# Patient Record
Sex: FEMALE | Race: WHITE | NOT HISPANIC OR LATINO | Employment: OTHER | URBAN - METROPOLITAN AREA
[De-identification: names, ages, dates, MRNs, and addresses within clinical notes are randomized per-mention and may not be internally consistent; named-entity substitution may affect disease eponyms.]

---

## 2017-01-24 DIAGNOSIS — F41.9 ANXIETY: ICD-10-CM

## 2017-01-24 RX ORDER — CLONAZEPAM 0.5 MG/1
TABLET ORAL
Qty: 60 TABLET | Refills: 2 | OUTPATIENT
Start: 2017-01-24 | End: 2017-04-27 | Stop reason: SDUPTHER

## 2017-02-27 ENCOUNTER — OFFICE VISIT (OUTPATIENT)
Dept: INTERNAL MEDICINE | Facility: CLINIC | Age: 72
End: 2017-02-27

## 2017-02-27 ENCOUNTER — HOSPITAL ENCOUNTER (OUTPATIENT)
Dept: GENERAL RADIOLOGY | Facility: HOSPITAL | Age: 72
Discharge: HOME | End: 2017-02-27
Attending: FAMILY MEDICINE | Admitting: FAMILY MEDICINE

## 2017-02-27 VITALS
DIASTOLIC BLOOD PRESSURE: 70 MMHG | HEIGHT: 72 IN | WEIGHT: 183.2 LBS | TEMPERATURE: 98.3 F | BODY MASS INDEX: 24.81 KG/M2 | HEART RATE: 75 BPM | OXYGEN SATURATION: 98 % | SYSTOLIC BLOOD PRESSURE: 110 MMHG

## 2017-02-27 DIAGNOSIS — F41.8 DEPRESSION WITH ANXIETY: Primary | ICD-10-CM

## 2017-02-27 DIAGNOSIS — R05.9 COUGH: ICD-10-CM

## 2017-02-27 DIAGNOSIS — E78.5 HYPERLIPIDEMIA, UNSPECIFIED HYPERLIPIDEMIA TYPE: ICD-10-CM

## 2017-02-27 DIAGNOSIS — M54.5 CHRONIC MIDLINE LOW BACK PAIN, WITH SCIATICA PRESENCE UNSPECIFIED: ICD-10-CM

## 2017-02-27 DIAGNOSIS — R06.02 SHORTNESS OF BREATH: ICD-10-CM

## 2017-02-27 DIAGNOSIS — N18.30 CKD (CHRONIC KIDNEY DISEASE) STAGE 3, GFR 30-59 ML/MIN (HCC): ICD-10-CM

## 2017-02-27 DIAGNOSIS — G89.29 CHRONIC MIDLINE LOW BACK PAIN, WITH SCIATICA PRESENCE UNSPECIFIED: ICD-10-CM

## 2017-02-27 PROBLEM — K21.9 GERD (GASTROESOPHAGEAL REFLUX DISEASE): Status: ACTIVE | Noted: 2017-02-27

## 2017-02-27 PROCEDURE — 71020 HC CHEST PA AND LATERAL: CPT

## 2017-02-27 PROCEDURE — 99214 OFFICE O/P EST MOD 30 MIN: CPT | Performed by: FAMILY MEDICINE

## 2017-02-27 RX ORDER — OMEPRAZOLE 20 MG/1
20 CAPSULE, DELAYED RELEASE ORAL DAILY
COMMUNITY
End: 2017-05-22

## 2017-02-27 RX ORDER — MORPHINE SULFATE 60 MG/1
100 TABLET, FILM COATED, EXTENDED RELEASE ORAL 2 TIMES DAILY
COMMUNITY
Start: 2017-02-17 | End: 2020-03-03

## 2017-02-27 RX ORDER — INFLUENZA A VIRUS A/MICHIGAN/45/2015 X-275 (H1N1) ANTIGEN (FORMALDEHYDE INACTIVATED), INFLUENZA A VIRUS A/SINGAPORE/INFIMH-16-0019/2016 IVR-186 (H3N2) ANTIGEN (FORMALDEHYDE INACTIVATED), AND INFLUENZA B VIRUS B/MARYLAND/15/2016 BX-69A (A B/COLORADO/6/2017-LIKE VIRUS) ANTIGEN (FORMALDEHYDE INACTIVATED) 60; 60; 60 UG/.5ML; UG/.5ML; UG/.5ML
INJECTION, SUSPENSION INTRAMUSCULAR
COMMUNITY
Start: 2016-11-21 | End: 2017-05-22

## 2017-02-27 RX ORDER — MORPHINE SULFATE 15 MG/1
15 TABLET ORAL 2 TIMES DAILY
COMMUNITY
Start: 2017-01-19 | End: 2020-03-03

## 2017-02-27 RX ORDER — BUPROPION HYDROCHLORIDE 150 MG/1
150 TABLET ORAL DAILY
Qty: 30 TABLET | Refills: 5 | Status: SHIPPED | OUTPATIENT
Start: 2017-02-27 | End: 2017-08-15 | Stop reason: SDUPTHER

## 2017-02-27 NOTE — PROGRESS NOTES
Subjective     Sandra Mane is a 72 y.o. female, who presents with a chief complaint of   Chief Complaint   Patient presents with   • Follow-up     3 month.   • Anxiety     Crying a lot more.   • Shortness of Breath   • Back Pain       HPI     1. Depression with anxiety.  Pt reports her symptoms have been worsening. Feels sad and cries.  Denies SI.    2. Chronic back pain.  Pt reports symptoms are stable. Sees Dr. Rea.    3. Shortness of breath.  X 1 month, with exertion.  No smoking history but her parents were heavy smokers.  Denies wheezing but has been coughing for several weeks; it started with an upper respiratory infection.    The following portions of the patient's history were reviewed and updated as appropriate: allergies, current medications, past family history, past medical history, past social history, past surgical history and problem list.    Allergies: Review of patient's allergies indicates no known allergies.    Review of Systems   Constitutional: Negative.    HENT: Negative.    Eyes: Negative.    Respiratory: Negative.    Cardiovascular: Negative.    Gastrointestinal: Negative.    Endocrine: Negative.    Genitourinary: Negative.    Musculoskeletal: Positive for back pain.   Skin: Negative.    Allergic/Immunologic: Negative.    Hematological: Negative.    Psychiatric/Behavioral: Positive for dysphoric mood.       Objective     Wt Readings from Last 3 Encounters:   02/27/17 183 lb 3.2 oz (83.1 kg)   11/21/16 189 lb 9.6 oz (86 kg)   08/18/16 190 lb (86.2 kg)     Temp Readings from Last 3 Encounters:   02/27/17 98.3 °F (36.8 °C)   02/11/16 98.4 °F (36.9 °C)     BP Readings from Last 3 Encounters:   02/27/17 110/70   11/21/16 90/62   08/18/16 138/80     Pulse Readings from Last 3 Encounters:   02/27/17 75   11/21/16 84   08/18/16 88     Body mass index is 22.3 kg/(m^2).  SpO2 Readings from Last 3 Encounters:   02/27/17 98%   11/21/16 98%   08/18/16 98%       Physical Exam   Constitutional: She is  oriented to person, place, and time. She appears well-developed and well-nourished.   HENT:   Head: Normocephalic and atraumatic.   Right Ear: External ear normal.   Left Ear: External ear normal.   Nose: Nose normal.   Mouth/Throat: Oropharynx is clear and moist.   Eyes: Conjunctivae and EOM are normal. Pupils are equal, round, and reactive to light. No scleral icterus.   Neck: Normal range of motion. Neck supple. No thyromegaly present.   Cardiovascular: Normal rate, regular rhythm, normal heart sounds and intact distal pulses.  Exam reveals no gallop and no friction rub.    No murmur heard.  Pulmonary/Chest: Effort normal and breath sounds normal. No respiratory distress. She has no wheezes. She has no rales.   Abdominal: Soft. Bowel sounds are normal. There is no hepatosplenomegaly.   Musculoskeletal: She exhibits no edema.   Back not examined.   Lymphadenopathy:     She has no cervical adenopathy.   Neurological: She is alert and oriented to person, place, and time. She has normal reflexes. She displays normal reflexes. No cranial nerve deficit. She exhibits normal muscle tone. Coordination normal.   Skin: Skin is warm and dry. No rash noted.   Psychiatric: She has a normal mood and affect. Her behavior is normal. Judgment and thought content normal.         Assessment/Plan   Sandra was seen today for follow-up, anxiety, shortness of breath and back pain.    Diagnoses and all orders for this visit:    Depression with anxiety  -     CBC & Differential; Future  -     TSH; Future  -     buPROPion XL (WELLBUTRIN XL) 150 MG 24 hr tablet; Take 1 tablet by mouth Daily.    Hyperlipidemia, unspecified hyperlipidemia type  -     Comprehensive Metabolic Panel; Future  -     Lipid Panel With / Chol / HDL Ratio; Future    Chronic midline low back pain, with sciatica presence unspecified    CKD (chronic kidney disease) stage 3, GFR 30-59 ml/min    Cough  -     XR Chest PA & Lateral; Future    Shortness of breath  -     XR  Chest PA & Lateral; Future  -     Pulmonary Function Test; Future      1. Depression with anxiety.  Not controlled. Add bupropion  mg daily.    2. Hyperlipidemia.  Lifestyle measures.  Labs next time.    3. Chronic back pain.  Per Dr. Rea.    4. CKDIII.  Avoid NSAIDs.  Monitor.    5. Cough and shortness of breath.  Check CXR and PFTs.      Outpatient Medications Prior to Visit   Medication Sig Dispense Refill   • baclofen (LIORESAL) 10 MG tablet Take 10 mg by mouth 2 (two) times a day. TAKE 1/2 - 1 PO BID     • citalopram (CeleXA) 40 MG tablet Take 1 tablet by mouth Daily. 30 tablet 6   • clonazePAM (KlonoPIN) 0.5 MG tablet TAKE TWO TABLETS BY MOUTH EVERY NIGHT AT BEDTIME AS NEEDED 60 tablet 2   • gabapentin (NEURONTIN) 400 MG capsule Take 400 mg by mouth every 4 (four) hours.     • MORPHINE SULFATE ER PO Take 60 mg by mouth 2 (two) times a day.       No facility-administered medications prior to visit.      New Medications Ordered This Visit   Medications   • buPROPion XL (WELLBUTRIN XL) 150 MG 24 hr tablet     Sig: Take 1 tablet by mouth Daily.     Dispense:  30 tablet     Refill:  5     [unfilled]  Medications Discontinued During This Encounter   Medication Reason   • MORPHINE SULFATE ER PO Duplicate order         Return in about 3 months (around 5/27/2017).

## 2017-02-28 ENCOUNTER — TELEPHONE (OUTPATIENT)
Dept: INTERNAL MEDICINE | Facility: CLINIC | Age: 72
End: 2017-02-28

## 2017-02-28 NOTE — TELEPHONE ENCOUNTER
Patient has been advised and voiced understanding.    ----- Message from Sriram Blanton MD sent at 2/27/2017  5:12 PM EST -----  Please call the patient regarding her result.  CXR is normal. Thanks.

## 2017-03-03 ENCOUNTER — HOSPITAL ENCOUNTER (OUTPATIENT)
Dept: PULMONOLOGY | Facility: HOSPITAL | Age: 72
Discharge: HOME | End: 2017-03-03
Attending: FAMILY MEDICINE | Admitting: FAMILY MEDICINE

## 2017-03-03 DIAGNOSIS — R06.02 SHORTNESS OF BREATH: ICD-10-CM

## 2017-03-03 PROCEDURE — 94010 BREATHING CAPACITY TEST: CPT

## 2017-03-04 ENCOUNTER — RESULTS ENCOUNTER (OUTPATIENT)
Dept: INTERNAL MEDICINE | Facility: CLINIC | Age: 72
End: 2017-03-04

## 2017-03-04 DIAGNOSIS — E78.5 HYPERLIPIDEMIA, UNSPECIFIED HYPERLIPIDEMIA TYPE: ICD-10-CM

## 2017-03-04 DIAGNOSIS — F41.8 DEPRESSION WITH ANXIETY: ICD-10-CM

## 2017-03-23 ENCOUNTER — OFFICE VISIT (OUTPATIENT)
Dept: INTERNAL MEDICINE | Facility: CLINIC | Age: 72
End: 2017-03-23

## 2017-03-23 ENCOUNTER — TRANSCRIBE ORDERS (OUTPATIENT)
Dept: ADMINISTRATIVE | Facility: HOSPITAL | Age: 72
End: 2017-03-23

## 2017-03-23 VITALS
BODY MASS INDEX: 23.69 KG/M2 | SYSTOLIC BLOOD PRESSURE: 144 MMHG | OXYGEN SATURATION: 97 % | WEIGHT: 194.6 LBS | DIASTOLIC BLOOD PRESSURE: 82 MMHG | TEMPERATURE: 98.7 F | HEART RATE: 78 BPM

## 2017-03-23 DIAGNOSIS — R41.3 MEMORY LOSS: ICD-10-CM

## 2017-03-23 DIAGNOSIS — H53.2 DIPLOPIA: Primary | ICD-10-CM

## 2017-03-23 DIAGNOSIS — R41.3 MEMORY CHANGES: ICD-10-CM

## 2017-03-23 DIAGNOSIS — M48.00 SPINAL STENOSIS, UNSPECIFIED SPINAL REGION: Primary | ICD-10-CM

## 2017-03-23 DIAGNOSIS — R06.83 SNORING: ICD-10-CM

## 2017-03-23 PROCEDURE — 99214 OFFICE O/P EST MOD 30 MIN: CPT | Performed by: FAMILY MEDICINE

## 2017-03-23 NOTE — PROGRESS NOTES
Subjective     Sandra Mane is a 72 y.o. female, who presents with a chief complaint of   Chief Complaint   Patient presents with   • Diplopia   • Memory Loss       HPI     1. Double vision.  Pt c/o one month of double vision; it is constant and fixed.      2. Memory problems.  She also reports that her memory is getting worse; her  says she puts things in the wrong drawers; repeats things; difficulty concentrating and finishing tasks; can't remember phone numbers.  Denies new numbness, weakness in the extremities.  She says that Dr. Anaya doesn't think any of this is due to her pain medication.  She has been crying a lot.  Denies SI.    3. Snoring. Her  says she snores loudly.  Has some sleepiness in the afternoons.    The following portions of the patient's history were reviewed and updated as appropriate: allergies, current medications, past family history, past medical history, past social history, past surgical history and problem list.    Allergies: Review of patient's allergies indicates no known allergies.    Review of Systems   Constitutional: Negative.    HENT: Negative.    Eyes: Positive for visual disturbance.   Respiratory: Negative.    Cardiovascular: Negative.        Objective     Wt Readings from Last 3 Encounters:   03/23/17 194 lb 9.6 oz (88.3 kg)   02/27/17 183 lb 3.2 oz (83.1 kg)   11/21/16 189 lb 9.6 oz (86 kg)     Temp Readings from Last 3 Encounters:   03/23/17 98.7 °F (37.1 °C)   02/27/17 98.3 °F (36.8 °C)   02/11/16 98.4 °F (36.9 °C)     BP Readings from Last 3 Encounters:   03/23/17 144/82   02/27/17 110/70   11/21/16 90/62     Pulse Readings from Last 3 Encounters:   03/23/17 78   02/27/17 75   11/21/16 84     Body mass index is 23.69 kg/(m^2).  SpO2 Readings from Last 3 Encounters:   03/23/17 97%   02/27/17 98%   11/21/16 98%       Physical Exam   Constitutional: She is oriented to person, place, and time. She appears well-developed and well-nourished.   HENT:   Head:  Normocephalic.   Mouth/Throat: Oropharynx is clear and moist.   Eyes: Conjunctivae and EOM are normal. Pupils are equal, round, and reactive to light.   Neck: Normal range of motion. Neck supple. No thyromegaly present.   Cardiovascular: Normal rate, regular rhythm and normal heart sounds.    Pulmonary/Chest: Effort normal and breath sounds normal.   Abdominal: Soft. Bowel sounds are normal. There is no hepatosplenomegaly.   Musculoskeletal: Normal range of motion. She exhibits no edema.   Lymphadenopathy:     She has no cervical adenopathy.   Neurological: She is alert and oriented to person, place, and time.   Skin: Skin is warm and dry. No rash noted.   Psychiatric: She has a normal mood and affect. Her behavior is normal.   Vitals reviewed.      Results for orders placed or performed in visit on 05/21/16   Comprehensive metabolic panel   Result Value Ref Range    Glucose 94 65 - 99 mg/dL    BUN 15 8 - 23 mg/dL    Creatinine 1.21 (H) 0.57 - 1.00 mg/dL    eGFR Non African Am 44 (L) >60 mL/min/1.73    eGFR African Am 53 (L) >60 mL/min/1.73    BUN/Creatinine Ratio 12.4 7.0 - 25.0    Sodium 139 136 - 145 mmol/L    Potassium 4.9 3.5 - 5.2 mmol/L    Chloride 100 98 - 107 mmol/L    Total CO2 26.3 22.0 - 29.0 mmol/L    Calcium 9.2 8.8 - 10.5 mg/dL    Total Protein 6.9 6.0 - 8.5 g/dL    Albumin 4.00 3.50 - 5.20 g/dL    Globulin 2.9 gm/dL    A/G Ratio 1.4 g/dL    Total Bilirubin 0.3 0.2 - 1.2 mg/dL    Alkaline Phosphatase 83 40 - 129 U/L    AST (SGOT) 19 5 - 32 U/L    ALT (SGPT) 12 5 - 33 U/L   Lipid Panel w/ Chol/HDL Ratio   Result Value Ref Range    Total Cholesterol 188 0 - 200 mg/dL    Triglycerides 184 (H) 0 - 150 mg/dL    HDL Cholesterol 36 (L) 40 - 60 mg/dL    VLDL Cholesterol 36.8 (H) 7 - 27 mg/dL    LDL Cholesterol  115 (H) 0 - 100 mg/dL    Chol/HDL Ratio 5.22        Assessment/Plan   Sandra was seen today for diplopia and memory loss.    Diagnoses and all orders for this visit:    Diplopia  -     CT Head Without  Contrast; Future    Memory loss  -     CT Head Without Contrast; Future    Memory changes  -     NeuroPsych Testing; Future    Snoring  -     Home Sleep Study; Future    1. Diplopia.  Check CT scan (cannot get MRI).  She will get an eye exam as well.  Move her labs up sooner.    2. Memory difficulty.  Neuropsych testing.  There may be a large depression component as well.      3. Snoring disorder.  Untreated sleep apnea may be contributing to her memory changes. Check sleep study.      Outpatient Medications Prior to Visit   Medication Sig Dispense Refill   • buPROPion XL (WELLBUTRIN XL) 150 MG 24 hr tablet Take 1 tablet by mouth Daily. 30 tablet 5   • citalopram (CeleXA) 40 MG tablet Take 1 tablet by mouth Daily. 30 tablet 6   • clonazePAM (KlonoPIN) 0.5 MG tablet TAKE TWO TABLETS BY MOUTH EVERY NIGHT AT BEDTIME AS NEEDED 60 tablet 2   • FLUZONE HIGH-DOSE 0.5 ML suspension prefilled syringe injection      • gabapentin (NEURONTIN) 400 MG capsule Take 400 mg by mouth every 4 (four) hours.     • Morphine (MS CONTIN) 60 MG 12 hr tablet Take 60 mg by mouth 2 (Two) Times a Day.     • Morphine (MSIR) 15 MG tablet Take 15 mg by mouth 2 (Two) Times a Day.     • omeprazole (priLOSEC) 20 MG capsule Take 20 mg by mouth Daily.     • baclofen (LIORESAL) 10 MG tablet Take 10 mg by mouth 2 (two) times a day. TAKE 1/2 - 1 PO BID       No facility-administered medications prior to visit.      No orders of the defined types were placed in this encounter.    [unfilled]  Medications Discontinued During This Encounter   Medication Reason   • baclofen (LIORESAL) 10 MG tablet Therapy completed         Return if symptoms worsen or fail to improve, for Next scheduled follow up.

## 2017-03-28 ENCOUNTER — HOSPITAL ENCOUNTER (OUTPATIENT)
Dept: CT IMAGING | Facility: HOSPITAL | Age: 72
Discharge: HOME | End: 2017-03-28
Admitting: PAIN MEDICINE

## 2017-03-28 DIAGNOSIS — H53.2 DIPLOPIA: ICD-10-CM

## 2017-03-28 DIAGNOSIS — R41.3 MEMORY LOSS: ICD-10-CM

## 2017-03-28 DIAGNOSIS — M48.00 SPINAL STENOSIS, UNSPECIFIED SPINAL REGION: ICD-10-CM

## 2017-03-28 PROCEDURE — 72131 CT LUMBAR SPINE W/O DYE: CPT

## 2017-03-28 PROCEDURE — 70450 CT HEAD/BRAIN W/O DYE: CPT

## 2017-03-30 ENCOUNTER — TELEPHONE (OUTPATIENT)
Dept: INTERNAL MEDICINE | Facility: CLINIC | Age: 72
End: 2017-03-30

## 2017-03-30 NOTE — TELEPHONE ENCOUNTER
----- Message from Sriram Blanton MD sent at 3/29/2017  1:44 PM EDT -----  Please call the patient regarding her result.  The CT scan of her head is negative.  Thanks.    Results given. dg

## 2017-04-05 ENCOUNTER — APPOINTMENT (OUTPATIENT)
Dept: CT IMAGING | Facility: HOSPITAL | Age: 72
End: 2017-04-05
Attending: FAMILY MEDICINE

## 2017-04-27 DIAGNOSIS — F41.9 ANXIETY: ICD-10-CM

## 2017-04-27 RX ORDER — CLONAZEPAM 0.5 MG/1
TABLET ORAL
Qty: 60 TABLET | Refills: 2 | OUTPATIENT
Start: 2017-04-27 | End: 2017-07-26 | Stop reason: SDUPTHER

## 2017-05-16 LAB
ALBUMIN SERPL-MCNC: 3.9 G/DL (ref 3.5–5.2)
ALBUMIN/GLOB SERPL: 1.4 G/DL
ALP SERPL-CCNC: 77 U/L (ref 40–129)
ALT SERPL-CCNC: 12 U/L (ref 5–33)
AST SERPL-CCNC: 19 U/L (ref 5–32)
BASOPHILS # BLD AUTO: 0.03 10*3/MM3 (ref 0–0.2)
BASOPHILS NFR BLD AUTO: 0.7 % (ref 0–2)
BILIRUB SERPL-MCNC: 0.3 MG/DL (ref 0.2–1.2)
BUN SERPL-MCNC: 20 MG/DL (ref 8–23)
BUN/CREAT SERPL: 15.9 (ref 7–25)
CALCIUM SERPL-MCNC: 9 MG/DL (ref 8.8–10.5)
CHLORIDE SERPL-SCNC: 100 MMOL/L (ref 98–107)
CHOLEST SERPL-MCNC: 196 MG/DL (ref 0–200)
CHOLEST/HDLC SERPL: 4.36 {RATIO}
CO2 SERPL-SCNC: 27.8 MMOL/L (ref 22–29)
CREAT SERPL-MCNC: 1.26 MG/DL (ref 0.57–1)
EOSINOPHIL # BLD AUTO: 0.16 10*3/MM3 (ref 0.1–0.3)
EOSINOPHIL NFR BLD AUTO: 3.5 % (ref 0–4)
ERYTHROCYTE [DISTWIDTH] IN BLOOD BY AUTOMATED COUNT: 13.3 % (ref 11.5–14.5)
GLOBULIN SER CALC-MCNC: 2.7 GM/DL
GLUCOSE SERPL-MCNC: 87 MG/DL (ref 65–99)
HCT VFR BLD AUTO: 34.2 % (ref 37–47)
HDLC SERPL-MCNC: 45 MG/DL (ref 40–60)
HGB BLD-MCNC: 11 G/DL (ref 12–16)
IMM GRANULOCYTES # BLD: 0.01 10*3/MM3 (ref 0–0.03)
IMM GRANULOCYTES NFR BLD: 0.2 % (ref 0–0.5)
LDLC SERPL CALC-MCNC: 119 MG/DL (ref 0–100)
LYMPHOCYTES # BLD AUTO: 1.43 10*3/MM3 (ref 0.6–4.8)
LYMPHOCYTES NFR BLD AUTO: 31.5 % (ref 20–45)
MCH RBC QN AUTO: 30.2 PG (ref 27–31)
MCHC RBC AUTO-ENTMCNC: 32.2 G/DL (ref 31–37)
MCV RBC AUTO: 94 FL (ref 81–99)
MONOCYTES # BLD AUTO: 0.45 10*3/MM3 (ref 0–1)
MONOCYTES NFR BLD AUTO: 9.9 % (ref 3–8)
NEUTROPHILS # BLD AUTO: 2.46 10*3/MM3 (ref 1.5–8.3)
NEUTROPHILS NFR BLD AUTO: 54.2 % (ref 45–70)
NRBC BLD AUTO-RTO: 0 /100 WBC (ref 0–0)
PLATELET # BLD AUTO: 236 10*3/MM3 (ref 140–500)
POTASSIUM SERPL-SCNC: 5 MMOL/L (ref 3.5–5.2)
PROT SERPL-MCNC: 6.6 G/DL (ref 6–8.5)
RBC # BLD AUTO: 3.64 10*6/MM3 (ref 4.2–5.4)
SODIUM SERPL-SCNC: 137 MMOL/L (ref 136–145)
TRIGL SERPL-MCNC: 159 MG/DL (ref 0–150)
TSH SERPL DL<=0.005 MIU/L-ACNC: 3.51 MIU/ML (ref 0.27–4.2)
VLDLC SERPL CALC-MCNC: 31.8 MG/DL (ref 7–27)
WBC # BLD AUTO: 4.54 10*3/MM3 (ref 4.8–10.8)

## 2017-05-22 ENCOUNTER — OFFICE VISIT (OUTPATIENT)
Dept: INTERNAL MEDICINE | Facility: CLINIC | Age: 72
End: 2017-05-22

## 2017-05-22 VITALS
HEART RATE: 68 BPM | BODY MASS INDEX: 23.61 KG/M2 | SYSTOLIC BLOOD PRESSURE: 120 MMHG | WEIGHT: 194 LBS | TEMPERATURE: 97.9 F | OXYGEN SATURATION: 97 % | DIASTOLIC BLOOD PRESSURE: 70 MMHG

## 2017-05-22 DIAGNOSIS — G89.29 CHRONIC MIDLINE LOW BACK PAIN, WITH SCIATICA PRESENCE UNSPECIFIED: ICD-10-CM

## 2017-05-22 DIAGNOSIS — E78.5 HYPERLIPIDEMIA, UNSPECIFIED HYPERLIPIDEMIA TYPE: Primary | ICD-10-CM

## 2017-05-22 DIAGNOSIS — D64.9 ANEMIA, UNSPECIFIED TYPE: ICD-10-CM

## 2017-05-22 DIAGNOSIS — Z12.31 ENCOUNTER FOR SCREENING MAMMOGRAM FOR MALIGNANT NEOPLASM OF BREAST: ICD-10-CM

## 2017-05-22 DIAGNOSIS — R41.3 MEMORY CHANGES: ICD-10-CM

## 2017-05-22 DIAGNOSIS — R06.83 SNORING: ICD-10-CM

## 2017-05-22 DIAGNOSIS — F41.8 DEPRESSION WITH ANXIETY: ICD-10-CM

## 2017-05-22 DIAGNOSIS — M54.5 CHRONIC MIDLINE LOW BACK PAIN, WITH SCIATICA PRESENCE UNSPECIFIED: ICD-10-CM

## 2017-05-22 DIAGNOSIS — N18.30 CKD (CHRONIC KIDNEY DISEASE) STAGE 3, GFR 30-59 ML/MIN (HCC): ICD-10-CM

## 2017-05-22 PROCEDURE — 99214 OFFICE O/P EST MOD 30 MIN: CPT | Performed by: FAMILY MEDICINE

## 2017-05-23 DIAGNOSIS — F41.9 ANXIETY: ICD-10-CM

## 2017-05-23 RX ORDER — CITALOPRAM 40 MG/1
TABLET ORAL
Qty: 30 TABLET | Refills: 5 | Status: SHIPPED | OUTPATIENT
Start: 2017-05-23 | End: 2017-08-24

## 2017-05-27 ENCOUNTER — RESULTS ENCOUNTER (OUTPATIENT)
Dept: INTERNAL MEDICINE | Facility: CLINIC | Age: 72
End: 2017-05-27

## 2017-05-27 DIAGNOSIS — D64.9 ANEMIA, UNSPECIFIED TYPE: ICD-10-CM

## 2017-05-27 DIAGNOSIS — N18.30 CKD (CHRONIC KIDNEY DISEASE) STAGE 3, GFR 30-59 ML/MIN (HCC): ICD-10-CM

## 2017-05-31 ENCOUNTER — HOSPITAL ENCOUNTER (OUTPATIENT)
Dept: MAMMOGRAPHY | Facility: HOSPITAL | Age: 72
Discharge: HOME | End: 2017-05-31
Attending: FAMILY MEDICINE | Admitting: FAMILY MEDICINE

## 2017-05-31 DIAGNOSIS — Z12.31 ENCOUNTER FOR SCREENING MAMMOGRAM FOR MALIGNANT NEOPLASM OF BREAST: ICD-10-CM

## 2017-05-31 PROCEDURE — 77063 BREAST TOMOSYNTHESIS BI: CPT

## 2017-05-31 PROCEDURE — G0202 SCR MAMMO BI INCL CAD: HCPCS

## 2017-06-10 DIAGNOSIS — R41.3 MEMORY LOSS: Primary | ICD-10-CM

## 2017-07-26 DIAGNOSIS — F41.9 ANXIETY: ICD-10-CM

## 2017-07-26 RX ORDER — CLONAZEPAM 0.5 MG/1
1 TABLET ORAL NIGHTLY PRN
Qty: 60 TABLET | Refills: 2 | OUTPATIENT
Start: 2017-07-26 | End: 2018-02-24 | Stop reason: SDUPTHER

## 2017-08-15 DIAGNOSIS — F41.8 DEPRESSION WITH ANXIETY: ICD-10-CM

## 2017-08-16 RX ORDER — BUPROPION HYDROCHLORIDE 150 MG/1
TABLET ORAL
Qty: 30 TABLET | Refills: 4 | Status: SHIPPED | OUTPATIENT
Start: 2017-08-16 | End: 2017-12-08 | Stop reason: SDUPTHER

## 2017-08-18 LAB
ALBUMIN SERPL-MCNC: 4.2 G/DL (ref 3.5–5.2)
ALBUMIN/GLOB SERPL: 1.4 G/DL
ALP SERPL-CCNC: 84 U/L (ref 40–129)
ALT SERPL-CCNC: 12 U/L (ref 5–33)
AST SERPL-CCNC: 22 U/L (ref 5–32)
BASOPHILS # BLD AUTO: 0.03 10*3/MM3 (ref 0–0.2)
BASOPHILS NFR BLD AUTO: 0.6 % (ref 0–2)
BILIRUB SERPL-MCNC: 0.3 MG/DL (ref 0.2–1.2)
BUN SERPL-MCNC: 20 MG/DL (ref 8–23)
BUN/CREAT SERPL: 14.6 (ref 7–25)
CALCIUM SERPL-MCNC: 9.2 MG/DL (ref 8.8–10.5)
CHLORIDE SERPL-SCNC: 101 MMOL/L (ref 98–107)
CO2 SERPL-SCNC: 24 MMOL/L (ref 22–29)
CREAT SERPL-MCNC: 1.37 MG/DL (ref 0.57–1)
EOSINOPHIL # BLD AUTO: 0.16 10*3/MM3 (ref 0.1–0.3)
EOSINOPHIL NFR BLD AUTO: 3.2 % (ref 0–4)
ERYTHROCYTE [DISTWIDTH] IN BLOOD BY AUTOMATED COUNT: 13.1 % (ref 11.5–14.5)
FERRITIN SERPL-MCNC: 68.08 NG/ML (ref 13–150)
FOLATE BLD-MCNC: 441.7 NG/ML
FOLATE RBC-MCNC: 1258 NG/ML
GLOBULIN SER CALC-MCNC: 3.1 GM/DL
GLUCOSE SERPL-MCNC: 96 MG/DL (ref 65–99)
HCT VFR BLD AUTO: 35.1 % (ref 37–47)
HGB BLD-MCNC: 11.1 G/DL (ref 12–16)
IMM GRANULOCYTES # BLD: 0.01 10*3/MM3 (ref 0–0.03)
IMM GRANULOCYTES NFR BLD: 0.2 % (ref 0–0.5)
LYMPHOCYTES # BLD AUTO: 1.45 10*3/MM3 (ref 0.6–4.8)
LYMPHOCYTES NFR BLD AUTO: 28.7 % (ref 20–45)
MCH RBC QN AUTO: 29.9 PG (ref 27–31)
MCHC RBC AUTO-ENTMCNC: 31.6 G/DL (ref 31–37)
MCV RBC AUTO: 94.6 FL (ref 81–99)
MONOCYTES # BLD AUTO: 0.5 10*3/MM3 (ref 0–1)
MONOCYTES NFR BLD AUTO: 9.9 % (ref 3–8)
NEUTROPHILS # BLD AUTO: 2.9 10*3/MM3 (ref 1.5–8.3)
NEUTROPHILS NFR BLD AUTO: 57.4 % (ref 45–70)
NRBC BLD AUTO-RTO: 0 /100 WBC (ref 0–0)
PLATELET # BLD AUTO: 229 10*3/MM3 (ref 140–500)
POTASSIUM SERPL-SCNC: 5.1 MMOL/L (ref 3.5–5.2)
PROT SERPL-MCNC: 7.3 G/DL (ref 6–8.5)
RBC # BLD AUTO: 3.71 10*6/MM3 (ref 4.2–5.4)
SODIUM SERPL-SCNC: 140 MMOL/L (ref 136–145)
VIT B12 SERPL-MCNC: 474 PG/ML (ref 211–946)
WBC # BLD AUTO: 5.05 10*3/MM3 (ref 4.8–10.8)

## 2017-08-24 ENCOUNTER — OFFICE VISIT (OUTPATIENT)
Dept: INTERNAL MEDICINE | Facility: CLINIC | Age: 72
End: 2017-08-24

## 2017-08-24 VITALS
TEMPERATURE: 98.5 F | DIASTOLIC BLOOD PRESSURE: 70 MMHG | OXYGEN SATURATION: 96 % | BODY MASS INDEX: 22.79 KG/M2 | SYSTOLIC BLOOD PRESSURE: 130 MMHG | HEART RATE: 73 BPM | WEIGHT: 187.2 LBS

## 2017-08-24 DIAGNOSIS — N18.30 CKD (CHRONIC KIDNEY DISEASE) STAGE 3, GFR 30-59 ML/MIN (HCC): ICD-10-CM

## 2017-08-24 DIAGNOSIS — R41.3 MEMORY CHANGES: ICD-10-CM

## 2017-08-24 DIAGNOSIS — G89.29 CHRONIC MIDLINE LOW BACK PAIN, WITH SCIATICA PRESENCE UNSPECIFIED: ICD-10-CM

## 2017-08-24 DIAGNOSIS — E78.5 HYPERLIPIDEMIA, UNSPECIFIED HYPERLIPIDEMIA TYPE: Primary | ICD-10-CM

## 2017-08-24 DIAGNOSIS — R06.83 SNORING: ICD-10-CM

## 2017-08-24 DIAGNOSIS — M54.5 CHRONIC MIDLINE LOW BACK PAIN, WITH SCIATICA PRESENCE UNSPECIFIED: ICD-10-CM

## 2017-08-24 DIAGNOSIS — D64.9 ANEMIA, UNSPECIFIED TYPE: ICD-10-CM

## 2017-08-24 DIAGNOSIS — F41.8 DEPRESSION WITH ANXIETY: ICD-10-CM

## 2017-08-24 PROCEDURE — 99214 OFFICE O/P EST MOD 30 MIN: CPT | Performed by: FAMILY MEDICINE

## 2017-08-24 NOTE — PROGRESS NOTES
Subjective     Sandra Mane is a 72 y.o. female, who presents with a chief complaint of   Chief Complaint   Patient presents with   • Hyperlipidemia       Hyperlipidemia        1. Snoring.  Pt canceled the sleep study and doesn't want to have this done.    2. Depression with anxiety.  Pt reports she is controlled.  Denies SI.    3. Chronic back pain.  Pt reports symptoms are stable.  She sees Dr. Rea.  She is planning to have a series of epidurals.    The following portions of the patient's history were reviewed and updated as appropriate: allergies, current medications, past family history, past medical history, past social history, past surgical history and problem list.    Allergies: Review of patient's allergies indicates no known allergies.    Review of Systems   Constitutional: Negative.    HENT: Negative.    Eyes: Negative.    Respiratory: Negative.    Cardiovascular: Negative.    Gastrointestinal: Negative.    Endocrine: Negative.    Genitourinary: Negative.    Musculoskeletal: Positive for back pain.   Skin: Negative.    Allergic/Immunologic: Negative.    Neurological: Negative.    Hematological: Negative.    Psychiatric/Behavioral: The patient is nervous/anxious.        Objective     Wt Readings from Last 3 Encounters:   08/24/17 187 lb 3.2 oz (84.9 kg)   05/22/17 194 lb (88 kg)   03/23/17 194 lb 9.6 oz (88.3 kg)     Temp Readings from Last 3 Encounters:   08/24/17 98.5 °F (36.9 °C)   05/22/17 97.9 °F (36.6 °C)   03/23/17 98.7 °F (37.1 °C)     BP Readings from Last 3 Encounters:   08/24/17 130/70   05/22/17 120/70   03/23/17 144/82     Pulse Readings from Last 3 Encounters:   08/24/17 73   05/22/17 68   03/23/17 78     Body mass index is 22.79 kg/(m^2).  SpO2 Readings from Last 3 Encounters:   08/24/17 96%   05/22/17 97%   03/23/17 97%       Physical Exam   Constitutional: She is oriented to person, place, and time. She appears well-developed and well-nourished.   HENT:   Head: Normocephalic and  atraumatic.   Right Ear: External ear normal.   Left Ear: External ear normal.   Nose: Nose normal.   Mouth/Throat: Oropharynx is clear and moist.   Eyes: Conjunctivae and EOM are normal. Pupils are equal, round, and reactive to light. No scleral icterus.   Neck: Normal range of motion. Neck supple. No thyromegaly present.   Cardiovascular: Normal rate, regular rhythm, normal heart sounds and intact distal pulses.  Exam reveals no gallop and no friction rub.    No murmur heard.  Pulmonary/Chest: Effort normal and breath sounds normal. No respiratory distress. She has no wheezes. She has no rales.   Abdominal: Soft. Bowel sounds are normal. There is no hepatosplenomegaly.   Musculoskeletal: She exhibits no edema.   Back not examined.   Lymphadenopathy:     She has no cervical adenopathy.   Neurological: She is alert and oriented to person, place, and time. She has normal reflexes. She displays normal reflexes. No cranial nerve deficit. She exhibits normal muscle tone. Coordination normal.   Skin: Skin is warm and dry. No rash noted.   Psychiatric: She has a normal mood and affect. Her behavior is normal. Judgment and thought content normal.       Results for orders placed or performed in visit on 05/27/17   Ferritin   Result Value Ref Range    Ferritin 68.08 13.00 - 150.00 ng/mL   Vitamin B12   Result Value Ref Range    Vitamin B-12 474 211 - 946 pg/mL   Folate RBC   Result Value Ref Range    Folate, Hemolysate 441.7 Not Estab. ng/mL    RBC Folate 1258 >498 ng/mL   Comprehensive Metabolic Panel   Result Value Ref Range    Glucose 96 65 - 99 mg/dL    BUN 20 8 - 23 mg/dL    Creatinine 1.37 (H) 0.57 - 1.00 mg/dL    eGFR Non African Am 38 (L) >60 mL/min/1.73    eGFR  Am 46 (L) >60 mL/min/1.73    BUN/Creatinine Ratio 14.6 7.0 - 25.0    Sodium 140 136 - 145 mmol/L    Potassium 5.1 3.5 - 5.2 mmol/L    Chloride 101 98 - 107 mmol/L    Total CO2 24.0 22.0 - 29.0 mmol/L    Calcium 9.2 8.8 - 10.5 mg/dL    Total Protein  7.3 6.0 - 8.5 g/dL    Albumin 4.20 3.50 - 5.20 g/dL    Globulin 3.1 gm/dL    A/G Ratio 1.4 g/dL    Total Bilirubin 0.3 0.2 - 1.2 mg/dL    Alkaline Phosphatase 84 40 - 129 U/L    AST (SGOT) 22 5 - 32 U/L    ALT (SGPT) 12 5 - 33 U/L   CBC & Differential   Result Value Ref Range    WBC 5.05 4.80 - 10.80 10*3/mm3    RBC 3.71 (L) 4.20 - 5.40 10*6/mm3    Hemoglobin 11.1 (L) 12.0 - 16.0 g/dL    Hematocrit 35.1 (L) 37.0 - 47.0 %    MCV 94.6 81.0 - 99.0 fL    MCH 29.9 27.0 - 31.0 pg    MCHC 31.6 31.0 - 37.0 g/dL    RDW 13.1 11.5 - 14.5 %    Platelets 229 140 - 500 10*3/mm3    Neutrophil Rel % 57.4 45.0 - 70.0 %    Lymphocyte Rel % 28.7 20.0 - 45.0 %    Monocyte Rel % 9.9 (H) 3.0 - 8.0 %    Eosinophil Rel % 3.2 0.0 - 4.0 %    Basophil Rel % 0.6 0.0 - 2.0 %    Neutrophils Absolute 2.90 1.50 - 8.30 10*3/mm3    Lymphocytes Absolute 1.45 0.60 - 4.80 10*3/mm3    Monocytes Absolute 0.50 0.00 - 1.00 10*3/mm3    Eosinophils Absolute 0.16 0.10 - 0.30 10*3/mm3    Basophils Absolute 0.03 0.00 - 0.20 10*3/mm3    Immature Granulocyte Rel % 0.2 0.0 - 0.5 %    Immature Grans Absolute 0.01 0.00 - 0.03 10*3/mm3    nRBC 0.0 0.0 - 0.0 /100 WBC       Assessment/Plan   Sandra was seen today for hyperlipidemia.    Diagnoses and all orders for this visit:    Hyperlipidemia, unspecified hyperlipidemia type    CKD (chronic kidney disease) stage 3, GFR 30-59 ml/min    Depression with anxiety    Chronic midline low back pain, with sciatica presence unspecified    Snoring    Anemia, unspecified type    Memory changes    1. Hyperlipidemia.  Mild. Lifestyle measures.    2. CKDIII.  Stable.  Avoid NSAIDs.    3. Depression with anxiety. Controlled with bupropion.     4. Chronic back pain.  Stable. Per pain management.    5. Snoring. Declines sleep study.    6. Anemia.  Mild, normocytic.  Check studies negative.  Monitor.    7. Memory change.  Has appointment with neuro pending.  Will obtain neuropsych testing.      Outpatient Medications Prior to Visit    Medication Sig Dispense Refill   • buPROPion XL (WELLBUTRIN XL) 150 MG 24 hr tablet TAKE ONE TABLET BY MOUTH DAILY 30 tablet 4   • clonazePAM (KlonoPIN) 0.5 MG tablet Take 2 tablets by mouth At Night As Needed for Seizures. 60 tablet 2   • gabapentin (NEURONTIN) 400 MG capsule Take 400 mg by mouth every 4 (four) hours.     • Morphine (MS CONTIN) 60 MG 12 hr tablet Take 60 mg by mouth 2 (Two) Times a Day.     • Morphine (MSIR) 15 MG tablet Take 15 mg by mouth 2 (Two) Times a Day.     • citalopram (CeleXA) 40 MG tablet TAKE ONE TABLET BY MOUTH DAILY 30 tablet 5     No facility-administered medications prior to visit.      No orders of the defined types were placed in this encounter.    [unfilled]  Medications Discontinued During This Encounter   Medication Reason   • citalopram (CeleXA) 40 MG tablet Therapy completed         Return in about 6 months (around 2/24/2018).

## 2017-10-10 ENCOUNTER — OFFICE VISIT (OUTPATIENT)
Dept: NEUROLOGY | Facility: CLINIC | Age: 72
End: 2017-10-10

## 2017-10-10 ENCOUNTER — TELEPHONE (OUTPATIENT)
Dept: CT IMAGING | Facility: HOSPITAL | Age: 72
End: 2017-10-10

## 2017-10-10 ENCOUNTER — HOSPITAL ENCOUNTER (OUTPATIENT)
Dept: CT IMAGING | Facility: HOSPITAL | Age: 72
Discharge: HOME | End: 2017-10-10
Attending: PSYCHIATRY & NEUROLOGY | Admitting: PSYCHIATRY & NEUROLOGY

## 2017-10-10 VITALS
WEIGHT: 186 LBS | DIASTOLIC BLOOD PRESSURE: 90 MMHG | BODY MASS INDEX: 25.19 KG/M2 | HEART RATE: 74 BPM | HEIGHT: 72 IN | SYSTOLIC BLOOD PRESSURE: 140 MMHG | OXYGEN SATURATION: 95 %

## 2017-10-10 DIAGNOSIS — M54.5 CHRONIC MIDLINE LOW BACK PAIN, WITH SCIATICA PRESENCE UNSPECIFIED: ICD-10-CM

## 2017-10-10 DIAGNOSIS — F02.80 LATE ONSET ALZHEIMER'S DISEASE WITHOUT BEHAVIORAL DISTURBANCE (HCC): Primary | ICD-10-CM

## 2017-10-10 DIAGNOSIS — G30.1 LATE ONSET ALZHEIMER'S DISEASE WITHOUT BEHAVIORAL DISTURBANCE (HCC): Primary | ICD-10-CM

## 2017-10-10 DIAGNOSIS — S09.90XA INJURY OF HEAD, INITIAL ENCOUNTER: ICD-10-CM

## 2017-10-10 DIAGNOSIS — G89.29 CHRONIC MIDLINE LOW BACK PAIN, WITH SCIATICA PRESENCE UNSPECIFIED: ICD-10-CM

## 2017-10-10 DIAGNOSIS — S05.11XA ECCHYMOSIS OF RIGHT EYE: ICD-10-CM

## 2017-10-10 PROCEDURE — 70450 CT HEAD/BRAIN W/O DYE: CPT

## 2017-10-10 PROCEDURE — 99205 OFFICE O/P NEW HI 60 MIN: CPT | Performed by: PSYCHIATRY & NEUROLOGY

## 2017-10-10 RX ORDER — DULOXETIN HYDROCHLORIDE 20 MG/1
60 CAPSULE, DELAYED RELEASE ORAL
COMMUNITY
End: 2018-07-02

## 2017-10-10 RX ORDER — ONDANSETRON 4 MG/1
4 TABLET, FILM COATED ORAL
COMMUNITY
Start: 2015-09-04 | End: 2018-02-26

## 2017-10-10 RX ORDER — OMEPRAZOLE 20 MG/1
20 CAPSULE, DELAYED RELEASE ORAL
COMMUNITY
End: 2021-01-01 | Stop reason: HOSPADM

## 2017-10-10 RX ORDER — OXYCODONE HYDROCHLORIDE AND ACETAMINOPHEN 5; 325 MG/1; MG/1
1 TABLET ORAL
COMMUNITY
Start: 2015-12-29 | End: 2018-02-26

## 2017-10-10 RX ORDER — DONEPEZIL HYDROCHLORIDE 5 MG/1
5 TABLET, FILM COATED ORAL NIGHTLY
Qty: 30 TABLET | Refills: 2 | Status: SHIPPED | OUTPATIENT
Start: 2017-10-10 | End: 2017-12-04 | Stop reason: SDUPTHER

## 2017-10-10 RX ORDER — ASCORBIC ACID 500 MG
500 TABLET ORAL
COMMUNITY
Start: 2015-09-04 | End: 2018-02-26

## 2017-10-10 RX ORDER — CITALOPRAM 40 MG/1
40 TABLET ORAL DAILY
COMMUNITY
End: 2017-10-23 | Stop reason: SDUPTHER

## 2017-10-10 NOTE — PROGRESS NOTES
Subjective:     Patient ID: Sandra Mane is a 72 y.o. female.    History of Present Illness  The following portions of the patient's history were reviewed and updated as appropriate: allergies, current medications, past family history, past medical history, past social history, past surgical history and problem list.  Dementia: onset of STm loss x 12 months, and had testing on 9/8/17 by [facility]= FSIQ-4th %  Processing speed <1st %  WM-6th %   PIQ-8%  VIQ-23 rd%  Misplaces things, doesn't drive, may put silvar ware in wrong  Areas, recall of vacations movies, etc. Speech has been OK. No headaches, dizziness, (+hx of migraines in past) .   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      Couple yrs ago fainted in shower, was pale, no injury, no testing.    Otherwise  No TIA etc.     LBP-chronic, causes depression x 10 yrs, w scoliosis. Reviewed MRI. Sees Dr. Anaya for morphine pills at 75 bid.  No new meds . GBP - 400 qid.  Not much walking, < 1 block.    Fell today, with black eye OD, no LOC.   Review of Systems   Constitutional: Positive for fatigue. Negative for activity change and appetite change.   HENT: Positive for facial swelling (Due to fall last night) and trouble swallowing. Negative for ear pain.    Eyes: Positive for pain (Due to fall last evening). Negative for photophobia and visual disturbance.   Respiratory: Positive for cough. Negative for choking, chest tightness and shortness of breath.    Cardiovascular: Negative for chest pain, palpitations and leg swelling.   Gastrointestinal: Positive for constipation. Negative for abdominal pain and nausea.   Endocrine: Negative for polydipsia, polyphagia and polyuria.   Genitourinary: Negative for difficulty urinating, frequency and urgency.   Musculoskeletal: Positive for back pain, gait problem and neck pain.   Skin: Negative for color change and rash.   Allergic/Immunologic: Negative for environmental allergies, food allergies and immunocompromised state.    Neurological: Positive for headaches. Negative for dizziness, tremors, seizures, syncope, facial asymmetry, speech difficulty, weakness, light-headedness and numbness.   Hematological: Negative for adenopathy. Does not bruise/bleed easily.   Psychiatric/Behavioral: Positive for confusion and decreased concentration. Negative for agitation, behavioral problems, dysphoric mood, hallucinations, self-injury, sleep disturbance and suicidal ideas. The patient is not nervous/anxious and is not hyperactive.         Objective:    Neurologic Exam     Mental Status   Disoriented to person.   Disoriented to place.   Disoriented to time. Oriented to year.   Attention: normal. Concentration: decreased.   Speech: speech is normal   Level of consciousness: alert  Knowledge: inconsistent with education.   Able to write. Abnormal comprehension.        Review of memory test (see HPI)   Today- abnormal clock draw , with all the numbers put on the left side, and inability to place hands.  Full MOCA- missed all points for STM, and total score is     14/30- moderate AD     Cranial Nerves     CN II   Visual fields full to confrontation.     CN III, IV, VI   Pupils are equal, round, and reactive to light.  Extraocular motions are normal.     CN V   Facial sensation intact.     CN VII   Facial expression full, symmetric.     CN IX, X   CN IX normal.     CN XI   CN XI normal.     CN XII   CN XII normal.        Fell today w ecchymosis on right face. Very alert     Motor Exam   Muscle bulk: normal  Overall muscle tone: normal  Right arm pronator drift: absent  Left arm pronator drift: absent       No wkness x 4     Sensory Exam   Light touch normal.   Vibration normal.   Proprioception normal.     Gait, Coordination, and Reflexes     Coordination   Heel to shin coordination: normal    Tremor   Resting tremor: absent  Intention tremor: absent  Action tremor: absent    Reflexes   Right brachioradialis: 2+  Left brachioradialis: 2+  Right  biceps: 2+  Left biceps: 2+  Right triceps: 2+  Left triceps: 2+  Right patellar: 2+  Left patellar: 2+  Right achilles: 2+  Left achilles: 2+  Right : 2+  Left : 2+  Right plantar: normal  Left plantar: normal  Right ankle clonus: absent  Left ankle clonus: absent       gaIT VERY PAINFU;L NARROW BASE. RISES SLOWSLY FROM CHAIR       Physical Exam   Constitutional: She appears well-developed and well-nourished.   Eyes: EOM are normal. Pupils are equal, round, and reactive to light.   Neck: Normal range of motion. Neck supple.   Cardiovascular: Normal rate.    Pulmonary/Chest: Effort normal.   Abdominal: Bowel sounds are normal.   Neurological: She has a normal Heel to Farnsworth Test.   Reflex Scores:       Tricep reflexes are 2+ on the right side and 2+ on the left side.       Bicep reflexes are 2+ on the right side and 2+ on the left side.       Brachioradialis reflexes are 2+ on the right side and 2+ on the left side.       Patellar reflexes are 2+ on the right side and 2+ on the left side.       Achilles reflexes are 2+ on the right side and 2+ on the left side.  Psychiatric: Her speech is normal.   Abnormal MOCA   Nursing note and vitals reviewed.      Assessment/Plan:       Problems Addressed this Visit        Unprioritized    Back pain    Head injury    Relevant Orders    CT Head Without Contrast    Late onset Alzheimer's disease without behavioral disturbance - Primary    Relevant Medications    DULoxetine (CYMBALTA) 20 MG capsule    citalopram (CeleXA) 40 MG tablet    donepezil (ARICEPT) 5 MG tablet    Other Relevant Orders    Methylmalonic Acid, Serum    TSH    Ecchymosis of right eye         Back pain is chronic and a care of pain management.  There are no motor or sensory deficits today on exam of the lower extremities    Head injury today-she will need a brain CT.  I reviewed the last scan from March.  She cannot have MRI because of a pain stimulator.  She cannot have contrast because of the elevated  creatinine.  We will look for any type of intracranial injury    Alzheimer's disease-fairly advanced with a Mont[place]al score only 14 out of 30.  I began Aricept 5 mg daily.  He can aggravate gastric conditions.  They can also cause insomnia and other side effects.  These were reviewed.  There is no history of cardiac arrhythmia.  In 1 month they should call back the PCP to have the dose increased to 10 mg.  Toward the end of this year I would advocate adding Namenda.  Then next would be to combine Namenda and Aricept into the single pill  namzaric     I would recommend repeat of the neuropsychology testing in about one year which can also be done through her PCP office.    I have not scheduled follow-up at this time.  Family will likely need help in the near future as it was just her and her  at home although kids live nearby.

## 2017-10-10 NOTE — PATIENT INSTRUCTIONS
Donepezil tablets  What is this medicine?  DONEPEZIL (mcmahan NEP e zil) is used to treat mild to moderate dementia caused by Alzheimer's disease.  This medicine may be used for other purposes; ask your health care provider or pharmacist if you have questions.  COMMON BRAND NAME(S): Aricept  What should I tell my health care provider before I take this medicine?  They need to know if you have any of these conditions:  -asthma or other lung disease  -difficulty passing urine  -head injury  -heart disease  -history of irregular heartbeat  -liver disease  -seizures (convulsions)  -stomach or intestinal disease, ulcers or stomach bleeding  -an unusual or allergic reaction to donepezil, other medicines, foods, dyes, or preservatives  -pregnant or trying to get pregnant  -breast-feeding  How should I use this medicine?  Take this medicine by mouth with a glass of water. Follow the directions on the prescription label. You may take this medicine with or without food. Take this medicine at regular intervals. This medicine is usually taken before bedtime. Do not take it more often than directed. Continue to take your medicine even if you feel better. Do not stop taking except on your doctor's advice.  If you are taking the 23 mg donepezil tablet, swallow it whole; do not cut, crush, or chew it.  Talk to your pediatrician regarding the use of this medicine in children. Special care may be needed.  Overdosage: If you think you have taken too much of this medicine contact a poison control center or emergency room at once.  NOTE: This medicine is only for you. Do not share this medicine with others.  What if I miss a dose?  If you miss a dose, take it as soon as you can. If it is almost time for your next dose, take only that dose, do not take double or extra doses.  What may interact with this medicine?  Do not take this medicine with any of the following medications:  -certain medicines for fungal infections like itraconazole,  fluconazole, posaconazole, and voriconazole  -cisapride  -dextromethorphan; quinidine  -dofetilide  -dronedarone  -pimozide  -quinidine  -thioridazine  -ziprasidone  This medicine may also interact with the following medications:  -antihistamines for allergy, cough and cold  -atropine  -bethanechol  -carbamazepine  -certain medicines for bladder problems like oxybutynin, tolterodine  -certain medicines for Parkinson's disease like benztropine, trihexyphenidyl  -certain medicines for stomach problems like dicyclomine, hyoscyamine  -certain medicines for travel sickness like scopolamine  -dexamethasone  -ipratropium  -NSAIDs, medicines for pain and inflammation, like ibuprofen or naproxen  -other medicines for Alzheimer's disease  -other medicines that prolong the QT interval (cause an abnormal heart rhythm)  -phenobarbital  -phenytoin  -rifampin, rifabutin or rifapentine  This list may not describe all possible interactions. Give your health care provider a list of all the medicines, herbs, non-prescription drugs, or dietary supplements you use. Also tell them if you smoke, drink alcohol, or use illegal drugs. Some items may interact with your medicine.  What should I watch for while using this medicine?  Visit your doctor or health care professional for regular checks on your progress. Check with your doctor or health care professional if your symptoms do not get better or if they get worse.  You may get drowsy or dizzy. Do not drive, use machinery, or do anything that needs mental alertness until you know how this drug affects you.  What side effects may I notice from receiving this medicine?  Side effects that you should report to your doctor or health care professional as soon as possible:  -allergic reactions like skin rash, itching or hives, swelling of the face, lips, or tongue  -changes in vision  -feeling faint or lightheaded, falls  -problems with balance  -redness, blistering, peeling or loosening of the  skin, including inside the mouth  -slow heartbeat, or palpitations  -stomach pain  -unusual bleeding or bruising, red or purple spots on the skin  -vomiting  -weight loss  Side effects that usually do not require medical attention (report to your doctor or health care professional if they continue or are bothersome):  -diarrhea, especially when starting treatment  -headache  -indigestion or heartburn  -loss of appetite  -muscle cramps  -nausea  This list may not describe all possible side effects. Call your doctor for medical advice about side effects. You may report side effects to FDA at 2-835-FDA-9053.  Where should I keep my medicine?  Keep out of reach of children.  Store at room temperature between 15 and 30 degrees C (59 and 86 degrees F). Throw away any unused medicine after the expiration date.  NOTE: This sheet is a summary. It may not cover all possible information. If you have questions about this medicine, talk to your doctor, pharmacist, or health care provider.     © 2017, [employer]/Gold Standard. (2015-07-30 07:51:52)Donepezil; Memantine extended-release capsule  What is this medicine?  DONEPEZIL; MEMANTINE (mcmahan NEP e zil; MEM an teen) is used to treat dementia caused by Alzheimer's disease.  This medicine may be used for other purposes; ask your health care provider or pharmacist if you have questions.  COMMON BRAND NAME(S): Namzaric  What should I tell my health care provider before I take this medicine?  They need to know if you have any of these conditions:  -difficulty passing urine  -head injury  -heart disease  -irregular heartbeat  -kidney disease  -liver disease  -lung or breathing disease, like asthma  -seizures  -stomach or intestinal disease, ulcers or stomach bleeding  -an unusual or allergic reaction to donepezil, memantine, other medicines, foods, dyes, or preservatives  -pregnant or trying to get pregnant  -breast-feeding  How should I use this medicine?  Take this medicine by mouth  with a glass of water. Follow the directions on the prescription label. You may take this medicine with or without food. Swallow the capsules whole. Do not chew or crush. If swallowing is difficult, you may open the capsules and sprinkle the entire contents on cool applesauce before swallowing. Take your doses at regular intervals. Do not take your medicine more often than directed. Continue to take your medicine even if you feel better. Do not stop taking except on the advice of your doctor or health care professional.  Talk to your pediatrician regarding the use of this medicine in children. Special care may be needed.  Overdosage: If you think you have taken too much of this medicine contact a poison control center or emergency room at once.  NOTE: This medicine is only for you. Do not share this medicine with others.  What if I miss a dose?  If you miss a dose, take it as soon as you can. If it is almost time for your next dose, take only that dose. Do not take double or extra doses. If you do not take your medicine for several days, contact your health care provider. Your dose may need to be changed.  What may interact with this medicine?  Do not take this medicine with any of the following medications:  -certain medicines for fungal infections like itraconazole, fluconazole, posaconazole, and voriconazole  -cisapride  -dextromethorphan; quinidine  -dofetilide  -dronedarone  -pimozide  -quinidine  -thioridazine  -ziprasidone  This medicine may also interact with the following medications:  -acetazolamide  -antihistamines for allergy, cough and cold  -atropine  -bethanechol  -carbamazepine  -certain medicines for bladder problems like oxybutynin, tolterodine  -certain medicines for Parkinson's disease like benztropine, trihexyphenidyl  -certain medicines for stomach problems like dicyclomine, hyoscyamine  -certain medicines for travel sickness like scopolamine  -cimetidine  -dexamethasone  -hydrochlorothiazide  (HCTZ)  -ketamine  -ipratropium  -metformin  -methazolamide  -nicotine  -NSAIDs, medicines for pain and inflammation, like ibuprofen or naproxen  -other medicines for Alzheimer's disease  -other medicines that prolong the QT interval (cause an abnormal heart rhythm)  -phenobarbital  -phenytoin  -ranitidine  -rifampin, rifabutin or rifapentine  -sodium bicarbonate  -succinylcholine  -triamterene  This list may not describe all possible interactions. Give your health care provider a list of all the medicines, herbs, non-prescription drugs, or dietary supplements you use. Also tell them if you smoke, drink alcohol, or use illegal drugs. Some items may interact with your medicine.  What should I watch for while using this medicine?  Visit your doctor or health care professional for regular checks on your progress. Check with your doctor or health care professional if there is no improvement in your symptoms or if they get worse.  You may get drowsy or dizzy. Do not drive, use machinery, or do anything that needs mental alertness until you know how this drug affects you. Do not stand or sit up quickly, especially if you are an older patient. This reduces the risk of dizzy or fainting spells. Alcohol can make you more drowsy and dizzy. Avoid alcoholic drinks.  If you are going to need surgery or other procedure, tell your doctor that you are using this medicine.  What side effects may I notice from receiving this medicine?  Side effects that you should report to your doctor or health care professional as soon as possible:  -allergic reactions like skin rash, itching or hives, swelling of the face, lips, or tongue  -changes in vision  -depressed mood  -feeling faint or lightheaded, falls  -hallucinations  -problems with balance  -redness, blistering, peeling or loosening of the skin, including inside the mouth  -seizures  -slow heartbeat, or palpitations  -stomach pain  -unusual bleeding or bruising, red or purple spots on  the skin  -vomiting  -weight loss  Side effects that usually do not require medical attention (report to your doctor or health care professional if they continue or are bothersome):  -diarrhea  -dizziness  -headache  -indigestion or heartburn  -loss of appetite  -nausea  This list may not describe all possible side effects. Call your doctor for medical advice about side effects. You may report side effects to FDA at 2-206-WGC-3106.  Where should I keep my medicine?  Keep out of the reach of children.  Store at room temperature between 15 and 30 degrees C (59 and 86 degrees F). Throw away any unused medicine after the expiration date.  NOTE: This sheet is a summary. It may not cover all possible information. If you have questions about this medicine, talk to your doctor, pharmacist, or health care provider.     © 2017, [employer]/Gold Standard. (2017-01-19 11:45:49)  Memantine Tablets  What is this medicine?  MEMANTINE (MEM an teen) is used to treat dementia caused by Alzheimer's disease.  This medicine may be used for other purposes; ask your health care provider or pharmacist if you have questions.  COMMON BRAND NAME(S): Namenda  What should I tell my health care provider before I take this medicine?  They need to know if you have any of these conditions:  -difficulty passing urine  -kidney disease  -liver disease  -seizures  -an unusual or allergic reaction to memantine, other medicines, foods, dyes, or preservatives  -pregnant or trying to get pregnant  -breast-feeding  How should I use this medicine?  Take this medicine by mouth with a glass of water. Follow the directions on the prescription label. You may take this medicine with or without food. Take your doses at regular intervals. Do not take your medicine more often than directed. Continue to take your medicine even if you feel better. Do not stop taking except on the advice of your doctor or health care professional.  Talk to your pediatrician regarding the  use of this medicine in children. Special care may be needed.  Overdosage: If you think you have taken too much of this medicine contact a poison control center or emergency room at once.  NOTE: This medicine is only for you. Do not share this medicine with others.  What if I miss a dose?  If you miss a dose, take it as soon as you can. If it is almost time for your next dose, take only that dose. Do not take double or extra doses. If you do not take your medicine for several days, contact your health care provider. Your dose may need to be changed.  What may interact with this medicine?  -acetazolamide  -amantadine  -cimetidine  -dextromethorphan  -dofetilide  -hydrochlorothiazide  -ketamine  -metformin  -methazolamide  -quinidine  -ranitidine  -sodium bicarbonate  -triamterene  This list may not describe all possible interactions. Give your health care provider a list of all the medicines, herbs, non-prescription drugs, or dietary supplements you use. Also tell them if you smoke, drink alcohol, or use illegal drugs. Some items may interact with your medicine.  What should I watch for while using this medicine?  Visit your doctor or health care professional for regular checks on your progress. Check with your doctor or health care professional if there is no improvement in your symptoms or if they get worse.  You may get drowsy or dizzy. Do not drive, use machinery, or do anything that needs mental alertness until you know how this drug affects you. Do not stand or sit up quickly, especially if you are an older patient. This reduces the risk of dizzy or fainting spells. Alcohol can make you more drowsy and dizzy. Avoid alcoholic drinks.  What side effects may I notice from receiving this medicine?  Side effects that you should report to your doctor or health care professional as soon as possible:  -allergic reactions like skin rash, itching or hives, swelling of the face, lips, or tongue  -agitation or a feeling of  restlessness  -depressed mood  -dizziness  -hallucinations  -redness, blistering, peeling or loosening of the skin, including inside the mouth  -seizures  -vomiting  Side effects that usually do not require medical attention (report to your doctor or health care professional if they continue or are bothersome):  -constipation  -diarrhea  -headache  -nausea  -trouble sleeping  This list may not describe all possible side effects. Call your doctor for medical advice about side effects. You may report side effects to FDA at 8-078-WYF-9616.  Where should I keep my medicine?  Keep out of the reach of children.  Store at room temperature between 15 degrees and 30 degrees C (59 degrees and 86 degrees F). Throw away any unused medicine after the expiration date.  NOTE: This sheet is a summary. It may not cover all possible information. If you have questions about this medicine, talk to your doctor, pharmacist, or health care provider.     © 2017, [employer]. (2014-10-06 14:10:42)

## 2017-10-10 NOTE — TELEPHONE ENCOUNTER
Spoke with Lorena at Dr. Garvin office to give results of CT Head exam at 11;15 am.  Ct Head exam was negative for bleed and fracture per Dr. Quezada.

## 2017-10-12 ENCOUNTER — TELEPHONE (OUTPATIENT)
Dept: NEUROLOGY | Facility: CLINIC | Age: 72
End: 2017-10-12

## 2017-10-12 NOTE — TELEPHONE ENCOUNTER
----- Message from Nata Medina sent at 10/12/2017 11:39 AM EDT -----  Contact: [phone]  Patient she started taking the Aricept that  prescribed to her. Every since she started the medication, she has been confused and she also states that the medication put her right to sleep. She is sort of concerned about this medication.

## 2017-10-13 NOTE — TELEPHONE ENCOUNTER
I understand.  Lets stop it for 4days, then try again at one pill every other day.  If the side effects continue then stop it entirely and we can try namenda.

## 2017-10-23 RX ORDER — CITALOPRAM 40 MG/1
TABLET ORAL
Qty: 30 TABLET | Refills: 4 | Status: SHIPPED | OUTPATIENT
Start: 2017-10-23 | End: 2018-02-22 | Stop reason: SDUPTHER

## 2017-12-04 ENCOUNTER — OFFICE VISIT (OUTPATIENT)
Dept: INTERNAL MEDICINE | Facility: CLINIC | Age: 72
End: 2017-12-04

## 2017-12-04 VITALS
BODY MASS INDEX: 23.86 KG/M2 | OXYGEN SATURATION: 97 % | HEIGHT: 72 IN | WEIGHT: 176.2 LBS | DIASTOLIC BLOOD PRESSURE: 64 MMHG | HEART RATE: 61 BPM | TEMPERATURE: 98.1 F | SYSTOLIC BLOOD PRESSURE: 120 MMHG

## 2017-12-04 DIAGNOSIS — G30.1 LATE ONSET ALZHEIMER'S DISEASE WITHOUT BEHAVIORAL DISTURBANCE (HCC): Primary | ICD-10-CM

## 2017-12-04 DIAGNOSIS — F02.80 LATE ONSET ALZHEIMER'S DISEASE WITHOUT BEHAVIORAL DISTURBANCE (HCC): Primary | ICD-10-CM

## 2017-12-04 PROCEDURE — 99213 OFFICE O/P EST LOW 20 MIN: CPT | Performed by: FAMILY MEDICINE

## 2017-12-04 RX ORDER — DONEPEZIL HYDROCHLORIDE 5 MG/1
5 TABLET, FILM COATED ORAL NIGHTLY
COMMUNITY
End: 2017-12-04

## 2017-12-04 RX ORDER — DONEPEZIL HYDROCHLORIDE 10 MG/1
10 TABLET, FILM COATED ORAL NIGHTLY
Qty: 30 TABLET | Refills: 5 | Status: SHIPPED | OUTPATIENT
Start: 2017-12-04 | End: 2018-01-04 | Stop reason: SDUPTHER

## 2017-12-04 NOTE — PROGRESS NOTES
Subjective     Sandra Mane is a 72 y.o. female, who presents with a chief complaint of   Chief Complaint   Patient presents with   • Alzheimer's Disease       HPI     Pt here to f/u on Alzheimer's dementia.  She has had neuropsych testing and has seen Dr. Cole.  She is taking donepezil and is tolerating it.  She reports that she gets confused at times but doesn't have any other complaints today about her memory.    The following portions of the patient's history were reviewed and updated as appropriate: allergies, current medications, past family history, past medical history, past social history, past surgical history and problem list.    Allergies: Review of patient's allergies indicates no known allergies.    Review of Systems   Constitutional: Negative.    HENT: Negative.    Eyes: Negative.    Respiratory: Negative.    Cardiovascular: Negative.    Gastrointestinal: Negative.    Endocrine: Negative.    Genitourinary: Negative.    Musculoskeletal: Positive for back pain.   Skin: Negative.    Allergic/Immunologic: Negative.    Neurological: Negative.    Hematological: Negative.    Psychiatric/Behavioral: Positive for confusion. The patient is nervous/anxious.        Objective     Wt Readings from Last 3 Encounters:   12/04/17 176 lb 3.2 oz (79.9 kg)   10/10/17 186 lb (84.4 kg)   08/24/17 187 lb 3.2 oz (84.9 kg)     Temp Readings from Last 3 Encounters:   12/04/17 98.1 °F (36.7 °C)   08/24/17 98.5 °F (36.9 °C)   05/22/17 97.9 °F (36.6 °C)     BP Readings from Last 3 Encounters:   12/04/17 120/64   10/10/17 140/90   08/24/17 130/70     Pulse Readings from Last 3 Encounters:   12/04/17 61   10/10/17 74   08/24/17 73     Body mass index is 21.45 kg/(m^2).  SpO2 Readings from Last 3 Encounters:   12/04/17 97%   10/10/17 95%   08/24/17 96%       Physical Exam   Constitutional: She is oriented to person, place, and time. She appears well-developed and well-nourished.   HENT:   Head: Normocephalic and atraumatic.    Nose: Nose normal.   Eyes: Conjunctivae and EOM are normal. No scleral icterus.   Neck: Normal range of motion. Neck supple.   Pulmonary/Chest: Effort normal. No respiratory distress.   Abdominal: There is no hepatosplenomegaly.   Musculoskeletal: She exhibits no edema or deformity.   Back not examined.   Neurological: She is alert and oriented to person, place, and time.   Skin: Skin is warm and dry. No rash noted.   Psychiatric: She has a normal mood and affect. Her behavior is normal.       Assessment/Plan   Sandra was seen today for alzheimer's disease.    Diagnoses and all orders for this visit:    Late onset Alzheimer's disease without behavioral disturbance  -     donepezil (ARICEPT) 10 MG tablet; Take 1 tablet by mouth Every Night.    Dr. Cole's note and recommendations were reviewed.  Increase donepezil to 10 mg daily.  At next visit will initiate Namenda and then eventually change to Namzaric.  Repeat neuropsych testing in about one year.      Outpatient Medications Prior to Visit   Medication Sig Dispense Refill   • ascorbic acid (VITAMIN C) 500 MG tablet Take 500 mg by mouth.     • buPROPion XL (WELLBUTRIN XL) 150 MG 24 hr tablet TAKE ONE TABLET BY MOUTH DAILY 30 tablet 4   • citalopram (CeleXA) 40 MG tablet TAKE ONE TABLET BY MOUTH DAILY 30 tablet 4   • clonazePAM (KlonoPIN) 0.5 MG tablet Take 2 tablets by mouth At Night As Needed for Seizures. 60 tablet 2   • DULoxetine (CYMBALTA) 20 MG capsule Take 60 mg by mouth.     • gabapentin (NEURONTIN) 400 MG capsule Take 400 mg by mouth every 4 (four) hours.     • Morphine (MS CONTIN) 60 MG 12 hr tablet Take 60 mg by mouth 2 (Two) Times a Day.     • Morphine (MSIR) 15 MG tablet Take 15 mg by mouth 2 (Two) Times a Day.     • omeprazole (priLOSEC) 20 MG capsule Take 20 mg by mouth.     • ondansetron (ZOFRAN) 4 MG tablet Take 4 mg by mouth.     • oxyCODONE-acetaminophen (PERCOCET) 5-325 MG per tablet Take 1 tablet by mouth.     • donepezil (ARICEPT) 5 MG tablet  Take 1 tablet by mouth Every Night. 30 tablet 2     No facility-administered medications prior to visit.      New Medications Ordered This Visit   Medications   • donepezil (ARICEPT) 10 MG tablet     Sig: Take 1 tablet by mouth Every Night.     Dispense:  30 tablet     Refill:  5     [unfilled]  Medications Discontinued During This Encounter   Medication Reason   • donepezil (ARICEPT) 5 MG tablet    • donepezil (ARICEPT) 5 MG tablet Reorder         Return for Next scheduled follow up.

## 2017-12-08 DIAGNOSIS — F41.8 DEPRESSION WITH ANXIETY: ICD-10-CM

## 2017-12-08 RX ORDER — BUPROPION HYDROCHLORIDE 150 MG/1
TABLET ORAL
Qty: 30 TABLET | Refills: 3 | Status: SHIPPED | OUTPATIENT
Start: 2017-12-08 | End: 2018-02-26 | Stop reason: SDUPTHER

## 2018-01-04 DIAGNOSIS — G30.1 LATE ONSET ALZHEIMER'S DISEASE WITHOUT BEHAVIORAL DISTURBANCE (HCC): ICD-10-CM

## 2018-01-04 DIAGNOSIS — F02.80 LATE ONSET ALZHEIMER'S DISEASE WITHOUT BEHAVIORAL DISTURBANCE (HCC): ICD-10-CM

## 2018-01-04 RX ORDER — DONEPEZIL HYDROCHLORIDE 10 MG/1
10 TABLET, FILM COATED ORAL NIGHTLY
Qty: 30 TABLET | Refills: 5 | Status: SHIPPED | OUTPATIENT
Start: 2018-01-04 | End: 2018-07-02

## 2018-02-19 DIAGNOSIS — E78.5 HYPERLIPIDEMIA, UNSPECIFIED HYPERLIPIDEMIA TYPE: ICD-10-CM

## 2018-02-19 DIAGNOSIS — F41.8 DEPRESSION WITH ANXIETY: ICD-10-CM

## 2018-02-19 DIAGNOSIS — N18.30 CKD (CHRONIC KIDNEY DISEASE) STAGE 3, GFR 30-59 ML/MIN (HCC): Primary | ICD-10-CM

## 2018-02-19 DIAGNOSIS — R41.3 MEMORY CHANGES: ICD-10-CM

## 2018-02-19 DIAGNOSIS — M19.90 ARTHRITIS: ICD-10-CM

## 2018-02-21 LAB
METHYLMALONATE SERPL-SCNC: 285 NMOL/L (ref 0–378)
TSH SERPL DL<=0.005 MIU/L-ACNC: 4.05 MIU/ML (ref 0.27–4.2)

## 2018-02-22 RX ORDER — CITALOPRAM 40 MG/1
TABLET ORAL
Qty: 30 TABLET | Refills: 3 | Status: SHIPPED | OUTPATIENT
Start: 2018-02-22 | End: 2018-06-25 | Stop reason: SDUPTHER

## 2018-02-24 ENCOUNTER — RESULTS ENCOUNTER (OUTPATIENT)
Dept: INTERNAL MEDICINE | Facility: CLINIC | Age: 73
End: 2018-02-24

## 2018-02-24 DIAGNOSIS — E78.5 HYPERLIPIDEMIA, UNSPECIFIED HYPERLIPIDEMIA TYPE: ICD-10-CM

## 2018-02-24 DIAGNOSIS — M19.90 ARTHRITIS: ICD-10-CM

## 2018-02-24 DIAGNOSIS — F41.8 DEPRESSION WITH ANXIETY: ICD-10-CM

## 2018-02-24 DIAGNOSIS — N18.30 CKD (CHRONIC KIDNEY DISEASE) STAGE 3, GFR 30-59 ML/MIN (HCC): ICD-10-CM

## 2018-02-24 DIAGNOSIS — F41.9 ANXIETY: ICD-10-CM

## 2018-02-24 DIAGNOSIS — R41.3 MEMORY CHANGES: ICD-10-CM

## 2018-02-26 ENCOUNTER — OFFICE VISIT (OUTPATIENT)
Dept: INTERNAL MEDICINE | Facility: CLINIC | Age: 73
End: 2018-02-26

## 2018-02-26 VITALS
BODY MASS INDEX: 26.14 KG/M2 | WEIGHT: 193 LBS | OXYGEN SATURATION: 96 % | DIASTOLIC BLOOD PRESSURE: 72 MMHG | TEMPERATURE: 99.6 F | HEIGHT: 72 IN | SYSTOLIC BLOOD PRESSURE: 140 MMHG | HEART RATE: 72 BPM | RESPIRATION RATE: 24 BRPM

## 2018-02-26 DIAGNOSIS — F41.8 DEPRESSION WITH ANXIETY: ICD-10-CM

## 2018-02-26 DIAGNOSIS — G30.1 LATE ONSET ALZHEIMER'S DISEASE WITHOUT BEHAVIORAL DISTURBANCE (HCC): ICD-10-CM

## 2018-02-26 DIAGNOSIS — M54.5 CHRONIC MIDLINE LOW BACK PAIN, WITH SCIATICA PRESENCE UNSPECIFIED: ICD-10-CM

## 2018-02-26 DIAGNOSIS — D64.9 ANEMIA, UNSPECIFIED TYPE: ICD-10-CM

## 2018-02-26 DIAGNOSIS — F02.80 LATE ONSET ALZHEIMER'S DISEASE WITHOUT BEHAVIORAL DISTURBANCE (HCC): ICD-10-CM

## 2018-02-26 DIAGNOSIS — N18.30 CKD (CHRONIC KIDNEY DISEASE) STAGE 3, GFR 30-59 ML/MIN (HCC): ICD-10-CM

## 2018-02-26 DIAGNOSIS — J06.9 ACUTE URI: ICD-10-CM

## 2018-02-26 DIAGNOSIS — G89.29 CHRONIC MIDLINE LOW BACK PAIN, WITH SCIATICA PRESENCE UNSPECIFIED: ICD-10-CM

## 2018-02-26 DIAGNOSIS — R06.83 SNORING: ICD-10-CM

## 2018-02-26 DIAGNOSIS — E78.5 HYPERLIPIDEMIA, UNSPECIFIED HYPERLIPIDEMIA TYPE: Primary | ICD-10-CM

## 2018-02-26 PROCEDURE — 99214 OFFICE O/P EST MOD 30 MIN: CPT | Performed by: FAMILY MEDICINE

## 2018-02-26 RX ORDER — BUPROPION HYDROCHLORIDE 300 MG/1
300 TABLET ORAL EVERY MORNING
Qty: 30 TABLET | Refills: 6 | Status: SHIPPED | OUTPATIENT
Start: 2018-02-26 | End: 2018-09-27 | Stop reason: SDUPTHER

## 2018-02-26 RX ORDER — MEMANTINE HYDROCHLORIDE 5 MG/1
10 TABLET ORAL DAILY
Qty: 60 TABLET | Refills: 5 | Status: SHIPPED | OUTPATIENT
Start: 2018-02-26 | End: 2018-06-04

## 2018-02-26 RX ORDER — CLONAZEPAM 0.5 MG/1
TABLET ORAL
Qty: 60 TABLET | Refills: 2 | OUTPATIENT
Start: 2018-02-26 | End: 2018-05-30 | Stop reason: SDUPTHER

## 2018-02-26 RX ORDER — BUPROPION HYDROCHLORIDE 300 MG/1
300 TABLET ORAL EVERY MORNING
Qty: 1 TABLET | Refills: 6 | Status: SHIPPED | OUTPATIENT
Start: 2018-02-26 | End: 2018-02-26 | Stop reason: SDUPTHER

## 2018-02-26 NOTE — PROGRESS NOTES
Subjective     Sandra Mane is a 73 y.o. female, who presents with a chief complaint of   Chief Complaint   Patient presents with   • Hyperlipidemia   • Cough     fever today       Hyperlipidemia     Cough   Associated symptoms include a fever.      1. Dementia.  Pt tolerating the donepezil.  Dr. Cole recommended we start memantine at this visit.    2. Depression with anxiety.  Pt reports she has bad, sad days about 20% of the time.  Denies SI.    3. Chronic back pain.  Pt reports symptoms are stable.  She sees Dr. Rea.      4. Cough.  Low grade fever, congestion.  Occasional sinus pain.  Denies shortness of breath.    The following portions of the patient's history were reviewed and updated as appropriate: allergies, current medications, past family history, past medical history, past social history, past surgical history and problem list.    Allergies: Review of patient's allergies indicates no known allergies.    Review of Systems   Constitutional: Positive for fever.   HENT: Negative.    Eyes: Negative.    Respiratory: Positive for cough.    Cardiovascular: Negative.    Gastrointestinal: Negative.    Endocrine: Negative.    Genitourinary: Negative.    Musculoskeletal: Positive for back pain.   Skin: Negative.    Allergic/Immunologic: Negative.    Neurological: Negative.    Hematological: Negative.    Psychiatric/Behavioral: The patient is nervous/anxious.        Objective     Wt Readings from Last 3 Encounters:   02/26/18 87.5 kg (193 lb)   12/04/17 79.9 kg (176 lb 3.2 oz)   10/10/17 84.4 kg (186 lb)     Temp Readings from Last 3 Encounters:   02/26/18 99.6 °F (37.6 °C)   12/04/17 98.1 °F (36.7 °C)   08/24/17 98.5 °F (36.9 °C)     BP Readings from Last 3 Encounters:   02/26/18 140/72   12/04/17 120/64   10/10/17 140/90     Pulse Readings from Last 3 Encounters:   02/26/18 72   12/04/17 61   10/10/17 74     Body mass index is 23.5 kg/(m^2).  SpO2 Readings from Last 3 Encounters:   02/26/18 96%   12/04/17 97%    10/10/17 95%       Physical Exam   Constitutional: She is oriented to person, place, and time. She appears well-developed and well-nourished.   HENT:   Head: Normocephalic and atraumatic.   Right Ear: External ear normal.   Left Ear: External ear normal.   Nose: Nose normal.   Mouth/Throat: Oropharynx is clear and moist.   Eyes: Conjunctivae and EOM are normal. No scleral icterus.   Neck: Neck supple.   Cardiovascular: Normal rate, regular rhythm and normal heart sounds.  Exam reveals no gallop and no friction rub.    No murmur heard.  Pulmonary/Chest: Effort normal and breath sounds normal. No respiratory distress. She has no wheezes. She has no rales.   Abdominal: Soft. Bowel sounds are normal. There is no hepatosplenomegaly.   Musculoskeletal: She exhibits no edema.   Back not examined.   Lymphadenopathy:     She has no cervical adenopathy.   Neurological: She is alert and oriented to person, place, and time.   Skin: Skin is warm and dry.   Psychiatric: She has a normal mood and affect. Her behavior is normal.   Nursing note and vitals reviewed.      Results for orders placed or performed in visit on 02/19/18   TSH   Result Value Ref Range    TSH 4.050 0.270 - 4.200 mIU/mL   Methylmalonic Acid, Serum   Result Value Ref Range    Methylmalonic Acid 285 0 - 378 nmol/L       Assessment/Plan   Sandra was seen today for hyperlipidemia and cough.    Diagnoses and all orders for this visit:    Hyperlipidemia, unspecified hyperlipidemia type    CKD (chronic kidney disease) stage 3, GFR 30-59 ml/min  -     Comprehensive Metabolic Panel    Depression with anxiety  -     TSH  -     buPROPion XL (WELLBUTRIN XL) 300 MG 24 hr tablet; Take 1 tablet by mouth Every Morning.    Chronic midline low back pain, with sciatica presence unspecified    Snoring    Anemia, unspecified type  -     CBC & Differential  -     Ferritin  -     Vitamin B12  -     Folate RBC    Late onset Alzheimer's disease without behavioral disturbance  -      memantine (NAMENDA) 5 MG tablet; Take 2 tablets by mouth Daily. Start with one tablet daily X 1 week, then increase to two tablets daily.    Acute URI    1. Hyperlipidemia.  Mild. Lifestyle measures. Recheck labs.    2. CKDIII.  Has been stable.  Recheck today.  Avoid NSAIDs.    3. Depression with anxiety. Continue citalopram.  Increase bupropion XL to 300 mg daily.    4. Chronic back pain.  Stable. Per pain management.    5. Snoring.  Declines sleep study.    6. Anemia.  Mild, normocytic.  Studies negative.  Recheck CBC.    7. Alzheimer's.  She has had neuropsych testing and neurology consult.  Dr. Cole's note and recommendations were reviewed.  Increase donepezil to 10 mg daily.  At this visit will initiate Namenda and then eventually change to Namzaric.  Repeat neuropsych testing next time.    8. URI.  Anticipatory guidance.      Outpatient Medications Prior to Visit   Medication Sig Dispense Refill   • citalopram (CeleXA) 40 MG tablet TAKE ONE TABLET BY MOUTH DAILY 30 tablet 3   • clonazePAM (KlonoPIN) 0.5 MG tablet TAKE TWO TABLETS BY MOUTH EVERY NIGHT AT BEDTIME AS NEEDED 60 tablet 2   • donepezil (ARICEPT) 10 MG tablet Take 1 tablet by mouth Every Night. 30 tablet 5   • DULoxetine (CYMBALTA) 20 MG capsule Take 60 mg by mouth.     • gabapentin (NEURONTIN) 400 MG capsule Take 400 mg by mouth every 4 (four) hours.     • Morphine (MS CONTIN) 60 MG 12 hr tablet Take 100 mg by mouth 2 (Two) Times a Day.     • Morphine (MSIR) 15 MG tablet Take 15 mg by mouth 2 (Two) Times a Day.     • omeprazole (priLOSEC) 20 MG capsule Take 20 mg by mouth.     • ascorbic acid (VITAMIN C) 500 MG tablet Take 500 mg by mouth.     • buPROPion XL (WELLBUTRIN XL) 150 MG 24 hr tablet TAKE ONE TABLET BY MOUTH DAILY 30 tablet 3   • ondansetron (ZOFRAN) 4 MG tablet Take 4 mg by mouth.     • oxyCODONE-acetaminophen (PERCOCET) 5-325 MG per tablet Take 1 tablet by mouth.       No facility-administered medications prior to visit.      New  Medications Ordered This Visit   Medications   • memantine (NAMENDA) 5 MG tablet     Sig: Take 2 tablets by mouth Daily. Start with one tablet daily X 1 week, then increase to two tablets daily.     Dispense:  60 tablet     Refill:  5   • buPROPion XL (WELLBUTRIN XL) 300 MG 24 hr tablet     Sig: Take 1 tablet by mouth Every Morning.     Dispense:  1 tablet     Refill:  6     [unfilled]  Medications Discontinued During This Encounter   Medication Reason   • ascorbic acid (VITAMIN C) 500 MG tablet *Therapy completed   • ondansetron (ZOFRAN) 4 MG tablet *Therapy completed   • oxyCODONE-acetaminophen (PERCOCET) 5-325 MG per tablet *Therapy completed   • buPROPion XL (WELLBUTRIN XL) 150 MG 24 hr tablet Reorder         Return in about 6 months (around 8/26/2018).

## 2018-02-27 LAB
ALBUMIN SERPL-MCNC: 4.1 G/DL (ref 3.5–5.2)
ALBUMIN/GLOB SERPL: 1.4 G/DL
ALP SERPL-CCNC: 80 U/L (ref 40–129)
ALT SERPL-CCNC: 12 U/L (ref 5–33)
AST SERPL-CCNC: 19 U/L (ref 5–32)
BASOPHILS # BLD AUTO: 0.03 10*3/MM3 (ref 0–0.2)
BASOPHILS NFR BLD AUTO: 0.5 % (ref 0–2)
BILIRUB SERPL-MCNC: 0.2 MG/DL (ref 0.2–1.2)
BUN SERPL-MCNC: 24 MG/DL (ref 8–23)
BUN/CREAT SERPL: 17.8 (ref 7–25)
CALCIUM SERPL-MCNC: 9 MG/DL (ref 8.8–10.5)
CHLORIDE SERPL-SCNC: 104 MMOL/L (ref 98–107)
CO2 SERPL-SCNC: 27.1 MMOL/L (ref 22–29)
CREAT SERPL-MCNC: 1.35 MG/DL (ref 0.57–1)
EOSINOPHIL # BLD AUTO: 0.28 10*3/MM3 (ref 0.1–0.3)
EOSINOPHIL NFR BLD AUTO: 4.8 % (ref 0–4)
ERYTHROCYTE [DISTWIDTH] IN BLOOD BY AUTOMATED COUNT: 14 % (ref 11.5–14.5)
FERRITIN SERPL-MCNC: 47 NG/ML (ref 13–150)
FOLATE BLD-MCNC: 416.6 NG/ML
FOLATE RBC-MCNC: 1282 NG/ML
GFR SERPLBLD CREATININE-BSD FMLA CKD-EPI: 39 ML/MIN/1.73
GFR SERPLBLD CREATININE-BSD FMLA CKD-EPI: 47 ML/MIN/1.73
GLOBULIN SER CALC-MCNC: 3 GM/DL
GLUCOSE SERPL-MCNC: 107 MG/DL (ref 65–99)
HCT VFR BLD AUTO: 32.5 % (ref 37–47)
HGB BLD-MCNC: 10.1 G/DL (ref 12–16)
IMM GRANULOCYTES # BLD: 0.03 10*3/MM3 (ref 0–0.03)
IMM GRANULOCYTES NFR BLD: 0.5 % (ref 0–0.5)
LYMPHOCYTES # BLD AUTO: 0.99 10*3/MM3 (ref 0.6–4.8)
LYMPHOCYTES NFR BLD AUTO: 17 % (ref 20–45)
MCH RBC QN AUTO: 30.6 PG (ref 27–31)
MCHC RBC AUTO-ENTMCNC: 31.1 G/DL (ref 31–37)
MCV RBC AUTO: 98.5 FL (ref 81–99)
MONOCYTES # BLD AUTO: 0.53 10*3/MM3 (ref 0–1)
MONOCYTES NFR BLD AUTO: 9.1 % (ref 3–8)
NEUTROPHILS # BLD AUTO: 3.96 10*3/MM3 (ref 1.5–8.3)
NEUTROPHILS NFR BLD AUTO: 68.1 % (ref 45–70)
NRBC BLD AUTO-RTO: 0 /100 WBC (ref 0–0)
PLATELET # BLD AUTO: 216 10*3/MM3 (ref 140–500)
POTASSIUM SERPL-SCNC: 5.4 MMOL/L (ref 3.5–5.2)
PROT SERPL-MCNC: 7.1 G/DL (ref 6–8.5)
RBC # BLD AUTO: 3.3 10*6/MM3 (ref 4.2–5.4)
SODIUM SERPL-SCNC: 142 MMOL/L (ref 136–145)
TSH SERPL DL<=0.005 MIU/L-ACNC: 1.57 MIU/ML (ref 0.27–4.2)
VIT B12 SERPL-MCNC: 487 PG/ML (ref 232–1245)
WBC # BLD AUTO: 5.82 10*3/MM3 (ref 4.8–10.8)

## 2018-02-28 ENCOUNTER — TELEPHONE (OUTPATIENT)
Dept: INTERNAL MEDICINE | Facility: CLINIC | Age: 73
End: 2018-02-28

## 2018-02-28 NOTE — TELEPHONE ENCOUNTER
----- Message from Sriram Blanton MD sent at 2/28/2018 10:21 AM EST -----  Her anemia is a little worse.  I would like her to move her labs and visit up to 3 months instead of 6 months.  Thanks.    Pt was given info about labs and she will move apt. Up dg

## 2018-03-09 ENCOUNTER — TELEPHONE (OUTPATIENT)
Dept: INTERNAL MEDICINE | Facility: CLINIC | Age: 73
End: 2018-03-09

## 2018-03-09 NOTE — TELEPHONE ENCOUNTER
Pt's  called and said she has not been able to take the namenda. The meds make her feel real loopy.Her  said he put her back on her previous meds until he hears from you.dg

## 2018-03-22 RX ORDER — BUPROPION HYDROCHLORIDE 150 MG/1
TABLET ORAL
Qty: 30 TABLET | Refills: 5 | Status: SHIPPED | OUTPATIENT
Start: 2018-03-22 | End: 2018-06-04

## 2018-05-30 DIAGNOSIS — F41.9 ANXIETY: ICD-10-CM

## 2018-05-31 RX ORDER — CLONAZEPAM 0.5 MG/1
TABLET ORAL
Qty: 60 TABLET | Refills: 2 | Status: SHIPPED | OUTPATIENT
Start: 2018-05-31 | End: 2018-08-30 | Stop reason: SDUPTHER

## 2018-06-01 LAB
METHYLMALONATE SERPL-SCNC: 331 NMOL/L (ref 0–378)
TSH SERPL DL<=0.005 MIU/L-ACNC: 3.05 MIU/ML (ref 0.27–4.2)

## 2018-06-04 ENCOUNTER — OFFICE VISIT (OUTPATIENT)
Dept: INTERNAL MEDICINE | Facility: CLINIC | Age: 73
End: 2018-06-04

## 2018-06-04 ENCOUNTER — RESULTS ENCOUNTER (OUTPATIENT)
Dept: INTERNAL MEDICINE | Facility: CLINIC | Age: 73
End: 2018-06-04

## 2018-06-04 VITALS
SYSTOLIC BLOOD PRESSURE: 130 MMHG | WEIGHT: 189 LBS | TEMPERATURE: 98.9 F | BODY MASS INDEX: 31.49 KG/M2 | RESPIRATION RATE: 20 BRPM | HEIGHT: 65 IN | OXYGEN SATURATION: 95 % | DIASTOLIC BLOOD PRESSURE: 68 MMHG | HEART RATE: 68 BPM

## 2018-06-04 DIAGNOSIS — F41.8 DEPRESSION WITH ANXIETY: ICD-10-CM

## 2018-06-04 DIAGNOSIS — Z12.11 ENCOUNTER FOR SCREENING FOR MALIGNANT NEOPLASM OF COLON: ICD-10-CM

## 2018-06-04 DIAGNOSIS — F02.80 LATE ONSET ALZHEIMER'S DISEASE WITHOUT BEHAVIORAL DISTURBANCE (HCC): ICD-10-CM

## 2018-06-04 DIAGNOSIS — G89.29 CHRONIC MIDLINE LOW BACK PAIN, WITH SCIATICA PRESENCE UNSPECIFIED: ICD-10-CM

## 2018-06-04 DIAGNOSIS — N18.30 CKD (CHRONIC KIDNEY DISEASE) STAGE 3, GFR 30-59 ML/MIN (HCC): ICD-10-CM

## 2018-06-04 DIAGNOSIS — G30.1 LATE ONSET ALZHEIMER'S DISEASE WITHOUT BEHAVIORAL DISTURBANCE (HCC): ICD-10-CM

## 2018-06-04 DIAGNOSIS — E78.5 HYPERLIPIDEMIA, UNSPECIFIED HYPERLIPIDEMIA TYPE: Primary | ICD-10-CM

## 2018-06-04 DIAGNOSIS — M54.5 CHRONIC MIDLINE LOW BACK PAIN, WITH SCIATICA PRESENCE UNSPECIFIED: ICD-10-CM

## 2018-06-04 DIAGNOSIS — Z12.11 ENCOUNTER FOR SCREENING FOR MALIGNANT NEOPLASM OF COLON: Primary | ICD-10-CM

## 2018-06-04 DIAGNOSIS — K21.9 GASTROESOPHAGEAL REFLUX DISEASE WITHOUT ESOPHAGITIS: ICD-10-CM

## 2018-06-04 DIAGNOSIS — D64.9 ANEMIA, UNSPECIFIED TYPE: ICD-10-CM

## 2018-06-04 PROCEDURE — 99214 OFFICE O/P EST MOD 30 MIN: CPT | Performed by: FAMILY MEDICINE

## 2018-06-04 NOTE — PROGRESS NOTES
Subjective     Sandra Mane is a 73 y.o. female, who presents with a chief complaint of   Chief Complaint   Patient presents with   • Hyperlipidemia     Hyperlipidemia     Cough     1. Dementia.  Pt tolerating the donepezil.  Started memantine last time per Dr. Dr. Cole's recommendations and she discontinued it due to side effects.    2. Depression with anxiety.  Increased bupropion dose last visit and pt reports she is better controlled.  Denies SI.    3. Chronic back pain.  Pt feels that her legs are a little weaker.  She sees Dr. Rea.      The following portions of the patient's history were reviewed and updated as appropriate: allergies, current medications, past family history, past medical history, past social history, past surgical history and problem list.    Allergies: Patient has no known allergies.    Review of Systems   Constitutional: Negative.    HENT: Negative.    Eyes: Negative.    Respiratory: Negative.    Cardiovascular: Negative.    Gastrointestinal: Negative.    Endocrine: Negative.    Genitourinary: Negative.    Musculoskeletal: Positive for back pain.   Skin: Negative.    Allergic/Immunologic: Negative.    Neurological: Negative.    Hematological: Negative.    Psychiatric/Behavioral: The patient is nervous/anxious.      Objective     Wt Readings from Last 3 Encounters:   06/04/18 85.7 kg (189 lb)   02/26/18 87.5 kg (193 lb)   12/04/17 79.9 kg (176 lb 3.2 oz)     Temp Readings from Last 3 Encounters:   06/04/18 98.9 °F (37.2 °C)   02/26/18 99.6 °F (37.6 °C)   12/04/17 98.1 °F (36.7 °C)     BP Readings from Last 3 Encounters:   06/04/18 130/68   02/26/18 140/72   12/04/17 120/64     Pulse Readings from Last 3 Encounters:   06/04/18 68   02/26/18 72   12/04/17 61     Body mass index is 31.45 kg/m².  SpO2 Readings from Last 3 Encounters:   02/26/18 96%   12/04/17 97%   10/10/17 95%       Physical Exam   Constitutional: She is oriented to person, place, and time. She appears well-developed and  well-nourished.   Sitting in wheelchair.   HENT:   Head: Normocephalic and atraumatic.   Right Ear: External ear normal.   Left Ear: External ear normal.   Nose: Nose normal.   Mouth/Throat: Oropharynx is clear and moist.   Eyes: Conjunctivae and EOM are normal.   Neck: Neck supple.   Cardiovascular: Normal rate, regular rhythm and normal heart sounds.  Exam reveals no gallop and no friction rub.    No murmur heard.  Pulmonary/Chest: Effort normal and breath sounds normal. No respiratory distress. She has no wheezes. She has no rales.   Abdominal: Soft. Bowel sounds are normal. There is no hepatosplenomegaly.   Musculoskeletal: She exhibits no edema.   Back not examined.   Lymphadenopathy:     She has no cervical adenopathy.   Neurological: She is alert and oriented to person, place, and time.   Skin: Skin is warm and dry.   Psychiatric: She has a normal mood and affect. Her behavior is normal.   Nursing note and vitals reviewed.    Results for orders placed or performed in visit on 02/26/18   Comprehensive Metabolic Panel   Result Value Ref Range    Glucose 107 (H) 65 - 99 mg/dL    BUN 24 (H) 8 - 23 mg/dL    Creatinine 1.35 (H) 0.57 - 1.00 mg/dL    eGFR Non African Am 39 (L) >60 mL/min/1.73    eGFR  Am 47 (L) >60 mL/min/1.73    BUN/Creatinine Ratio 17.8 7.0 - 25.0    Sodium 142 136 - 145 mmol/L    Potassium 5.4 (H) 3.5 - 5.2 mmol/L    Chloride 104 98 - 107 mmol/L    Total CO2 27.1 22.0 - 29.0 mmol/L    Calcium 9.0 8.8 - 10.5 mg/dL    Total Protein 7.1 6.0 - 8.5 g/dL    Albumin 4.10 3.50 - 5.20 g/dL    Globulin 3.0 gm/dL    A/G Ratio 1.4 g/dL    Total Bilirubin 0.2 0.2 - 1.2 mg/dL    Alkaline Phosphatase 80 40 - 129 U/L    AST (SGOT) 19 5 - 32 U/L    ALT (SGPT) 12 5 - 33 U/L   TSH   Result Value Ref Range    TSH 1.570 0.270 - 4.200 mIU/mL   Ferritin   Result Value Ref Range    Ferritin 47.00 13.00 - 150.00 ng/mL   Vitamin B12   Result Value Ref Range    Vitamin B-12 487 232 - 1,245 pg/mL   Folate RBC   Result  Value Ref Range    Folate, Hemolysate 416.6 Not Estab. ng/mL    RBC Folate 1,282 >498 ng/mL   CBC & Differential   Result Value Ref Range    WBC 5.82 4.80 - 10.80 10*3/mm3    RBC 3.30 (L) 4.20 - 5.40 10*6/mm3    Hemoglobin 10.1 (L) 12.0 - 16.0 g/dL    Hematocrit 32.5 (L) 37.0 - 47.0 %    MCV 98.5 81.0 - 99.0 fL    MCH 30.6 27.0 - 31.0 pg    MCHC 31.1 31.0 - 37.0 g/dL    RDW 14.0 11.5 - 14.5 %    Platelets 216 140 - 500 10*3/mm3    Neutrophil Rel % 68.1 45.0 - 70.0 %    Lymphocyte Rel % 17.0 (L) 20.0 - 45.0 %    Monocyte Rel % 9.1 (H) 3.0 - 8.0 %    Eosinophil Rel % 4.8 (H) 0.0 - 4.0 %    Basophil Rel % 0.5 0.0 - 2.0 %    Neutrophils Absolute 3.96 1.50 - 8.30 10*3/mm3    Lymphocytes Absolute 0.99 0.60 - 4.80 10*3/mm3    Monocytes Absolute 0.53 0.00 - 1.00 10*3/mm3    Eosinophils Absolute 0.28 0.10 - 0.30 10*3/mm3    Basophils Absolute 0.03 0.00 - 0.20 10*3/mm3    Immature Granulocyte Rel % 0.5 0.0 - 0.5 %    Immature Grans Absolute 0.03 0.00 - 0.03 10*3/mm3    nRBC 0.0 0.0 - 0.0 /100 WBC       Assessment/Plan   Sandra was seen today for hyperlipidemia.    Diagnoses and all orders for this visit:    Hyperlipidemia, unspecified hyperlipidemia type    CKD (chronic kidney disease) stage 3, GFR 30-59 ml/min  -     Comprehensive Metabolic Panel    Depression with anxiety  -     TSH    Chronic midline low back pain, with sciatica presence unspecified    Anemia, unspecified type  -     CBC & Differential  -     Ferritin  -     Vitamin B12  -     Folate RBC    Late onset Alzheimer's disease without behavioral disturbance    Gastroesophageal reflux disease without esophagitis    1. Hyperlipidemia.  Mild. Lifestyle measures. Recheck labs today.    2. CKDIII.  Has been stable.  Recheck today.  Avoid NSAIDs.    3. Depression with anxiety. Controlled with citalopram and bupropion XL.    4. Chronic back pain.  Stable. Per pain management.    5. Anemia.  Hemoglobin lower last time.  Recheck CBC and studies.  Cologuard ordered.    6.  Alzheimer's.  She has had neuropsych testing and neurology consult.  Dr. Cole's note and recommendations were reviewed.  Continue donepezil and restart memantine and then eventually change to Namzaric.  Consider repeat neuropsych testing next time.    8. GERD. Controlled with omeprazole.      Outpatient Medications Prior to Visit   Medication Sig Dispense Refill   • buPROPion XL (WELLBUTRIN XL) 300 MG 24 hr tablet Take 1 tablet by mouth Every Morning. 30 tablet 6   • citalopram (CeleXA) 40 MG tablet TAKE ONE TABLET BY MOUTH DAILY 30 tablet 3   • clonazePAM (KlonoPIN) 0.5 MG tablet TAKE TWO TABLETS BY MOUTH EVERY NIGHT AT BEDTIME AS NEEDED 60 tablet 2   • donepezil (ARICEPT) 10 MG tablet Take 1 tablet by mouth Every Night. 30 tablet 5   • DULoxetine (CYMBALTA) 20 MG capsule Take 60 mg by mouth.     • gabapentin (NEURONTIN) 400 MG capsule Take 400 mg by mouth every 4 (four) hours.     • Morphine (MS CONTIN) 60 MG 12 hr tablet Take 100 mg by mouth 2 (Two) Times a Day.     • Morphine (MSIR) 15 MG tablet Take 15 mg by mouth 2 (Two) Times a Day.     • omeprazole (priLOSEC) 20 MG capsule Take 20 mg by mouth.     • buPROPion XL (WELLBUTRIN XL) 150 MG 24 hr tablet TAKE ONE TABLET BY MOUTH DAILY 30 tablet 5   • memantine (NAMENDA) 5 MG tablet Take 2 tablets by mouth Daily. Start with one tablet daily X 1 week, then increase to two tablets daily. 60 tablet 5     No facility-administered medications prior to visit.      No orders of the defined types were placed in this encounter.    [unfilled]  Medications Discontinued During This Encounter   Medication Reason   • memantine (NAMENDA) 5 MG tablet *Therapy completed   • buPROPion XL (WELLBUTRIN XL) 150 MG 24 hr tablet        Return in about 3 months (around 9/4/2018).

## 2018-06-05 LAB
ALBUMIN SERPL-MCNC: 4.2 G/DL (ref 3.5–5.2)
ALBUMIN/GLOB SERPL: 1.6 G/DL
ALP SERPL-CCNC: 76 U/L (ref 40–129)
ALT SERPL-CCNC: 14 U/L (ref 5–33)
AST SERPL-CCNC: 25 U/L (ref 5–32)
BASOPHILS # BLD AUTO: 0.04 10*3/MM3 (ref 0–0.2)
BASOPHILS NFR BLD AUTO: 0.7 % (ref 0–2)
BILIRUB SERPL-MCNC: 0.2 MG/DL (ref 0.2–1.2)
BUN SERPL-MCNC: 27 MG/DL (ref 8–23)
BUN/CREAT SERPL: 16.4 (ref 7–25)
CALCIUM SERPL-MCNC: 9 MG/DL (ref 8.8–10.5)
CHLORIDE SERPL-SCNC: 105 MMOL/L (ref 98–107)
CO2 SERPL-SCNC: 25.8 MMOL/L (ref 22–29)
CREAT SERPL-MCNC: 1.65 MG/DL (ref 0.57–1)
EOSINOPHIL # BLD AUTO: 0.37 10*3/MM3 (ref 0.1–0.3)
EOSINOPHIL NFR BLD AUTO: 6.3 % (ref 0–4)
ERYTHROCYTE [DISTWIDTH] IN BLOOD BY AUTOMATED COUNT: 14 % (ref 11.5–14.5)
FERRITIN SERPL-MCNC: 38 NG/ML (ref 13–150)
FOLATE BLD-MCNC: 451.3 NG/ML
FOLATE RBC-MCNC: 1359 NG/ML
GFR SERPLBLD CREATININE-BSD FMLA CKD-EPI: 31 ML/MIN/1.73
GFR SERPLBLD CREATININE-BSD FMLA CKD-EPI: 37 ML/MIN/1.73
GLOBULIN SER CALC-MCNC: 2.6 GM/DL
GLUCOSE SERPL-MCNC: 94 MG/DL (ref 65–99)
HCT VFR BLD AUTO: 33.2 % (ref 37–47)
HGB BLD-MCNC: 10.4 G/DL (ref 12–16)
IMM GRANULOCYTES # BLD: 0.03 10*3/MM3 (ref 0–0.03)
IMM GRANULOCYTES NFR BLD: 0.5 % (ref 0–0.5)
LYMPHOCYTES # BLD AUTO: 1.15 10*3/MM3 (ref 0.6–4.8)
LYMPHOCYTES NFR BLD AUTO: 19.4 % (ref 20–45)
MCH RBC QN AUTO: 30.1 PG (ref 27–31)
MCHC RBC AUTO-ENTMCNC: 31.3 G/DL (ref 31–37)
MCV RBC AUTO: 96 FL (ref 81–99)
MONOCYTES # BLD AUTO: 0.41 10*3/MM3 (ref 0–1)
MONOCYTES NFR BLD AUTO: 6.9 % (ref 3–8)
NEUTROPHILS # BLD AUTO: 3.92 10*3/MM3 (ref 1.5–8.3)
NEUTROPHILS NFR BLD AUTO: 66.2 % (ref 45–70)
NRBC BLD AUTO-RTO: 0 /100 WBC (ref 0–0)
PLATELET # BLD AUTO: 226 10*3/MM3 (ref 140–500)
POTASSIUM SERPL-SCNC: 4.7 MMOL/L (ref 3.5–5.2)
PROT SERPL-MCNC: 6.8 G/DL (ref 6–8.5)
RBC # BLD AUTO: 3.46 10*6/MM3 (ref 4.2–5.4)
SODIUM SERPL-SCNC: 141 MMOL/L (ref 136–145)
TSH SERPL DL<=0.005 MIU/L-ACNC: 1.42 MIU/ML (ref 0.27–4.2)
VIT B12 SERPL-MCNC: 456 PG/ML
WBC # BLD AUTO: 5.92 10*3/MM3 (ref 4.8–10.8)

## 2018-06-06 ENCOUNTER — TELEPHONE (OUTPATIENT)
Dept: INTERNAL MEDICINE | Facility: CLINIC | Age: 73
End: 2018-06-06

## 2018-06-06 NOTE — TELEPHONE ENCOUNTER
Patient advised as per below and voiced understanding.    ----- Message from Niki Sebastian MA sent at 6/6/2018  8:59 AM EDT -----  Regarding: FW: QUESTION  Contact: [phone]      ----- Message -----  From: Sriram Blanton MD  Sent: 6/6/2018   8:50 AM  [facility]  Subject: RE: QUESTION                                     I e-prescribed Myrbetriq for her to try.  I think this would have fewest side effects, especially with her dementia, but we can change if too expensive.    ----- Message -----  From: Amanda Garcia MA  Sent: 6/5/2018  12:17 PM  To: Sriram Blanton MD  Subject: FW: QUESTION                                         ----- Message -----  From: Rhonda Hairston  Sent: 6/5/2018  11:57 AM  To: Johnathan Mittal [facility]  Subject: QUESTION                                         [facility]    Patient called to say that she remembered the question that she wanted to ask dr blanton yesterday when she was here. She said that she has been having bladder problems, she has to get up to urinate every 2 hours every night to urinate.  Can we send something in for her?    brenda travis    Thanks!  rhonda

## 2018-06-25 RX ORDER — CITALOPRAM 40 MG/1
TABLET ORAL
Qty: 30 TABLET | Refills: 5 | Status: SHIPPED | OUTPATIENT
Start: 2018-06-25 | End: 2019-01-17

## 2018-07-02 ENCOUNTER — OFFICE VISIT (OUTPATIENT)
Dept: INTERNAL MEDICINE | Facility: CLINIC | Age: 73
End: 2018-07-02

## 2018-07-02 VITALS
RESPIRATION RATE: 20 BRPM | SYSTOLIC BLOOD PRESSURE: 122 MMHG | TEMPERATURE: 98.9 F | BODY MASS INDEX: 31.75 KG/M2 | HEART RATE: 66 BPM | OXYGEN SATURATION: 97 % | WEIGHT: 190.8 LBS | DIASTOLIC BLOOD PRESSURE: 68 MMHG

## 2018-07-02 DIAGNOSIS — N32.81 OAB (OVERACTIVE BLADDER): Primary | ICD-10-CM

## 2018-07-02 DIAGNOSIS — R60.9 DEPENDENT EDEMA: ICD-10-CM

## 2018-07-02 PROCEDURE — 99213 OFFICE O/P EST LOW 20 MIN: CPT | Performed by: FAMILY MEDICINE

## 2018-07-02 NOTE — PROGRESS NOTES
Sandra Mane is a 73 y.o. female, who presents with a chief complaint of   Chief Complaint   Patient presents with   • Foot Swelling     Both, several months       HPI     1. Swelling of legs.  X several months.  Worse after sitting all day.  Better in the mornings or after elevating them.  Doesn't correspond to any new medications.    2. OAB.  Started Myrbetriq.  Tolerating it well.  Somewhat effective.      The following portions of the patient's history were reviewed and updated as appropriate: allergies, current medications, past family history, past medical history, past social history, past surgical history and problem list.    Allergies: Patient has no known allergies.    Review of Systems   Constitutional: Positive for fatigue.   Respiratory: Negative for cough and shortness of breath.    Cardiovascular: Positive for leg swelling. Negative for chest pain and palpitations.   Endocrine: Negative.    Genitourinary: Positive for frequency.   Musculoskeletal: Positive for back pain.   Hematological: Negative.    Psychiatric/Behavioral: Negative for sleep disturbance. The patient is nervous/anxious.              Wt Readings from Last 3 Encounters:   07/02/18 86.5 kg (190 lb 12.8 oz)   06/04/18 85.7 kg (189 lb)   02/26/18 87.5 kg (193 lb)     Temp Readings from Last 3 Encounters:   07/02/18 98.9 °F (37.2 °C)   06/04/18 98.9 °F (37.2 °C)   02/26/18 99.6 °F (37.6 °C)     BP Readings from Last 3 Encounters:   07/02/18 122/68   06/04/18 130/68   02/26/18 140/72     Pulse Readings from Last 3 Encounters:   07/02/18 66   06/04/18 68   02/26/18 72     Body mass index is 31.75 kg/m².  @LASTSAO2(3)@    Physical Exam   Constitutional: She is oriented to person, place, and time. She appears well-developed and well-nourished.   Sitting in wheelchair.   HENT:   Head: Normocephalic and atraumatic.   Right Ear: Tympanic membrane normal.   Left Ear: Tympanic membrane normal.   Eyes: Conjunctivae and EOM are normal.   Neck:  Neck supple.   Cardiovascular: Normal rate, regular rhythm and normal heart sounds.  Exam reveals no gallop and no friction rub.    No murmur heard.  Pulmonary/Chest: Effort normal and breath sounds normal. No respiratory distress. She has no wheezes. She has no rales.   Abdominal: Soft. Bowel sounds are normal. There is no hepatosplenomegaly.   Musculoskeletal: She exhibits edema (2+ to shin; pits somewhat).   Back not examined.   Lymphadenopathy:     She has no cervical adenopathy.   Neurological: She is alert and oriented to person, place, and time.   Skin: Skin is warm and dry.   Psychiatric: She has a normal mood and affect. Her behavior is normal.   Nursing note and vitals reviewed.      Results for orders placed or performed in visit on 06/04/18   Comprehensive Metabolic Panel   Result Value Ref Range    Glucose 94 65 - 99 mg/dL    BUN 27 (H) 8 - 23 mg/dL    Creatinine 1.65 (H) 0.57 - 1.00 mg/dL    eGFR Non African Am 31 (L) >60 mL/min/1.73    eGFR African Am 37 (L) >60 mL/min/1.73    BUN/Creatinine Ratio 16.4 7.0 - 25.0    Sodium 141 136 - 145 mmol/L    Potassium 4.7 3.5 - 5.2 mmol/L    Chloride 105 98 - 107 mmol/L    Total CO2 25.8 22.0 - 29.0 mmol/L    Calcium 9.0 8.8 - 10.5 mg/dL    Total Protein 6.8 6.0 - 8.5 g/dL    Albumin 4.20 3.50 - 5.20 g/dL    Globulin 2.6 gm/dL    A/G Ratio 1.6 g/dL    Total Bilirubin 0.2 0.2 - 1.2 mg/dL    Alkaline Phosphatase 76 40 - 129 U/L    AST (SGOT) 25 5 - 32 U/L    ALT (SGPT) 14 5 - 33 U/L   Ferritin   Result Value Ref Range    Ferritin 38.00 13.00 - 150.00 ng/mL   Vitamin B12   Result Value Ref Range    Vitamin B-12 456 232-1,245 pg/mL   TSH   Result Value Ref Range    TSH 1.420 0.270 - 4.200 mIU/mL   Folate RBC   Result Value Ref Range    Folate, Hemolysate 451.3 Not Estab. ng/mL    RBC Folate 1,359 >498 ng/mL   CBC & Differential   Result Value Ref Range    WBC 5.92 4.80 - 10.80 10*3/mm3    RBC 3.46 (L) 4.20 - 5.40 10*6/mm3    Hemoglobin 10.4 (L) 12.0 - 16.0 g/dL     Hematocrit 33.2 (L) 37.0 - 47.0 %    MCV 96.0 81.0 - 99.0 fL    MCH 30.1 27.0 - 31.0 pg    MCHC 31.3 31.0 - 37.0 g/dL    RDW 14.0 11.5 - 14.5 %    Platelets 226 140 - 500 10*3/mm3    Neutrophil Rel % 66.2 45.0 - 70.0 %    Lymphocyte Rel % 19.4 (L) 20.0 - 45.0 %    Monocyte Rel % 6.9 3.0 - 8.0 %    Eosinophil Rel % 6.3 (H) 0.0 - 4.0 %    Basophil Rel % 0.7 0.0 - 2.0 %    Neutrophils Absolute 3.92 1.50 - 8.30 10*3/mm3    Lymphocytes Absolute 1.15 0.60 - 4.80 10*3/mm3    Monocytes Absolute 0.41 0.00 - 1.00 10*3/mm3    Eosinophils Absolute 0.37 (H) 0.10 - 0.30 10*3/mm3    Basophils Absolute 0.04 0.00 - 0.20 10*3/mm3    Immature Granulocyte Rel % 0.5 0.0 - 0.5 %    Immature Grans Absolute 0.03 0.00 - 0.03 10*3/mm3    nRBC 0.0 0.0 - 0.0 /100 WBC           Sandra was seen today for foot swelling.    Diagnoses and all orders for this visit:    OAB (overactive bladder)  -     Mirabegron ER (MYRBETRIQ) 50 MG tablet sustained-release 24 hour 24 hr tablet; Take 50 mg by mouth Daily.    Dependent edema    1. OAB.  Increase Myrbetriq to 50 mg daily.    2. Dependent edema.  Options discussed.  Pt will try compression stockings, elevation.  F/U next month as scheduled.      Outpatient Medications Prior to Visit   Medication Sig Dispense Refill   • buPROPion XL (WELLBUTRIN XL) 300 MG 24 hr tablet Take 1 tablet by mouth Every Morning. 30 tablet 6   • citalopram (CeleXA) 40 MG tablet TAKE ONE TABLET BY MOUTH DAILY 30 tablet 5   • clonazePAM (KlonoPIN) 0.5 MG tablet TAKE TWO TABLETS BY MOUTH EVERY NIGHT AT BEDTIME AS NEEDED 60 tablet 2   • gabapentin (NEURONTIN) 400 MG capsule Take 400 mg by mouth every 4 (four) hours.     • Morphine (MS CONTIN) 60 MG 12 hr tablet Take 100 mg by mouth 2 (Two) Times a Day.     • Morphine (MSIR) 15 MG tablet Take 15 mg by mouth 2 (Two) Times a Day.     • omeprazole (priLOSEC) 20 MG capsule Take 20 mg by mouth.     • donepezil (ARICEPT) 10 MG tablet Take 1 tablet by mouth Every Night. 30 tablet 5   •  Mirabegron ER (MYRBETRIQ) 25 MG tablet sustained-release 24 hour 24 hr tablet Take 1 tablet by mouth Daily. 30 tablet 5   • DULoxetine (CYMBALTA) 20 MG capsule Take 60 mg by mouth.       No facility-administered medications prior to visit.      New Medications Ordered This Visit   Medications   • Mirabegron ER (MYRBETRIQ) 50 MG tablet sustained-release 24 hour 24 hr tablet     Sig: Take 50 mg by mouth Daily.     Dispense:  30 tablet     Refill:  5     [unfilled]  Medications Discontinued During This Encounter   Medication Reason   • donepezil (ARICEPT) 10 MG tablet *Therapy completed   • DULoxetine (CYMBALTA) 20 MG capsule *Therapy completed   • Mirabegron ER (MYRBETRIQ) 25 MG tablet sustained-release 24 hour 24 hr tablet Reorder         Return for Next scheduled follow up.

## 2018-08-20 ENCOUNTER — LAB (OUTPATIENT)
Dept: INTERNAL MEDICINE | Facility: CLINIC | Age: 73
End: 2018-08-20

## 2018-08-20 DIAGNOSIS — F02.80 LATE ONSET ALZHEIMER'S DISEASE WITHOUT BEHAVIORAL DISTURBANCE (HCC): ICD-10-CM

## 2018-08-20 DIAGNOSIS — N18.30 CKD (CHRONIC KIDNEY DISEASE) STAGE 3, GFR 30-59 ML/MIN (HCC): ICD-10-CM

## 2018-08-20 DIAGNOSIS — G30.1 LATE ONSET ALZHEIMER'S DISEASE WITHOUT BEHAVIORAL DISTURBANCE (HCC): ICD-10-CM

## 2018-08-20 DIAGNOSIS — M19.90 ARTHRITIS: ICD-10-CM

## 2018-08-20 DIAGNOSIS — E78.5 HYPERLIPIDEMIA, UNSPECIFIED HYPERLIPIDEMIA TYPE: ICD-10-CM

## 2018-08-20 DIAGNOSIS — E78.5 HYPERLIPIDEMIA, UNSPECIFIED HYPERLIPIDEMIA TYPE: Primary | ICD-10-CM

## 2018-08-20 LAB
ALBUMIN SERPL-MCNC: 4.2 G/DL (ref 3.5–5.2)
ALBUMIN/GLOB SERPL: 1.5 G/DL
ALP SERPL-CCNC: 75 U/L (ref 40–129)
ALT SERPL-CCNC: 15 U/L (ref 5–33)
AST SERPL-CCNC: 25 U/L (ref 5–32)
BASOPHILS # BLD AUTO: 0.03 10*3/MM3 (ref 0–0.2)
BASOPHILS NFR BLD AUTO: 0.4 % (ref 0–2)
BILIRUB SERPL-MCNC: 0.3 MG/DL (ref 0.2–1.2)
BUN SERPL-MCNC: 15 MG/DL (ref 8–23)
BUN/CREAT SERPL: 10 (ref 7–25)
CALCIUM SERPL-MCNC: 8.9 MG/DL (ref 8.8–10.5)
CHLORIDE SERPL-SCNC: 101 MMOL/L (ref 98–107)
CO2 SERPL-SCNC: 26.8 MMOL/L (ref 22–29)
CREAT SERPL-MCNC: 1.5 MG/DL (ref 0.57–1)
EOSINOPHIL # BLD AUTO: 0.46 10*3/MM3 (ref 0.1–0.3)
EOSINOPHIL NFR BLD AUTO: 6.8 % (ref 0–4)
ERYTHROCYTE [DISTWIDTH] IN BLOOD BY AUTOMATED COUNT: 13.8 % (ref 11.5–14.5)
GLOBULIN SER CALC-MCNC: 2.8 GM/DL
GLUCOSE SERPL-MCNC: 94 MG/DL (ref 65–99)
HCT VFR BLD AUTO: 33 % (ref 37–47)
HGB BLD-MCNC: 10.1 G/DL (ref 12–16)
IMM GRANULOCYTES # BLD: 0.02 10*3/MM3 (ref 0–0.03)
IMM GRANULOCYTES NFR BLD: 0.3 % (ref 0–0.5)
LYMPHOCYTES # BLD AUTO: 0.89 10*3/MM3 (ref 0.6–4.8)
LYMPHOCYTES NFR BLD AUTO: 13.2 % (ref 20–45)
MCH RBC QN AUTO: 29.5 PG (ref 27–31)
MCHC RBC AUTO-ENTMCNC: 30.6 G/DL (ref 31–37)
MCV RBC AUTO: 96.5 FL (ref 81–99)
MONOCYTES # BLD AUTO: 0.55 10*3/MM3 (ref 0–1)
MONOCYTES NFR BLD AUTO: 8.2 % (ref 3–8)
NEUTROPHILS # BLD AUTO: 4.77 10*3/MM3 (ref 1.5–8.3)
NEUTROPHILS NFR BLD AUTO: 71.1 % (ref 45–70)
NRBC BLD AUTO-RTO: 0 /100 WBC (ref 0–0)
PLATELET # BLD AUTO: 229 10*3/MM3 (ref 140–500)
POTASSIUM SERPL-SCNC: 4.7 MMOL/L (ref 3.5–5.2)
PROT SERPL-MCNC: 7 G/DL (ref 6–8.5)
RBC # BLD AUTO: 3.42 10*6/MM3 (ref 4.2–5.4)
SODIUM SERPL-SCNC: 140 MMOL/L (ref 136–145)
WBC # BLD AUTO: 6.72 10*3/MM3 (ref 4.8–10.8)

## 2018-08-27 ENCOUNTER — OFFICE VISIT (OUTPATIENT)
Dept: INTERNAL MEDICINE | Facility: CLINIC | Age: 73
End: 2018-08-27

## 2018-08-27 VITALS
HEART RATE: 67 BPM | SYSTOLIC BLOOD PRESSURE: 104 MMHG | WEIGHT: 182 LBS | RESPIRATION RATE: 16 BRPM | BODY MASS INDEX: 30.32 KG/M2 | OXYGEN SATURATION: 95 % | HEIGHT: 65 IN | DIASTOLIC BLOOD PRESSURE: 68 MMHG | TEMPERATURE: 99 F

## 2018-08-27 DIAGNOSIS — G89.29 CHRONIC MIDLINE LOW BACK PAIN, WITH SCIATICA PRESENCE UNSPECIFIED: ICD-10-CM

## 2018-08-27 DIAGNOSIS — M54.5 CHRONIC MIDLINE LOW BACK PAIN, WITH SCIATICA PRESENCE UNSPECIFIED: ICD-10-CM

## 2018-08-27 DIAGNOSIS — E78.5 HYPERLIPIDEMIA, UNSPECIFIED HYPERLIPIDEMIA TYPE: Primary | ICD-10-CM

## 2018-08-27 DIAGNOSIS — K21.9 GASTROESOPHAGEAL REFLUX DISEASE WITHOUT ESOPHAGITIS: ICD-10-CM

## 2018-08-27 DIAGNOSIS — D64.9 ANEMIA, UNSPECIFIED TYPE: ICD-10-CM

## 2018-08-27 DIAGNOSIS — N18.30 CKD (CHRONIC KIDNEY DISEASE) STAGE 3, GFR 30-59 ML/MIN (HCC): ICD-10-CM

## 2018-08-27 DIAGNOSIS — F02.80 LATE ONSET ALZHEIMER'S DISEASE WITHOUT BEHAVIORAL DISTURBANCE (HCC): ICD-10-CM

## 2018-08-27 DIAGNOSIS — R60.9 DEPENDENT EDEMA: ICD-10-CM

## 2018-08-27 DIAGNOSIS — F41.8 DEPRESSION WITH ANXIETY: ICD-10-CM

## 2018-08-27 DIAGNOSIS — G30.1 LATE ONSET ALZHEIMER'S DISEASE WITHOUT BEHAVIORAL DISTURBANCE (HCC): ICD-10-CM

## 2018-08-27 PROCEDURE — 99214 OFFICE O/P EST MOD 30 MIN: CPT | Performed by: FAMILY MEDICINE

## 2018-08-27 RX ORDER — FUROSEMIDE 20 MG/1
20 TABLET ORAL DAILY
Qty: 30 TABLET | Refills: 3 | Status: SHIPPED | OUTPATIENT
Start: 2018-08-27 | End: 2019-02-16 | Stop reason: SDUPTHER

## 2018-08-27 RX ORDER — POTASSIUM CHLORIDE 750 MG/1
10 TABLET, FILM COATED, EXTENDED RELEASE ORAL ONCE
Qty: 30 TABLET | Refills: 3 | Status: SHIPPED | OUTPATIENT
Start: 2018-08-27 | End: 2018-08-27

## 2018-08-27 RX ORDER — FLUTICASONE PROPIONATE 50 MCG
2 SPRAY, SUSPENSION (ML) NASAL DAILY
COMMUNITY

## 2018-08-27 RX ORDER — MEMANTINE HYDROCHLORIDE 5 MG/1
10 TABLET ORAL DAILY
COMMUNITY
End: 2018-12-13 | Stop reason: SDUPTHER

## 2018-08-27 NOTE — PROGRESS NOTES
Subjective     Sandra Mane is a 73 y.o. female, who presents with a chief complaint of   Chief Complaint   Patient presents with   • Hyperlipidemia   • Anemia   • Chronic Kidney Disease   • Leg Swelling     bilateral pain     Hyperlipidemia     Cough     Anemia     Chronic Kidney Disease     Leg Swelling     1. Dementia.  Pt tolerating the donepezil.  Started memantine last time per Dr. Dr. Cole's recommendations and she discontinued it due to side effects.    2. Depression with anxiety.  Increased bupropion dose last visit and pt reports she is better controlled.  Denies SI.    3. Chronic back pain.  Pt feels that her legs are a little weaker.  She sees Dr. Rea.      The following portions of the patient's history were reviewed and updated as appropriate: allergies, current medications, past family history, past medical history, past social history, past surgical history and problem list.    Allergies: Patient has no known allergies.    Review of Systems   Constitutional: Negative.    HENT: Negative.    Eyes: Negative.    Respiratory: Negative.    Cardiovascular: Positive for leg swelling.   Gastrointestinal: Negative.    Endocrine: Negative.    Genitourinary: Negative.    Musculoskeletal: Positive for back pain.   Skin: Negative.    Allergic/Immunologic: Negative.    Neurological: Negative.    Hematological: Negative.    Psychiatric/Behavioral: The patient is nervous/anxious.      Objective     Wt Readings from Last 3 Encounters:   08/27/18 82.6 kg (182 lb)   07/02/18 86.5 kg (190 lb 12.8 oz)   06/04/18 85.7 kg (189 lb)     Temp Readings from Last 3 Encounters:   08/27/18 99 °F (37.2 °C) (Oral)   07/02/18 98.9 °F (37.2 °C)   06/04/18 98.9 °F (37.2 °C)     BP Readings from Last 3 Encounters:   08/27/18 104/68   07/02/18 122/68   06/04/18 130/68     Pulse Readings from Last 3 Encounters:   08/27/18 67   07/02/18 66   06/04/18 68     Body mass index is 30.29 kg/m².  SpO2 Readings from Last 3 Encounters:    02/26/18 96%   12/04/17 97%   10/10/17 95%       Physical Exam   Constitutional: She is oriented to person, place, and time. She appears well-developed and well-nourished.   Sitting in wheelchair.   HENT:   Head: Normocephalic and atraumatic.   Right Ear: External ear normal.   Left Ear: External ear normal.   Nose: Nose normal.   Mouth/Throat: Oropharynx is clear and moist.   Eyes: Conjunctivae and EOM are normal.   Neck: Neck supple.   Cardiovascular: Normal rate, regular rhythm and normal heart sounds.  Exam reveals no gallop and no friction rub.    No murmur heard.  Pulmonary/Chest: Effort normal and breath sounds normal. No respiratory distress. She has no wheezes. She has no rales.   Abdominal: Soft. Bowel sounds are normal. There is no hepatosplenomegaly.   Musculoskeletal: She exhibits edema.   Back not examined.   Lymphadenopathy:     She has no cervical adenopathy.   Neurological: She is alert and oriented to person, place, and time.   Skin: Skin is warm and dry.   Psychiatric: She has a normal mood and affect. Her behavior is normal.   Nursing note and vitals reviewed.    Results for orders placed or performed in visit on 08/20/18   Comprehensive Metabolic Panel   Result Value Ref Range    Glucose 94 65 - 99 mg/dL    BUN 15 8 - 23 mg/dL    Creatinine 1.50 (H) 0.57 - 1.00 mg/dL    eGFR Non African Am 34 (L) >60 mL/min/1.73    eGFR  Am 41 (L) >60 mL/min/1.73    BUN/Creatinine Ratio 10.0 7.0 - 25.0    Sodium 140 136 - 145 mmol/L    Potassium 4.7 3.5 - 5.2 mmol/L    Chloride 101 98 - 107 mmol/L    Total CO2 26.8 22.0 - 29.0 mmol/L    Calcium 8.9 8.8 - 10.5 mg/dL    Total Protein 7.0 6.0 - 8.5 g/dL    Albumin 4.20 3.50 - 5.20 g/dL    Globulin 2.8 gm/dL    A/G Ratio 1.5 g/dL    Total Bilirubin 0.3 0.2 - 1.2 mg/dL    Alkaline Phosphatase 75 40 - 129 U/L    AST (SGOT) 25 5 - 32 U/L    ALT (SGPT) 15 5 - 33 U/L   CBC w AUTO Differential   Result Value Ref Range    WBC 6.72 4.80 - 10.80 10*3/mm3    RBC 3.42  (L) 4.20 - 5.40 10*6/mm3    Hemoglobin 10.1 (L) 12.0 - 16.0 g/dL    Hematocrit 33.0 (L) 37.0 - 47.0 %    MCV 96.5 81.0 - 99.0 fL    MCH 29.5 27.0 - 31.0 pg    MCHC 30.6 (L) 31.0 - 37.0 g/dL    RDW 13.8 11.5 - 14.5 %    Platelets 229 140 - 500 10*3/mm3    Neutrophil Rel % 71.1 (H) 45.0 - 70.0 %    Lymphocyte Rel % 13.2 (L) 20.0 - 45.0 %    Monocyte Rel % 8.2 (H) 3.0 - 8.0 %    Eosinophil Rel % 6.8 (H) 0.0 - 4.0 %    Basophil Rel % 0.4 0.0 - 2.0 %    Neutrophils Absolute 4.77 1.50 - 8.30 10*3/mm3    Lymphocytes Absolute 0.89 0.60 - 4.80 10*3/mm3    Monocytes Absolute 0.55 0.00 - 1.00 10*3/mm3    Eosinophils Absolute 0.46 (H) 0.10 - 0.30 10*3/mm3    Basophils Absolute 0.03 0.00 - 0.20 10*3/mm3    Immature Granulocyte Rel % 0.3 0.0 - 0.5 %    Immature Grans Absolute 0.02 0.00 - 0.03 10*3/mm3    nRBC 0.0 0.0 - 0.0 /100 WBC       Assessment/Plan   Sandra was seen today for hyperlipidemia, anemia, chronic kidney disease and leg swelling.    Diagnoses and all orders for this visit:    Hyperlipidemia, unspecified hyperlipidemia type  -     Lipid Panel With / Chol / HDL Ratio; Future    CKD (chronic kidney disease) stage 3, GFR 30-59 ml/min  -     Comprehensive Metabolic Panel; Future    Depression with anxiety  -     TSH; Future    Chronic midline low back pain, with sciatica presence unspecified    Anemia, unspecified type  -     CBC & Differential; Future    Late onset Alzheimer's disease without behavioral disturbance    Gastroesophageal reflux disease without esophagitis    Dependent edema    1. Hyperlipidemia.  Mild.  Controlled with lifestyle measures.    2. CKDIII.  Stable.  Avoid NSAIDs.    3. Depression with anxiety. Controlled with citalopram and bupropion XL.    4. Chronic back pain.  Stable. Per pain management. They are considering a pain pump.    5. Anemia.  Chronic.  Stable.  Ferritin, B12, folate normal.     6. Alzheimer's.  She has had neuropsych testing and neurology consult.  Dr. Cole's note and  recommendations were reviewed.  Continue memantine. Consider repeat neuropsych testing next time; she wants to hold off on this right now.    8. GERD. Controlled with omeprazole.    9. Dependent edema.  Start furosemide 20 mg daily with KCl.  Compression stockings.    10. Routine health maint.  They received the Cologuard in the mail last week.  Mammogram 5/2019.  Flu vaccine today.      Outpatient Medications Prior to Visit   Medication Sig Dispense Refill   • buPROPion XL (WELLBUTRIN XL) 300 MG 24 hr tablet Take 1 tablet by mouth Every Morning. 30 tablet 6   • citalopram (CeleXA) 40 MG tablet TAKE ONE TABLET BY MOUTH DAILY 30 tablet 5   • clonazePAM (KlonoPIN) 0.5 MG tablet TAKE TWO TABLETS BY MOUTH EVERY NIGHT AT BEDTIME AS NEEDED 60 tablet 2   • gabapentin (NEURONTIN) 400 MG capsule Take 400 mg by mouth every 4 (four) hours.     • Morphine (MS CONTIN) 60 MG 12 hr tablet Take 100 mg by mouth 2 (Two) Times a Day.     • Morphine (MSIR) 15 MG tablet Take 15 mg by mouth 2 (Two) Times a Day.     • omeprazole (priLOSEC) 20 MG capsule Take 20 mg by mouth.     • Mirabegron ER (MYRBETRIQ) 50 MG tablet sustained-release 24 hour 24 hr tablet Take 50 mg by mouth Daily. 30 tablet 5     No facility-administered medications prior to visit.      No orders of the defined types were placed in this encounter.    [unfilled]  Medications Discontinued During This Encounter   Medication Reason   • Mirabegron ER (MYRBETRIQ) 50 MG tablet sustained-release 24 hour 24 hr tablet        Return in about 3 months (around 11/27/2018).

## 2018-08-30 DIAGNOSIS — F41.9 ANXIETY: ICD-10-CM

## 2018-08-31 RX ORDER — CLONAZEPAM 0.5 MG/1
TABLET ORAL
Qty: 60 TABLET | Refills: 2 | Status: SHIPPED | OUTPATIENT
Start: 2018-08-31 | End: 2018-11-29 | Stop reason: SDUPTHER

## 2018-09-27 DIAGNOSIS — F41.8 DEPRESSION WITH ANXIETY: ICD-10-CM

## 2018-09-27 RX ORDER — BUPROPION HYDROCHLORIDE 300 MG/1
TABLET ORAL
Qty: 30 TABLET | Refills: 5 | Status: SHIPPED | OUTPATIENT
Start: 2018-09-27 | End: 2019-01-17

## 2018-11-14 ENCOUNTER — OFFICE VISIT (OUTPATIENT)
Dept: INTERNAL MEDICINE | Facility: CLINIC | Age: 73
End: 2018-11-14

## 2018-11-14 VITALS
BODY MASS INDEX: 33.29 KG/M2 | WEIGHT: 199.8 LBS | HEIGHT: 65 IN | RESPIRATION RATE: 16 BRPM | TEMPERATURE: 98.7 F | HEART RATE: 67 BPM | OXYGEN SATURATION: 98 % | DIASTOLIC BLOOD PRESSURE: 60 MMHG | SYSTOLIC BLOOD PRESSURE: 120 MMHG

## 2018-11-14 DIAGNOSIS — Z23 NEED FOR INFLUENZA VACCINATION: Primary | ICD-10-CM

## 2018-11-14 DIAGNOSIS — N30.01 ACUTE CYSTITIS WITH HEMATURIA: ICD-10-CM

## 2018-11-14 LAB
BILIRUB BLD-MCNC: NEGATIVE MG/DL
CLARITY, POC: CLEAR
COLOR UR: YELLOW
GLUCOSE UR STRIP-MCNC: NEGATIVE MG/DL
KETONES UR QL: NEGATIVE
LEUKOCYTE EST, POC: ABNORMAL
NITRITE UR-MCNC: NEGATIVE MG/ML
PH UR: 6 [PH] (ref 5–8)
PROT UR STRIP-MCNC: NEGATIVE MG/DL
RBC # UR STRIP: ABNORMAL /UL
SP GR UR: 1.01 (ref 1–1.03)
UROBILINOGEN UR QL: NORMAL

## 2018-11-14 PROCEDURE — 99213 OFFICE O/P EST LOW 20 MIN: CPT | Performed by: FAMILY MEDICINE

## 2018-11-14 PROCEDURE — 81003 URINALYSIS AUTO W/O SCOPE: CPT | Performed by: FAMILY MEDICINE

## 2018-11-14 PROCEDURE — 90662 IIV NO PRSV INCREASED AG IM: CPT | Performed by: FAMILY MEDICINE

## 2018-11-14 PROCEDURE — G0008 ADMIN INFLUENZA VIRUS VAC: HCPCS | Performed by: FAMILY MEDICINE

## 2018-11-14 RX ORDER — CIPROFLOXACIN 250 MG/1
250 TABLET, FILM COATED ORAL EVERY 12 HOURS SCHEDULED
Qty: 6 TABLET | Refills: 0 | Status: SHIPPED | OUTPATIENT
Start: 2018-11-14 | End: 2018-11-29

## 2018-11-14 NOTE — PROGRESS NOTES
Sandra Mane is a 73 y.o. female, who presents with a chief complaint of   Chief Complaint   Patient presents with   • Urinary Tract Infection     one week       HPI     Pt presents with one week of dysuria and she thinks she might have seen blood in the urine.  She is feeling somewhat better today.  Denies fevers.  She drank cranberry juice and a lot of water yesterday.  Denies flank pain.    The following portions of the patient's history were reviewed and updated as appropriate: allergies, current medications, past family history, past medical history, past social history, past surgical history and problem list.    Allergies: Patient has no known allergies.    Review of Systems   Constitutional: Negative for fever.   Cardiovascular: Negative.    Gastrointestinal: Negative.    Genitourinary: Positive for dysuria and frequency. Negative for flank pain.   Musculoskeletal: Positive for back pain.             Wt Readings from Last 3 Encounters:   11/14/18 90.6 kg (199 lb 12.8 oz)   08/27/18 82.6 kg (182 lb)   07/02/18 86.5 kg (190 lb 12.8 oz)     Temp Readings from Last 3 Encounters:   11/14/18 98.7 °F (37.1 °C)   08/27/18 99 °F (37.2 °C) (Oral)   07/02/18 98.9 °F (37.2 °C)     BP Readings from Last 3 Encounters:   11/14/18 120/60   08/27/18 104/68   07/02/18 122/68     Pulse Readings from Last 3 Encounters:   11/14/18 67   08/27/18 67   07/02/18 66     Body mass index is 33.25 kg/m².  @LASTSAO2(3)@    Physical Exam   Constitutional: She is oriented to person, place, and time. She appears well-developed and well-nourished. No distress.   HENT:   Head: Normocephalic and atraumatic.   Eyes: Conjunctivae and EOM are normal.   Neck: Neck supple. No thyromegaly present.   Pulmonary/Chest: Effort normal. No respiratory distress.   Abdominal: Soft. There is no tenderness.   Neurological: She is alert and oriented to person, place, and time.   Skin: Skin is warm and dry.   Nursing note and vitals reviewed.      Results  for orders placed or performed in visit on 08/20/18   Comprehensive Metabolic Panel   Result Value Ref Range    Glucose 94 65 - 99 mg/dL    BUN 15 8 - 23 mg/dL    Creatinine 1.50 (H) 0.57 - 1.00 mg/dL    eGFR Non African Am 34 (L) >60 mL/min/1.73    eGFR  Am 41 (L) >60 mL/min/1.73    BUN/Creatinine Ratio 10.0 7.0 - 25.0    Sodium 140 136 - 145 mmol/L    Potassium 4.7 3.5 - 5.2 mmol/L    Chloride 101 98 - 107 mmol/L    Total CO2 26.8 22.0 - 29.0 mmol/L    Calcium 8.9 8.8 - 10.5 mg/dL    Total Protein 7.0 6.0 - 8.5 g/dL    Albumin 4.20 3.50 - 5.20 g/dL    Globulin 2.8 gm/dL    A/G Ratio 1.5 g/dL    Total Bilirubin 0.3 0.2 - 1.2 mg/dL    Alkaline Phosphatase 75 40 - 129 U/L    AST (SGOT) 25 5 - 32 U/L    ALT (SGPT) 15 5 - 33 U/L   CBC w AUTO Differential   Result Value Ref Range    WBC 6.72 4.80 - 10.80 10*3/mm3    RBC 3.42 (L) 4.20 - 5.40 10*6/mm3    Hemoglobin 10.1 (L) 12.0 - 16.0 g/dL    Hematocrit 33.0 (L) 37.0 - 47.0 %    MCV 96.5 81.0 - 99.0 fL    MCH 29.5 27.0 - 31.0 pg    MCHC 30.6 (L) 31.0 - 37.0 g/dL    RDW 13.8 11.5 - 14.5 %    Platelets 229 140 - 500 10*3/mm3    Neutrophil Rel % 71.1 (H) 45.0 - 70.0 %    Lymphocyte Rel % 13.2 (L) 20.0 - 45.0 %    Monocyte Rel % 8.2 (H) 3.0 - 8.0 %    Eosinophil Rel % 6.8 (H) 0.0 - 4.0 %    Basophil Rel % 0.4 0.0 - 2.0 %    Neutrophils Absolute 4.77 1.50 - 8.30 10*3/mm3    Lymphocytes Absolute 0.89 0.60 - 4.80 10*3/mm3    Monocytes Absolute 0.55 0.00 - 1.00 10*3/mm3    Eosinophils Absolute 0.46 (H) 0.10 - 0.30 10*3/mm3    Basophils Absolute 0.03 0.00 - 0.20 10*3/mm3    Immature Granulocyte Rel % 0.3 0.0 - 0.5 %    Immature Grans Absolute 0.02 0.00 - 0.03 10*3/mm3    nRBC 0.0 0.0 - 0.0 /100 WBC           Sandra was seen today for urinary tract infection.    Diagnoses and all orders for this visit:    Need for influenza vaccination  -     Flu Vaccine High Dose PF 65YR+ ([id number])    Acute cystitis with hematuria  -     ciprofloxacin (CIPRO) 250 MG tablet; Take 1 tablet  by mouth Every 12 (Twelve) Hours.  -     Urine Culture - Urine, Urine, Clean Catch; Future      She was not able to collect a urine sample, so will collect this at home before starting the antibiotics.    Outpatient Medications Prior to Visit   Medication Sig Dispense Refill   • buPROPion XL (WELLBUTRIN XL) 300 MG 24 hr tablet TAKE ONE TABLET BY MOUTH EVERY MORNING 30 tablet 5   • citalopram (CeleXA) 40 MG tablet TAKE ONE TABLET BY MOUTH DAILY 30 tablet 5   • clonazePAM (KlonoPIN) 0.5 MG tablet TAKE TWO TABLETS BY MOUTH EVERY NIGHT AT BEDTIME AS NEEDED 60 tablet 2   • fluticasone (FLONASE) 50 MCG/ACT nasal spray 2 sprays into the nostril(s) as directed by provider Daily.     • furosemide (LASIX) 20 MG tablet Take 1 tablet by mouth Daily. 30 tablet 3   • gabapentin (NEURONTIN) 400 MG capsule Take 400 mg by mouth every 4 (four) hours.     • memantine (NAMENDA) 5 MG tablet Take 10 mg by mouth Daily.     • Morphine (MS CONTIN) 60 MG 12 hr tablet Take 100 mg by mouth 2 (Two) Times a Day.     • Morphine (MSIR) 15 MG tablet Take 15 mg by mouth 2 (Two) Times a Day.     • omeprazole (priLOSEC) 20 MG capsule Take 20 mg by mouth.       No facility-administered medications prior to visit.      New Medications Ordered This Visit   Medications   • ciprofloxacin (CIPRO) 250 MG tablet     Sig: Take 1 tablet by mouth Every 12 (Twelve) Hours.     Dispense:  6 tablet     Refill:  0     [unfilled]  There are no discontinued medications.      Return if symptoms worsen or fail to improve, for Next scheduled follow up.

## 2018-11-16 LAB
BACTERIA UR CULT: NORMAL
BACTERIA UR CULT: NORMAL

## 2018-11-19 ENCOUNTER — LAB (OUTPATIENT)
Dept: INTERNAL MEDICINE | Facility: CLINIC | Age: 73
End: 2018-11-19

## 2018-11-19 DIAGNOSIS — E78.5 HYPERLIPIDEMIA, UNSPECIFIED HYPERLIPIDEMIA TYPE: ICD-10-CM

## 2018-11-19 DIAGNOSIS — D64.9 ANEMIA, UNSPECIFIED TYPE: ICD-10-CM

## 2018-11-19 DIAGNOSIS — F41.8 DEPRESSION WITH ANXIETY: ICD-10-CM

## 2018-11-19 DIAGNOSIS — N18.30 CKD (CHRONIC KIDNEY DISEASE) STAGE 3, GFR 30-59 ML/MIN (HCC): ICD-10-CM

## 2018-11-19 LAB
ALBUMIN SERPL-MCNC: 3.7 G/DL (ref 3.5–5.2)
ALBUMIN/GLOB SERPL: 0.8 G/DL
ALP SERPL-CCNC: 84 U/L (ref 40–129)
ALT SERPL-CCNC: 10 U/L (ref 5–33)
AST SERPL-CCNC: 19 U/L (ref 5–32)
BASOPHILS # BLD AUTO: 0.03 10*3/MM3 (ref 0–0.2)
BASOPHILS NFR BLD AUTO: 0.5 % (ref 0–2)
BILIRUB SERPL-MCNC: 0.2 MG/DL (ref 0.2–1.2)
BUN SERPL-MCNC: 15 MG/DL (ref 8–23)
BUN/CREAT SERPL: 11.9 (ref 7–25)
CALCIUM SERPL-MCNC: 9.3 MG/DL (ref 8.8–10.5)
CHLORIDE SERPL-SCNC: 110 MMOL/L (ref 98–107)
CHOLEST SERPL-MCNC: 156 MG/DL (ref 0–200)
CHOLEST/HDLC SERPL: 3.32 {RATIO}
CO2 SERPL-SCNC: 27.8 MMOL/L (ref 22–29)
CREAT SERPL-MCNC: 1.26 MG/DL (ref 0.57–1)
EOSINOPHIL # BLD AUTO: 0.73 10*3/MM3 (ref 0.1–0.3)
EOSINOPHIL NFR BLD AUTO: 11.6 % (ref 0–4)
ERYTHROCYTE [DISTWIDTH] IN BLOOD BY AUTOMATED COUNT: 14.6 % (ref 11.5–14.5)
GLOBULIN SER CALC-MCNC: 4.9 GM/DL
GLUCOSE SERPL-MCNC: 95 MG/DL (ref 65–99)
HCT VFR BLD AUTO: 34.7 % (ref 37–47)
HDLC SERPL-MCNC: 47 MG/DL (ref 40–60)
HGB BLD-MCNC: 10.5 G/DL (ref 12–16)
IMM GRANULOCYTES # BLD: 0.02 10*3/MM3 (ref 0–0.03)
IMM GRANULOCYTES NFR BLD: 0.3 % (ref 0–0.5)
LDLC SERPL CALC-MCNC: 83 MG/DL (ref 0–100)
LYMPHOCYTES # BLD AUTO: 1.67 10*3/MM3 (ref 0.6–4.8)
LYMPHOCYTES NFR BLD AUTO: 26.4 % (ref 20–45)
MCH RBC QN AUTO: 28.8 PG (ref 27–31)
MCHC RBC AUTO-ENTMCNC: 30.3 G/DL (ref 31–37)
MCV RBC AUTO: 95.1 FL (ref 81–99)
MONOCYTES # BLD AUTO: 0.52 10*3/MM3 (ref 0–1)
MONOCYTES NFR BLD AUTO: 8.2 % (ref 3–8)
NEUTROPHILS # BLD AUTO: 3.35 10*3/MM3 (ref 1.5–8.3)
NEUTROPHILS NFR BLD AUTO: 53 % (ref 45–70)
NRBC BLD AUTO-RTO: 0 /100 WBC (ref 0–0)
PLATELET # BLD AUTO: 219 10*3/MM3 (ref 140–500)
POTASSIUM SERPL-SCNC: 5.3 MMOL/L (ref 3.5–5.2)
PROT SERPL-MCNC: 8.6 G/DL (ref 6–8.5)
RBC # BLD AUTO: 3.65 10*6/MM3 (ref 4.2–5.4)
SODIUM SERPL-SCNC: 148 MMOL/L (ref 136–145)
TRIGL SERPL-MCNC: 129 MG/DL (ref 0–150)
TSH SERPL DL<=0.005 MIU/L-ACNC: 5.19 MIU/ML (ref 0.27–4.2)
VLDLC SERPL CALC-MCNC: 25.8 MG/DL (ref 7–27)
WBC # BLD AUTO: 6.32 10*3/MM3 (ref 4.8–10.8)

## 2018-11-29 ENCOUNTER — OFFICE VISIT (OUTPATIENT)
Dept: INTERNAL MEDICINE | Facility: CLINIC | Age: 73
End: 2018-11-29

## 2018-11-29 VITALS
HEART RATE: 69 BPM | WEIGHT: 208 LBS | RESPIRATION RATE: 16 BRPM | HEIGHT: 65 IN | TEMPERATURE: 98.2 F | OXYGEN SATURATION: 96 % | BODY MASS INDEX: 34.66 KG/M2 | DIASTOLIC BLOOD PRESSURE: 84 MMHG | SYSTOLIC BLOOD PRESSURE: 130 MMHG

## 2018-11-29 DIAGNOSIS — G30.1 LATE ONSET ALZHEIMER'S DISEASE WITHOUT BEHAVIORAL DISTURBANCE (HCC): ICD-10-CM

## 2018-11-29 DIAGNOSIS — E78.5 HYPERLIPIDEMIA, UNSPECIFIED HYPERLIPIDEMIA TYPE: Primary | ICD-10-CM

## 2018-11-29 DIAGNOSIS — F41.9 ANXIETY: ICD-10-CM

## 2018-11-29 DIAGNOSIS — F02.80 LATE ONSET ALZHEIMER'S DISEASE WITHOUT BEHAVIORAL DISTURBANCE (HCC): ICD-10-CM

## 2018-11-29 DIAGNOSIS — F41.8 DEPRESSION WITH ANXIETY: ICD-10-CM

## 2018-11-29 DIAGNOSIS — D64.9 ANEMIA, UNSPECIFIED TYPE: ICD-10-CM

## 2018-11-29 DIAGNOSIS — G89.29 CHRONIC MIDLINE LOW BACK PAIN, WITH SCIATICA PRESENCE UNSPECIFIED: ICD-10-CM

## 2018-11-29 DIAGNOSIS — M54.5 CHRONIC MIDLINE LOW BACK PAIN, WITH SCIATICA PRESENCE UNSPECIFIED: ICD-10-CM

## 2018-11-29 DIAGNOSIS — R60.9 DEPENDENT EDEMA: ICD-10-CM

## 2018-11-29 DIAGNOSIS — K21.9 GASTROESOPHAGEAL REFLUX DISEASE WITHOUT ESOPHAGITIS: ICD-10-CM

## 2018-11-29 DIAGNOSIS — R79.89 ELEVATED TSH: ICD-10-CM

## 2018-11-29 DIAGNOSIS — R94.6 ABNORMAL RESULTS OF THYROID FUNCTION STUDIES: ICD-10-CM

## 2018-11-29 DIAGNOSIS — Z00.00 ROUTINE HEALTH MAINTENANCE: ICD-10-CM

## 2018-11-29 DIAGNOSIS — N18.30 CKD (CHRONIC KIDNEY DISEASE) STAGE 3, GFR 30-59 ML/MIN (HCC): ICD-10-CM

## 2018-11-29 DIAGNOSIS — R21 RASH: ICD-10-CM

## 2018-11-29 PROCEDURE — 99214 OFFICE O/P EST MOD 30 MIN: CPT | Performed by: FAMILY MEDICINE

## 2018-11-29 NOTE — PROGRESS NOTES
Subjective     Sandra Mane is a 73 y.o. female, who presents with a chief complaint of   Chief Complaint   Patient presents with   • Rash     on both lower leg, drainage     • Hyperlipidemia     Hyperlipidemia     Anemia     Chronic Kidney Disease   Associated symptoms include a rash.   Leg Swelling   Associated symptoms include a rash.   Cough   Associated symptoms include a rash.   Rash     1. Dementia.  Pt tolerating the donepezil.  Started memantine last time per Dr. Dr. Cole's recommendations and she discontinued it due to side effects.    2. Depression with anxiety.  She stopped the citalopram and bupropion XL 2 weeks ago due to sedation and she is feeling much better.  Mood is fine.    3. Chronic back pain.  Pt feels that her legs are a little weaker.  She sees Dr. Rea.      4. Rash legs.  Started after she used Amlactin cream on her lower legs.  Was red and weeping.  Improving.  Doesn't itch or hurt.    The following portions of the patient's history were reviewed and updated as appropriate: allergies, current medications, past family history, past medical history, past social history, past surgical history and problem list.    Allergies: Patient has no known allergies.    Review of Systems   Constitutional: Negative.    HENT: Negative.    Eyes: Negative.    Respiratory: Negative.    Cardiovascular: Positive for leg swelling.   Gastrointestinal: Negative.    Endocrine: Negative.    Genitourinary: Negative.    Musculoskeletal: Positive for back pain.   Skin: Positive for rash.   Allergic/Immunologic: Negative.    Neurological: Negative.    Hematological: Negative.    Psychiatric/Behavioral: The patient is nervous/anxious.      Objective     Wt Readings from Last 3 Encounters:   11/29/18 94.3 kg (208 lb)   11/14/18 90.6 kg (199 lb 12.8 oz)   08/27/18 82.6 kg (182 lb)     Temp Readings from Last 3 Encounters:   11/29/18 98.2 °F (36.8 °C)   11/14/18 98.7 °F (37.1 °C)   08/27/18 99 °F (37.2 °C) (Oral)      BP Readings from Last 3 Encounters:   11/29/18 130/84   11/14/18 120/60   08/27/18 104/68     Pulse Readings from Last 3 Encounters:   11/29/18 69   11/14/18 67   08/27/18 67     Body mass index is 34.61 kg/m².  SpO2 Readings from Last 3 Encounters:   02/26/18 96%   12/04/17 97%   10/10/17 95%       Physical Exam   Constitutional: She is oriented to person, place, and time. She appears well-developed and well-nourished.   Sitting in wheelchair.   HENT:   Head: Normocephalic and atraumatic.   Eyes: Conjunctivae and EOM are normal.   Neck: Neck supple.   Cardiovascular: Normal rate, regular rhythm and normal heart sounds. Exam reveals no gallop and no friction rub.   No murmur heard.  Pulmonary/Chest: Effort normal and breath sounds normal. No respiratory distress. She has no wheezes. She has no rales.   Abdominal: Soft. Bowel sounds are normal. There is no hepatosplenomegaly.   Musculoskeletal: She exhibits edema. She exhibits no deformity.   Back not examined.   Lymphadenopathy:     She has no cervical adenopathy.   Neurological: She is alert and oriented to person, place, and time.   Skin: Skin is warm and dry. Rash (both legs below the knee with erythematous papules, wheels.) noted.   Psychiatric: She has a normal mood and affect. Her behavior is normal.   Nursing note and vitals reviewed.    Results for orders placed or performed in visit on 11/19/18   TSH   Result Value Ref Range    TSH 5.190 (H) 0.270 - 4.200 mIU/mL   Lipid Panel With / Chol / HDL Ratio   Result Value Ref Range    Total Cholesterol 156 0 - 200 mg/dL    Triglycerides 129 0 - 150 mg/dL    HDL Cholesterol 47 40 - 60 mg/dL    VLDL Cholesterol 25.8 7 - 27 mg/dL    LDL Cholesterol  83 0 - 100 mg/dL    Chol/HDL Ratio 3.32    Comprehensive Metabolic Panel   Result Value Ref Range    Glucose 95 65 - 99 mg/dL    BUN 15 8 - 23 mg/dL    Creatinine 1.26 (H) 0.57 - 1.00 mg/dL    eGFR Non African Am 42 (L) >60 mL/min/1.73    eGFR African Am 51 (L) >60  mL/min/1.73    BUN/Creatinine Ratio 11.9 7.0 - 25.0    Sodium 148 (H) 136 - 145 mmol/L    Potassium 5.3 (H) 3.5 - 5.2 mmol/L    Chloride 110 (H) 98 - 107 mmol/L    Total CO2 27.8 22.0 - 29.0 mmol/L    Calcium 9.3 8.8 - 10.5 mg/dL    Total Protein 8.6 (H) 6.0 - 8.5 g/dL    Albumin 3.70 3.50 - 5.20 g/dL    Globulin 4.9 gm/dL    A/G Ratio 0.8 g/dL    Total Bilirubin 0.2 0.2 - 1.2 mg/dL    Alkaline Phosphatase 84 40 - 129 U/L    AST (SGOT) 19 5 - 32 U/L    ALT (SGPT) 10 5 - 33 U/L   CBC & Differential   Result Value Ref Range    WBC 6.32 4.80 - 10.80 10*3/mm3    RBC 3.65 (L) 4.20 - 5.40 10*6/mm3    Hemoglobin 10.5 (L) 12.0 - 16.0 g/dL    Hematocrit 34.7 (L) 37.0 - 47.0 %    MCV 95.1 81.0 - 99.0 fL    MCH 28.8 27.0 - 31.0 pg    MCHC 30.3 (L) 31.0 - 37.0 g/dL    RDW 14.6 (H) 11.5 - 14.5 %    Platelets 219 140 - 500 10*3/mm3    Neutrophil Rel % 53.0 45.0 - 70.0 %    Lymphocyte Rel % 26.4 20.0 - 45.0 %    Monocyte Rel % 8.2 (H) 3.0 - 8.0 %    Eosinophil Rel % 11.6 (H) 0.0 - 4.0 %    Basophil Rel % 0.5 0.0 - 2.0 %    Neutrophils Absolute 3.35 1.50 - 8.30 10*3/mm3    Lymphocytes Absolute 1.67 0.60 - 4.80 10*3/mm3    Monocytes Absolute 0.52 0.00 - 1.00 10*3/mm3    Eosinophils Absolute 0.73 (H) 0.10 - 0.30 10*3/mm3    Basophils Absolute 0.03 0.00 - 0.20 10*3/mm3    Immature Granulocyte Rel % 0.3 0.0 - 0.5 %    Immature Grans Absolute 0.02 0.00 - 0.03 10*3/mm3    nRBC 0.0 0.0 - 0.0 /100 WBC       Assessment/Plan   Sandra was seen today for rash and hyperlipidemia.    Diagnoses and all orders for this visit:    Hyperlipidemia, unspecified hyperlipidemia type    CKD (chronic kidney disease) stage 3, GFR 30-59 ml/min (CMS/[facility])    Depression with anxiety    Chronic midline low back pain, with sciatica presence unspecified    Anemia, unspecified type    Late onset Alzheimer's disease without behavioral disturbance    Gastroesophageal reflux disease without esophagitis    Dependent edema    Routine health maintenance    Elevated TSH  -      T4, Free  -     TSH  -     Thyroid Antibodies    Abnormal results of thyroid function studies   -     T4, Free  -     TSH    Rash    1. Hyperlipidemia.  Mild.  Controlled with lifestyle measures.    2. CKDIII.  Stable.  Avoid NSAIDs.    3. Depression with anxiety. Stay off medication and monitor.    4. Chronic back pain.  Stable. Per pain management. They are considering a pain pump.    5. Anemia.  Chronic.  Stable.  Ferritin, B12, folate normal.     6. Alzheimer's.  She has had neuropsych testing and neurology consult.  Dr. Cole's note and recommendations were reviewed.  Continue memantine. Consider repeat neuropsych testing next time; she wants to hold off on this right now.    8. GERD. Controlled with omeprazole.    9. Dependent edema.  Continue furosemide 20 mg prn with KCl.  Compression stockings.    10. Routine health maint.  They received the Cologuard in the mail but haven't sent it in.  Next mammogram 5/2019.     11. Elevated TSH.  Check thyroid panel today.    12. Rash.  Likely d/t Amlactin cream.  Improving.  Declines topical steroid.      Outpatient Medications Prior to Visit   Medication Sig Dispense Refill   • buPROPion XL (WELLBUTRIN XL) 300 MG 24 hr tablet TAKE ONE TABLET BY MOUTH EVERY MORNING 30 tablet 5   • citalopram (CeleXA) 40 MG tablet TAKE ONE TABLET BY MOUTH DAILY 30 tablet 5   • clonazePAM (KlonoPIN) 0.5 MG tablet TAKE TWO TABLETS BY MOUTH EVERY NIGHT AT BEDTIME AS NEEDED 60 tablet 2   • fluticasone (FLONASE) 50 MCG/ACT nasal spray 2 sprays into the nostril(s) as directed by provider Daily.     • furosemide (LASIX) 20 MG tablet Take 1 tablet by mouth Daily. 30 tablet 3   • gabapentin (NEURONTIN) 400 MG capsule Take 400 mg by mouth every 4 (four) hours.     • memantine (NAMENDA) 5 MG tablet Take 10 mg by mouth Daily.     • Morphine (MS CONTIN) 60 MG 12 hr tablet Take 100 mg by mouth 2 (Two) Times a Day.     • Morphine (MSIR) 15 MG tablet Take 15 mg by mouth 2 (Two) Times a Day.     •  omeprazole (priLOSEC) 20 MG capsule Take 20 mg by mouth.     • ciprofloxacin (CIPRO) 250 MG tablet Take 1 tablet by mouth Every 12 (Twelve) Hours. 6 tablet 0     No facility-administered medications prior to visit.      No orders of the defined types were placed in this encounter.    [unfilled]  Medications Discontinued During This Encounter   Medication Reason   • ciprofloxacin (CIPRO) 250 MG tablet *Therapy completed       Return in about 3 months (around 2/28/2019).

## 2018-11-30 RX ORDER — CLONAZEPAM 0.5 MG/1
TABLET ORAL
Qty: 60 TABLET | Refills: 2 | Status: SHIPPED | OUTPATIENT
Start: 2018-11-30 | End: 2019-02-26 | Stop reason: SDUPTHER

## 2018-12-07 ENCOUNTER — DOCUMENTATION (OUTPATIENT)
Dept: INTERNAL MEDICINE | Facility: CLINIC | Age: 73
End: 2018-12-07

## 2018-12-07 RX ORDER — BETAMETHASONE DIPROPIONATE 0.5 MG/G
CREAM TOPICAL 2 TIMES DAILY
Qty: 30 G | Refills: 0 | Status: SHIPPED | OUTPATIENT
Start: 2018-12-07 | End: 2018-12-07 | Stop reason: SDUPTHER

## 2018-12-07 RX ORDER — BETAMETHASONE DIPROPIONATE 0.5 MG/G
CREAM TOPICAL 2 TIMES DAILY
Qty: 30 G | Refills: 0 | Status: SHIPPED | OUTPATIENT
Start: 2018-12-07 | End: 2020-06-01

## 2018-12-08 ENCOUNTER — TELEPHONE (OUTPATIENT)
Dept: INTERNAL MEDICINE | Facility: CLINIC | Age: 73
End: 2018-12-08

## 2018-12-08 NOTE — TELEPHONE ENCOUNTER
Antibiotic cream sent to pharmacy.  ----- Message from Amanda Garcia MA sent at 2018  1:23 PM EST -----  Regarding: FW: SECOND CALL  Contact: [phone]      ----- Message -----  From: Nidia Santos  Sent: 2018   1:16 PM  To: Johnathan Mittal [facility] [place]  Subject: SECOND CALL                                      :  49  W[facility] PT.    PATIENT CALLED WANTING TO TALK TO SONG. SHE WANTS A STEROID CREAM CALLED IN FOR HER LEGS. DR. OCASIO SAW THEM LAST WEEK.    PLEASE CALL AND LET HER KNOW IF SOMETHING WILL BE CALLED IN.

## 2018-12-12 DIAGNOSIS — G30.1 LATE ONSET ALZHEIMER'S DISEASE WITHOUT BEHAVIORAL DISTURBANCE (HCC): ICD-10-CM

## 2018-12-12 DIAGNOSIS — F02.80 LATE ONSET ALZHEIMER'S DISEASE WITHOUT BEHAVIORAL DISTURBANCE (HCC): ICD-10-CM

## 2018-12-13 RX ORDER — MEMANTINE HYDROCHLORIDE 5 MG/1
TABLET ORAL
Qty: 180 TABLET | Refills: 1 | Status: SHIPPED | OUTPATIENT
Start: 2018-12-13 | End: 2019-06-09 | Stop reason: SDUPTHER

## 2018-12-14 ENCOUNTER — TELEPHONE (OUTPATIENT)
Dept: INTERNAL MEDICINE | Facility: CLINIC | Age: 73
End: 2018-12-14

## 2018-12-14 NOTE — TELEPHONE ENCOUNTER
----- Message from Amanda Garcia MA sent at 2018  1:23 PM EST -----  Regarding: FW: SECOND CALL  Contact: [phone]      ----- Message -----  From: Nidia Santos  Sent: 2018   1:16 PM  To: Johnathan Mittal [facility]  Subject: SECOND CALL                                      :  49  WIL PT.    PATIENT CALLED WANTING TO TALK TO SONG. SHE WANTS A STEROID CREAM CALLED IN FOR HER LEGS. DR. OCASIO SAW THEM LAST WEEK.    PLEASE CALL AND LET HER KNOW IF SOMETHING WILL BE CALLED IN.    Pt has the cream she wanted for her legs. Dr Blanton called in. dg

## 2018-12-28 ENCOUNTER — TELEPHONE (OUTPATIENT)
Dept: INTERNAL MEDICINE | Facility: CLINIC | Age: 73
End: 2018-12-28

## 2018-12-28 NOTE — TELEPHONE ENCOUNTER
----- Message from Rhonda Hairston sent at 12/27/2018  3:34 PM EST -----  Regarding: LEG PAIN EXTREME  Contact: [phone]  W[facility] PT    Patient called to say that she is having problems with her legs. She is asking about the symptoms for diabetes. She said that the cream that dr OCASIO told her to get, ( Amlactin),  broke out her legs with oozing rashes. Rash is better but now:    leg asleep/ tingling hard to walk- painful to touch skin on both legs.    I scheduled her for the first available appointment, Jan 17th. Please let her know if we can get her in sooner. It is very painful.     Thanks!  rhonda     Is there something else we could call her in.dg

## 2019-01-17 ENCOUNTER — OFFICE VISIT (OUTPATIENT)
Dept: INTERNAL MEDICINE | Facility: CLINIC | Age: 74
End: 2019-01-17

## 2019-01-17 VITALS
SYSTOLIC BLOOD PRESSURE: 130 MMHG | WEIGHT: 193 LBS | DIASTOLIC BLOOD PRESSURE: 88 MMHG | RESPIRATION RATE: 16 BRPM | HEIGHT: 65 IN | TEMPERATURE: 98.6 F | BODY MASS INDEX: 32.15 KG/M2

## 2019-01-17 DIAGNOSIS — F41.8 DEPRESSION WITH ANXIETY: ICD-10-CM

## 2019-01-17 DIAGNOSIS — L03.031 CELLULITIS OF GREAT TOE, RIGHT: Primary | ICD-10-CM

## 2019-01-17 PROCEDURE — 99213 OFFICE O/P EST LOW 20 MIN: CPT | Performed by: FAMILY MEDICINE

## 2019-01-17 RX ORDER — SULFAMETHOXAZOLE AND TRIMETHOPRIM 800; 160 MG/1; MG/1
1 TABLET ORAL 2 TIMES DAILY
Qty: 20 TABLET | Refills: 0 | Status: SHIPPED | OUTPATIENT
Start: 2019-01-17 | End: 2020-03-03

## 2019-01-17 RX ORDER — FLUOXETINE 20 MG/1
20 TABLET, FILM COATED ORAL DAILY
Qty: 30 TABLET | Refills: 2 | Status: SHIPPED | OUTPATIENT
Start: 2019-01-17 | End: 2019-04-14 | Stop reason: SDUPTHER

## 2019-02-16 DIAGNOSIS — R60.9 DEPENDENT EDEMA: ICD-10-CM

## 2019-02-18 RX ORDER — FUROSEMIDE 20 MG/1
TABLET ORAL
Qty: 90 TABLET | Refills: 2 | Status: SHIPPED | OUTPATIENT
Start: 2019-02-18 | End: 2020-04-20

## 2019-02-18 RX ORDER — POTASSIUM CHLORIDE 750 MG/1
TABLET, FILM COATED, EXTENDED RELEASE ORAL
Qty: 90 TABLET | Refills: 2 | Status: SHIPPED | OUTPATIENT
Start: 2019-02-18 | End: 2020-04-20

## 2019-02-22 LAB
T4 FREE SERPL-MCNC: 0.89 NG/DL (ref 0.93–1.7)
THYROGLOB AB SERPL-ACNC: <1 IU/ML (ref 0–0.9)
THYROPEROXIDASE AB SERPL-ACNC: 16 IU/ML (ref 0–34)
TSH SERPL DL<=0.005 MIU/L-ACNC: 2.94 MIU/ML (ref 0.27–4.2)

## 2019-02-26 DIAGNOSIS — F41.9 ANXIETY: ICD-10-CM

## 2019-02-27 RX ORDER — CLONAZEPAM 0.5 MG/1
TABLET ORAL
Qty: 60 TABLET | Refills: 1 | Status: SHIPPED | OUTPATIENT
Start: 2019-02-27 | End: 2019-02-28 | Stop reason: SDUPTHER

## 2019-02-28 ENCOUNTER — OFFICE VISIT (OUTPATIENT)
Dept: INTERNAL MEDICINE | Facility: CLINIC | Age: 74
End: 2019-02-28

## 2019-02-28 VITALS
SYSTOLIC BLOOD PRESSURE: 110 MMHG | HEIGHT: 65 IN | HEART RATE: 68 BPM | TEMPERATURE: 98.6 F | WEIGHT: 201.4 LBS | BODY MASS INDEX: 33.55 KG/M2 | OXYGEN SATURATION: 97 % | RESPIRATION RATE: 16 BRPM | DIASTOLIC BLOOD PRESSURE: 50 MMHG

## 2019-02-28 DIAGNOSIS — N18.30 CKD (CHRONIC KIDNEY DISEASE) STAGE 3, GFR 30-59 ML/MIN (HCC): ICD-10-CM

## 2019-02-28 DIAGNOSIS — E78.5 HYPERLIPIDEMIA, UNSPECIFIED HYPERLIPIDEMIA TYPE: Primary | ICD-10-CM

## 2019-02-28 DIAGNOSIS — F41.8 DEPRESSION WITH ANXIETY: ICD-10-CM

## 2019-02-28 DIAGNOSIS — Z00.00 ROUTINE HEALTH MAINTENANCE: ICD-10-CM

## 2019-02-28 DIAGNOSIS — R94.6 ABNORMAL THYROID FUNCTION TEST: ICD-10-CM

## 2019-02-28 DIAGNOSIS — R60.9 DEPENDENT EDEMA: ICD-10-CM

## 2019-02-28 DIAGNOSIS — K21.9 GASTROESOPHAGEAL REFLUX DISEASE WITHOUT ESOPHAGITIS: ICD-10-CM

## 2019-02-28 DIAGNOSIS — F41.9 ANXIETY: ICD-10-CM

## 2019-02-28 DIAGNOSIS — M54.5 CHRONIC MIDLINE LOW BACK PAIN, WITH SCIATICA PRESENCE UNSPECIFIED: ICD-10-CM

## 2019-02-28 DIAGNOSIS — R79.89 ELEVATED TSH: ICD-10-CM

## 2019-02-28 DIAGNOSIS — G89.29 CHRONIC MIDLINE LOW BACK PAIN, WITH SCIATICA PRESENCE UNSPECIFIED: ICD-10-CM

## 2019-02-28 DIAGNOSIS — D64.9 ANEMIA, UNSPECIFIED TYPE: ICD-10-CM

## 2019-02-28 DIAGNOSIS — F02.80 LATE ONSET ALZHEIMER'S DISEASE WITHOUT BEHAVIORAL DISTURBANCE (HCC): ICD-10-CM

## 2019-02-28 DIAGNOSIS — G30.1 LATE ONSET ALZHEIMER'S DISEASE WITHOUT BEHAVIORAL DISTURBANCE (HCC): ICD-10-CM

## 2019-02-28 PROCEDURE — 99214 OFFICE O/P EST MOD 30 MIN: CPT | Performed by: FAMILY MEDICINE

## 2019-02-28 RX ORDER — CLONAZEPAM 0.5 MG/1
1 TABLET ORAL NIGHTLY PRN
Qty: 60 TABLET | Refills: 2 | Status: SHIPPED | OUTPATIENT
Start: 2019-02-28 | End: 2019-07-29

## 2019-02-28 NOTE — PROGRESS NOTES
Subjective     Sandra Mane is a 74 y.o. female, who presents with a chief complaint of   Chief Complaint   Patient presents with   • Hyperlipidemia     Hyperlipidemia     Anemia     Chronic Kidney Disease       1. Dementia.  Pt tolerating the donepezil.  Didn't tolerate memantine.    2. Depression with anxiety.  Started fluoxetine last time.  She states she is feeling better.    3. Chronic back pain.  Pt states she recently had 2 epidural injections which helped a little. She sees Dr. Rea.        The following portions of the patient's history were reviewed and updated as appropriate: allergies, current medications, past family history, past medical history, past social history, past surgical history and problem list.    Allergies: Patient has no known allergies.    Review of Systems   Constitutional: Negative.    HENT: Negative.    Eyes: Negative.    Cardiovascular: Positive for leg swelling.   Gastrointestinal: Negative.    Endocrine: Negative.    Genitourinary: Negative.    Musculoskeletal: Positive for back pain.   Allergic/Immunologic: Negative.    Neurological: Negative.    Hematological: Negative.    Psychiatric/Behavioral: The patient is not nervous/anxious.      Objective     Wt Readings from Last 3 Encounters:   02/28/19 91.4 kg (201 lb 6.4 oz)   01/17/19 87.5 kg (193 lb)   11/29/18 94.3 kg (208 lb)     Temp Readings from Last 3 Encounters:   02/28/19 98.6 °F (37 °C)   01/17/19 98.6 °F (37 °C)   11/29/18 98.2 °F (36.8 °C)     BP Readings from Last 3 Encounters:   02/28/19 110/50   01/17/19 130/88   11/29/18 130/84     Pulse Readings from Last 3 Encounters:   02/28/19 68   11/29/18 69   11/14/18 67     Body mass index is 33.51 kg/m².  SpO2 Readings from Last 3 Encounters:   02/26/18 96%   12/04/17 97%   10/10/17 95%       Physical Exam   Constitutional: She is oriented to person, place, and time. She appears well-developed and well-nourished.   Sitting in wheelchair.   HENT:   Head: Normocephalic and  atraumatic.   Eyes: Conjunctivae and EOM are normal.   Neck: Neck supple.   Cardiovascular: Normal rate, regular rhythm and normal heart sounds. Exam reveals no gallop and no friction rub.   No murmur heard.  Pulmonary/Chest: Effort normal and breath sounds normal. No respiratory distress. She has no wheezes. She has no rales.   Abdominal: Soft. Bowel sounds are normal. There is no hepatosplenomegaly.   Musculoskeletal: She exhibits edema. She exhibits no deformity.   Back not examined.   Lymphadenopathy:     She has no cervical adenopathy.   Neurological: She is alert and oriented to person, place, and time.   Skin: Skin is warm and dry. No rash noted.   Psychiatric: She has a normal mood and affect. Her behavior is normal.   Nursing note and vitals reviewed.    Results for orders placed or performed in visit on 11/29/18   T4, Free   Result Value Ref Range    Free T4 0.89 (L) 0.93 - 1.70 ng/dL   TSH   Result Value Ref Range    TSH 2.940 0.270 - 4.200 mIU/mL   Thyroid Antibodies   Result Value Ref Range    Thyroid Peroxidase Antibody 16 0 - 34 IU/mL    Thyroglobulin Ab <1.0 0.0 - 0.9 IU/mL       Assessment/Plan   Sandra was seen today for hyperlipidemia.    Diagnoses and all orders for this visit:    Hyperlipidemia, unspecified hyperlipidemia type  -     Comprehensive Metabolic Panel; Future  -     Lipid Panel With / Chol / HDL Ratio; Future    CKD (chronic kidney disease) stage 3, GFR 30-59 ml/min (CMS/[facility])    Depression with anxiety    Chronic midline low back pain, with sciatica presence unspecified    Anemia, unspecified type  -     CBC & Differential; Future    Late onset Alzheimer's disease without behavioral disturbance    Gastroesophageal reflux disease without esophagitis    Dependent edema    Elevated TSH  -     TSH; Future  -     T4, Free; Future    Routine health maintenance    Abnormal thyroid function test  -     TSH; Future  -     T4, Free; Future    1. Hyperlipidemia.  Mild.  Controlled with lifestyle  measures.    2. CKDIII.  Stable on last labs.  Avoid NSAIDs.    3. Depression with anxiety.  Controlled with fluoxetine.    4. Chronic back pain.  Stable. Per pain management.    5. Anemia.  Chronic.  Stable.  Ferritin, B12, folate normal.     6. Alzheimer's.  She has had neuropsych testing and neurology consult.  Dr. Cole's note and recommendations were reviewed.  Continue memantine.    8. GERD. Controlled with omeprazole.    9. Dependent edema.  Continue furosemide 20 mg prn with KCl.  Compression stockings.    10. Elevated TSH.  Normal last time.    11. Routine health maint.  They received the Cologuard in the mail but haven't sent it in.  Next mammogram 5/2019.       Outpatient Medications Prior to Visit   Medication Sig Dispense Refill   • betamethasone dipropionate (DIPROLENE) 0.05 % cream Apply  topically to the appropriate area as directed 2 (Two) Times a Day. Apply to affected area BID 30 g 0   • clonazePAM (KlonoPIN) 0.5 MG tablet TAKE TWO TABLETS BY MOUTH EVERY NIGHT AT BEDTIME AS NEEDED 60 tablet 1   • FLUoxetine (PROzac) 20 MG tablet Take 1 tablet by mouth Daily for 90 days. 30 tablet 2   • fluticasone (FLONASE) 50 MCG/ACT nasal spray 2 sprays into the nostril(s) as directed by provider Daily.     • furosemide (LASIX) 20 MG tablet TAKE ONE TABLET BY MOUTH DAILY 90 tablet 2   • gabapentin (NEURONTIN) 400 MG capsule Take 400 mg by mouth every 4 (four) hours.     • memantine (NAMENDA) 5 MG tablet TAKE ONE TABLET BY MOUTH DAILY FOR 7 DAYS, THEN INCREASE TO TAKE TWO TABLETS BY MOUTH DAILY 180 tablet 1   • Morphine (MS CONTIN) 60 MG 12 hr tablet Take 100 mg by mouth 2 (Two) Times a Day.     • Morphine (MSIR) 15 MG tablet Take 15 mg by mouth 2 (Two) Times a Day.     • omeprazole (priLOSEC) 20 MG capsule Take 20 mg by mouth.     • potassium chloride (K-DUR) 10 MEQ CR tablet TAKE ONE TABLET BY MOUTH DAILY 90 tablet 2   • sulfamethoxazole-trimethoprim (BACTRIM DS,SEPTRA DS) 800-160 MG per tablet Take 1 tablet  by mouth 2 (Two) Times a Day. 20 tablet 0     No facility-administered medications prior to visit.      No orders of the defined types were placed in this encounter.    [unfilled]  There are no discontinued medications.    Return in about 4 months (around 6/28/2019).

## 2019-04-14 DIAGNOSIS — F41.8 DEPRESSION WITH ANXIETY: ICD-10-CM

## 2019-04-15 RX ORDER — FLUOXETINE 20 MG/1
TABLET, FILM COATED ORAL
Qty: 90 TABLET | Refills: 1 | Status: SHIPPED | OUTPATIENT
Start: 2019-04-15 | End: 2019-10-16 | Stop reason: SDUPTHER

## 2019-06-09 DIAGNOSIS — F02.80 LATE ONSET ALZHEIMER'S DISEASE WITHOUT BEHAVIORAL DISTURBANCE (HCC): ICD-10-CM

## 2019-06-09 DIAGNOSIS — G30.1 LATE ONSET ALZHEIMER'S DISEASE WITHOUT BEHAVIORAL DISTURBANCE (HCC): ICD-10-CM

## 2019-06-10 RX ORDER — MEMANTINE HYDROCHLORIDE 5 MG/1
TABLET ORAL
Qty: 180 TABLET | Refills: 1 | Status: SHIPPED | OUTPATIENT
Start: 2019-06-10 | End: 2019-06-12 | Stop reason: SDUPTHER

## 2019-06-11 DIAGNOSIS — G30.1 LATE ONSET ALZHEIMER'S DISEASE WITHOUT BEHAVIORAL DISTURBANCE (HCC): ICD-10-CM

## 2019-06-11 DIAGNOSIS — F02.80 LATE ONSET ALZHEIMER'S DISEASE WITHOUT BEHAVIORAL DISTURBANCE (HCC): ICD-10-CM

## 2019-06-12 DIAGNOSIS — F02.80 LATE ONSET ALZHEIMER'S DISEASE WITHOUT BEHAVIORAL DISTURBANCE (HCC): ICD-10-CM

## 2019-06-12 DIAGNOSIS — G30.1 LATE ONSET ALZHEIMER'S DISEASE WITHOUT BEHAVIORAL DISTURBANCE (HCC): ICD-10-CM

## 2019-06-12 RX ORDER — MEMANTINE HYDROCHLORIDE 5 MG/1
TABLET ORAL
Qty: 60 TABLET | Refills: 0 | OUTPATIENT
Start: 2019-06-12

## 2019-06-12 RX ORDER — MEMANTINE HYDROCHLORIDE 5 MG/1
TABLET ORAL
Qty: 60 TABLET | Refills: 0 | Status: SHIPPED | OUTPATIENT
Start: 2019-06-12 | End: 2019-06-13 | Stop reason: DRUGHIGH

## 2019-06-13 RX ORDER — MEMANTINE HYDROCHLORIDE 10 MG/1
10 TABLET ORAL DAILY
Qty: 90 TABLET | Refills: 1 | Status: SHIPPED | OUTPATIENT
Start: 2019-06-13 | End: 2019-12-07 | Stop reason: SDUPTHER

## 2019-06-20 ENCOUNTER — LAB (OUTPATIENT)
Dept: INTERNAL MEDICINE | Facility: CLINIC | Age: 74
End: 2019-06-20

## 2019-06-20 DIAGNOSIS — R79.89 ELEVATED TSH: ICD-10-CM

## 2019-06-20 DIAGNOSIS — E78.5 HYPERLIPIDEMIA, UNSPECIFIED HYPERLIPIDEMIA TYPE: ICD-10-CM

## 2019-06-20 DIAGNOSIS — D64.9 ANEMIA, UNSPECIFIED TYPE: ICD-10-CM

## 2019-06-20 DIAGNOSIS — R94.6 ABNORMAL THYROID FUNCTION TEST: ICD-10-CM

## 2019-06-21 LAB
ALBUMIN SERPL-MCNC: 4.7 G/DL (ref 3.5–5.2)
ALBUMIN/GLOB SERPL: 1.7 G/DL
ALP SERPL-CCNC: 73 U/L (ref 39–117)
ALT SERPL-CCNC: 12 U/L (ref 1–33)
AST SERPL-CCNC: 24 U/L (ref 1–32)
BASOPHILS # BLD AUTO: 0.02 10*3/MM3 (ref 0–0.2)
BASOPHILS NFR BLD AUTO: 0.5 % (ref 0–1.5)
BILIRUB SERPL-MCNC: 0.4 MG/DL (ref 0.2–1.2)
BUN SERPL-MCNC: 18 MG/DL (ref 8–23)
BUN/CREAT SERPL: 15.3 (ref 7–25)
CALCIUM SERPL-MCNC: 9 MG/DL (ref 8.6–10.5)
CHLORIDE SERPL-SCNC: 98 MMOL/L (ref 98–107)
CHOLEST SERPL-MCNC: 208 MG/DL (ref 0–200)
CHOLEST/HDLC SERPL: 3.85 {RATIO}
CO2 SERPL-SCNC: 25.9 MMOL/L (ref 22–29)
CREAT SERPL-MCNC: 1.18 MG/DL (ref 0.57–1)
EOSINOPHIL # BLD AUTO: 0.22 10*3/MM3 (ref 0–0.4)
EOSINOPHIL NFR BLD AUTO: 5.6 % (ref 0.3–6.2)
ERYTHROCYTE [DISTWIDTH] IN BLOOD BY AUTOMATED COUNT: 13.7 % (ref 12.3–15.4)
GLOBULIN SER CALC-MCNC: 2.7 GM/DL
GLUCOSE SERPL-MCNC: 96 MG/DL (ref 65–99)
HCT VFR BLD AUTO: 34.2 % (ref 34–46.6)
HDLC SERPL-MCNC: 54 MG/DL (ref 40–60)
HGB BLD-MCNC: 10.6 G/DL (ref 12–15.9)
IMM GRANULOCYTES # BLD AUTO: 0.01 10*3/MM3 (ref 0–0.05)
IMM GRANULOCYTES NFR BLD AUTO: 0.3 % (ref 0–0.5)
LDLC SERPL CALC-MCNC: 113 MG/DL (ref 0–100)
LYMPHOCYTES # BLD AUTO: 1.16 10*3/MM3 (ref 0.7–3.1)
LYMPHOCYTES NFR BLD AUTO: 29.4 % (ref 19.6–45.3)
MCH RBC QN AUTO: 29.4 PG (ref 26.6–33)
MCHC RBC AUTO-ENTMCNC: 31 G/DL (ref 31.5–35.7)
MCV RBC AUTO: 95 FL (ref 79–97)
MONOCYTES # BLD AUTO: 0.38 10*3/MM3 (ref 0.1–0.9)
MONOCYTES NFR BLD AUTO: 9.6 % (ref 5–12)
NEUTROPHILS # BLD AUTO: 2.16 10*3/MM3 (ref 1.7–7)
NEUTROPHILS NFR BLD AUTO: 54.6 % (ref 42.7–76)
NRBC BLD AUTO-RTO: 0 /100 WBC (ref 0–0.2)
PLATELET # BLD AUTO: 186 10*3/MM3 (ref 140–450)
POTASSIUM SERPL-SCNC: 4.5 MMOL/L (ref 3.5–5.2)
PROT SERPL-MCNC: 7.4 G/DL (ref 6–8.5)
RBC # BLD AUTO: 3.6 10*6/MM3 (ref 3.77–5.28)
SODIUM SERPL-SCNC: 137 MMOL/L (ref 136–145)
T4 FREE SERPL-MCNC: 1.06 NG/DL (ref 0.93–1.7)
TRIGL SERPL-MCNC: 205 MG/DL (ref 0–150)
TSH SERPL DL<=0.005 MIU/L-ACNC: 3.28 MIU/ML (ref 0.27–4.2)
VLDLC SERPL CALC-MCNC: 41 MG/DL
WBC # BLD AUTO: 3.95 10*3/MM3 (ref 3.4–10.8)

## 2019-06-27 ENCOUNTER — OFFICE VISIT (OUTPATIENT)
Dept: INTERNAL MEDICINE | Facility: CLINIC | Age: 74
End: 2019-06-27

## 2019-06-27 VITALS
SYSTOLIC BLOOD PRESSURE: 110 MMHG | OXYGEN SATURATION: 93 % | WEIGHT: 210 LBS | DIASTOLIC BLOOD PRESSURE: 72 MMHG | TEMPERATURE: 98.3 F | RESPIRATION RATE: 16 BRPM | BODY MASS INDEX: 34.99 KG/M2 | HEIGHT: 65 IN | HEART RATE: 69 BPM

## 2019-06-27 DIAGNOSIS — G30.1 LATE ONSET ALZHEIMER'S DISEASE WITHOUT BEHAVIORAL DISTURBANCE (HCC): ICD-10-CM

## 2019-06-27 DIAGNOSIS — K21.9 GASTROESOPHAGEAL REFLUX DISEASE WITHOUT ESOPHAGITIS: ICD-10-CM

## 2019-06-27 DIAGNOSIS — F41.8 DEPRESSION WITH ANXIETY: ICD-10-CM

## 2019-06-27 DIAGNOSIS — F02.80 LATE ONSET ALZHEIMER'S DISEASE WITHOUT BEHAVIORAL DISTURBANCE (HCC): ICD-10-CM

## 2019-06-27 DIAGNOSIS — G89.29 CHRONIC MIDLINE LOW BACK PAIN, WITH SCIATICA PRESENCE UNSPECIFIED: ICD-10-CM

## 2019-06-27 DIAGNOSIS — M54.5 CHRONIC MIDLINE LOW BACK PAIN, WITH SCIATICA PRESENCE UNSPECIFIED: ICD-10-CM

## 2019-06-27 DIAGNOSIS — E78.5 HYPERLIPIDEMIA, UNSPECIFIED HYPERLIPIDEMIA TYPE: Primary | ICD-10-CM

## 2019-06-27 DIAGNOSIS — M25.562 CHRONIC PAIN OF LEFT KNEE: ICD-10-CM

## 2019-06-27 DIAGNOSIS — N18.30 CKD (CHRONIC KIDNEY DISEASE) STAGE 3, GFR 30-59 ML/MIN (HCC): ICD-10-CM

## 2019-06-27 DIAGNOSIS — G89.29 CHRONIC PAIN OF LEFT KNEE: ICD-10-CM

## 2019-06-27 DIAGNOSIS — Z12.39 ENCOUNTER FOR SCREENING FOR MALIGNANT NEOPLASM OF BREAST: ICD-10-CM

## 2019-06-27 DIAGNOSIS — R60.9 DEPENDENT EDEMA: ICD-10-CM

## 2019-06-27 DIAGNOSIS — Z00.00 ROUTINE HEALTH MAINTENANCE: ICD-10-CM

## 2019-06-27 DIAGNOSIS — D64.9 ANEMIA, UNSPECIFIED TYPE: ICD-10-CM

## 2019-06-27 PROCEDURE — 99214 OFFICE O/P EST MOD 30 MIN: CPT | Performed by: FAMILY MEDICINE

## 2019-06-27 NOTE — PROGRESS NOTES
Subjective     Sandra Mane is a 74 y.o. female, who presents with a chief complaint of   Chief Complaint   Patient presents with   • Hyperlipidemia     f/u on labs   • Anemia   • Chronic Kidney Disease   • Knee Pain     Hyperlipidemia     Anemia     Chronic Kidney Disease   Associated symptoms include arthralgias.   Knee Pain        1. Dementia.  Pt tolerating the donepezil.  Didn't tolerate memantine.    2. Depression with anxiety.  Pt states she is doing well with fluoxetine.  She gets bored.    3. Chronic back pain.  She sees Dr. Rea, who does epidural injections.    4. Left knee pain.  For a few months.  Doesn't swell.  No known injury.  Had the right knee replaced d/t OA.      The following portions of the patient's history were reviewed and updated as appropriate: allergies, current medications, past family history, past medical history, past social history, past surgical history and problem list.    Allergies: Patient has no known allergies.    Review of Systems   Constitutional: Negative.    HENT: Negative.    Eyes: Negative.    Cardiovascular: Positive for leg swelling.   Gastrointestinal: Negative.    Endocrine: Negative.    Genitourinary: Negative.    Musculoskeletal: Positive for arthralgias and back pain.   Allergic/Immunologic: Negative.    Neurological: Negative.    Hematological: Negative.    Psychiatric/Behavioral: The patient is not nervous/anxious.      Objective     Wt Readings from Last 3 Encounters:   06/27/19 95.3 kg (210 lb)   02/28/19 91.4 kg (201 lb 6.4 oz)   01/17/19 87.5 kg (193 lb)     Temp Readings from Last 3 Encounters:   06/27/19 98.3 °F (36.8 °C) (Oral)   02/28/19 98.6 °F (37 °C)   01/17/19 98.6 °F (37 °C)     BP Readings from Last 3 Encounters:   06/27/19 110/72   02/28/19 110/50   01/17/19 130/88     Pulse Readings from Last 3 Encounters:   06/27/19 69   02/28/19 68   11/29/18 69     Body mass index is 34.95 kg/m².  SpO2 Readings from Last 3 Encounters:   02/26/18 96%    12/04/17 97%   10/10/17 95%       Physical Exam   Constitutional: She is oriented to person, place, and time. She appears well-developed and well-nourished.   Sitting in wheelchair.   HENT:   Head: Normocephalic and atraumatic.   Eyes: Conjunctivae and EOM are normal.   Neck: Neck supple.   Cardiovascular: Normal rate, regular rhythm and normal heart sounds. Exam reveals no gallop and no friction rub.   No murmur heard.  Pulmonary/Chest: Effort normal and breath sounds normal. No respiratory distress. She has no wheezes. She has no rales.   Abdominal: Soft. Bowel sounds are normal. There is no hepatosplenomegaly.   Musculoskeletal: She exhibits edema. She exhibits no deformity.        Left knee: She exhibits normal range of motion and no swelling. Tenderness found. Medial joint line tenderness noted.   Back not examined.   Lymphadenopathy:     She has no cervical adenopathy.   Neurological: She is alert and oriented to person, place, and time.   Skin: Skin is warm and dry. No rash noted.   Psychiatric: She has a normal mood and affect. Her behavior is normal.   Nursing note and vitals reviewed.    Results for orders placed or performed in visit on 06/20/19   Lipid Panel With / Chol / HDL Ratio   Result Value Ref Range    Total Cholesterol 208 (H) 0 - 200 mg/dL    Triglycerides 205 (H) 0 - 150 mg/dL    HDL Cholesterol 54 40 - 60 mg/dL    VLDL Cholesterol 41 mg/dL    LDL Cholesterol  113 (H) 0 - 100 mg/dL    Chol/HDL Ratio 3.85    T4, Free   Result Value Ref Range    Free T4 1.06 0.93 - 1.70 ng/dL   TSH   Result Value Ref Range    TSH 3.280 0.270 - 4.200 mIU/mL   Comprehensive Metabolic Panel   Result Value Ref Range    Glucose 96 65 - 99 mg/dL    BUN 18 8 - 23 mg/dL    Creatinine 1.18 (H) 0.57 - 1.00 mg/dL    eGFR Non African Am 45 (L) >60 mL/min/1.73    eGFR African Am 55 (L) >60 mL/min/1.73    BUN/Creatinine Ratio 15.3 7.0 - 25.0    Sodium 137 136 - 145 mmol/L    Potassium 4.5 3.5 - 5.2 mmol/L    Chloride 98 98 -  107 mmol/L    Total CO2 25.9 22.0 - 29.0 mmol/L    Calcium 9.0 8.6 - 10.5 mg/dL    Total Protein 7.4 6.0 - 8.5 g/dL    Albumin 4.70 3.50 - 5.20 g/dL    Globulin 2.7 gm/dL    A/G Ratio 1.7 g/dL    Total Bilirubin 0.4 0.2 - 1.2 mg/dL    Alkaline Phosphatase 73 39 - 117 U/L    AST (SGOT) 24 1 - 32 U/L    ALT (SGPT) 12 1 - 33 U/L   CBC & Differential   Result Value Ref Range    WBC 3.95 3.40 - 10.80 10*3/mm3    RBC 3.60 (L) 3.77 - 5.28 10*6/mm3    Hemoglobin 10.6 (L) 12.0 - 15.9 g/dL    Hematocrit 34.2 34.0 - 46.6 %    MCV 95.0 79.0 - 97.0 fL    MCH 29.4 26.6 - 33.0 pg    MCHC 31.0 (L) 31.5 - 35.7 g/dL    RDW 13.7 12.3 - 15.4 %    Platelets 186 140 - 450 10*3/mm3    Neutrophil Rel % 54.6 42.7 - 76.0 %    Lymphocyte Rel % 29.4 19.6 - 45.3 %    Monocyte Rel % 9.6 5.0 - 12.0 %    Eosinophil Rel % 5.6 0.3 - 6.2 %    Basophil Rel % 0.5 0.0 - 1.5 %    Neutrophils Absolute 2.16 1.70 - 7.00 10*3/mm3    Lymphocytes Absolute 1.16 0.70 - 3.10 10*3/mm3    Monocytes Absolute 0.38 0.10 - 0.90 10*3/mm3    Eosinophils Absolute 0.22 0.00 - 0.40 10*3/mm3    Basophils Absolute 0.02 0.00 - 0.20 10*3/mm3    Immature Granulocyte Rel % 0.3 0.0 - 0.5 %    Immature Grans Absolute 0.01 0.00 - 0.05 10*3/mm3    nRBC 0.0 0.0 - 0.2 /100 WBC       Assessment/Plan   Sandra was seen today for hyperlipidemia, anemia, chronic kidney disease and knee pain.    Diagnoses and all orders for this visit:    Hyperlipidemia, unspecified hyperlipidemia type  -     Comprehensive Metabolic Panel; Future  -     TSH; Future  -     Lipid Panel With / Chol / HDL Ratio; Future    CKD (chronic kidney disease) stage 3, GFR 30-59 ml/min (CMS/[facility])  -     CBC & Differential; Future    Depression with anxiety    Chronic midline low back pain, with sciatica presence unspecified    Anemia, unspecified type    Late onset Alzheimer's disease without behavioral disturbance    Gastroesophageal reflux disease without esophagitis    Dependent edema    Routine health  maintenance    Encounter for screening for malignant neoplasm of breast  -     Mammo Screening Bilateral With CAD; Future    Chronic pain of left knee  -     XR Knee 3 View Left; Future    1. Hyperlipidemia.  Mild.  Controlled with lifestyle measures.    2. CKDIII.  Stable.  Labs reviewed.  Avoid NSAIDs.    3. Depression with anxiety.  Controlled with fluoxetine.    4. Chronic back pain.  Stable.  Per pain management.  Dr. Rea is planning RLS procedure tomorrow.    5. Anemia.  Chronic.  Stable.  Ferritin, B12, folate normal.     6. Alzheimer's.  She has had neuropsych testing and neurology consult.  Dr. Cole's note and recommendations were reviewed.  Continue memantine.    8. GERD. Controlled with omeprazole prn.    9. Dependent edema.  Continue furosemide 20 mg prn with KCl.  Compression stockings declined.  Elevated the legs.    10. Routine health maint.  They received the Cologuard in the mail but haven't sent it in.  Mammogram ordered.    11. Left knee pain.  Suspect OA.  Check XR.  Tylenol Arthritis prn.       Outpatient Medications Prior to Visit   Medication Sig Dispense Refill   • clonazePAM (KlonoPIN) 0.5 MG tablet Take 2 tablets by mouth At Night As Needed for Anxiety. 60 tablet 2   • FLUoxetine (PROzac) 20 MG tablet TAKE ONE TABLET BY MOUTH DAILY 90 tablet 1   • fluticasone (FLONASE) 50 MCG/ACT nasal spray 2 sprays into the nostril(s) as directed by provider Daily.     • furosemide (LASIX) 20 MG tablet TAKE ONE TABLET BY MOUTH DAILY 90 tablet 2   • gabapentin (NEURONTIN) 400 MG capsule Take 400 mg by mouth every 4 (four) hours.     • memantine (NAMENDA) 10 MG tablet Take 1 tablet by mouth Daily. 90 tablet 1   • Morphine (MS CONTIN) 60 MG 12 hr tablet Take 100 mg by mouth 2 (Two) Times a Day.     • Morphine (MSIR) 15 MG tablet Take 15 mg by mouth 2 (Two) Times a Day.     • omeprazole (priLOSEC) 20 MG capsule Take 20 mg by mouth.     • potassium chloride (K-DUR) 10 MEQ CR tablet TAKE ONE TABLET BY  MOUTH DAILY 90 tablet 2   • betamethasone dipropionate (DIPROLENE) 0.05 % cream Apply  topically to the appropriate area as directed 2 (Two) Times a Day. Apply to affected area BID 30 g 0   • sulfamethoxazole-trimethoprim (BACTRIM DS,SEPTRA DS) 800-160 MG per tablet Take 1 tablet by mouth 2 (Two) Times a Day. 20 tablet 0     No facility-administered medications prior to visit.      No orders of the defined types were placed in this encounter.    [unfilled]  There are no discontinued medications.    Return in about 4 months (around 10/27/2019).

## 2019-07-10 ENCOUNTER — HOSPITAL ENCOUNTER (OUTPATIENT)
Dept: GENERAL RADIOLOGY | Facility: HOSPITAL | Age: 74
Discharge: HOME | End: 2019-07-10

## 2019-07-10 ENCOUNTER — HOSPITAL ENCOUNTER (OUTPATIENT)
Dept: MAMMOGRAPHY | Facility: HOSPITAL | Age: 74
Discharge: HOME | End: 2019-07-10
Admitting: FAMILY MEDICINE

## 2019-07-10 DIAGNOSIS — G89.29 CHRONIC PAIN OF LEFT KNEE: ICD-10-CM

## 2019-07-10 DIAGNOSIS — Z12.39 ENCOUNTER FOR SCREENING FOR MALIGNANT NEOPLASM OF BREAST: ICD-10-CM

## 2019-07-10 DIAGNOSIS — M25.562 CHRONIC PAIN OF LEFT KNEE: ICD-10-CM

## 2019-07-10 PROCEDURE — 77067 SCR MAMMO BI INCL CAD: CPT

## 2019-07-10 PROCEDURE — 77063 BREAST TOMOSYNTHESIS BI: CPT

## 2019-07-10 PROCEDURE — 73562 X-RAY EXAM OF KNEE 3: CPT

## 2019-07-15 DIAGNOSIS — M17.12 PRIMARY OSTEOARTHRITIS OF LEFT KNEE: Primary | ICD-10-CM

## 2019-07-24 ENCOUNTER — OFFICE VISIT (OUTPATIENT)
Dept: ORTHOPEDIC SURGERY | Facility: CLINIC | Age: 74
End: 2019-07-24

## 2019-07-24 VITALS
HEIGHT: 65 IN | BODY MASS INDEX: 34.16 KG/M2 | SYSTOLIC BLOOD PRESSURE: 116 MMHG | WEIGHT: 205 LBS | DIASTOLIC BLOOD PRESSURE: 71 MMHG | HEART RATE: 60 BPM

## 2019-07-24 DIAGNOSIS — M17.12 PRIMARY OSTEOARTHRITIS OF LEFT KNEE: ICD-10-CM

## 2019-07-24 DIAGNOSIS — R52 PAIN: Primary | ICD-10-CM

## 2019-07-24 PROCEDURE — 99203 OFFICE O/P NEW LOW 30 MIN: CPT | Performed by: NURSE PRACTITIONER

## 2019-07-24 PROCEDURE — 20610 DRAIN/INJ JOINT/BURSA W/O US: CPT | Performed by: NURSE PRACTITIONER

## 2019-07-24 RX ADMIN — TRIAMCINOLONE ACETONIDE 80 MG: 40 INJECTION, SUSPENSION INTRA-ARTICULAR; INTRAMUSCULAR at 14:12

## 2019-07-24 RX ADMIN — LIDOCAINE HYDROCHLORIDE 8 ML: 10 INJECTION, SOLUTION EPIDURAL; INFILTRATION; INTRACAUDAL; PERINEURAL at 14:12

## 2019-07-24 NOTE — PROGRESS NOTES
Subjective:     Patient ID: Sandra Mane is a 74 y.o. female.    Chief Complaint:  Left knee pain  History of Present Illness  Sandra Mane is a 74-year-old female who presents to clinic with a 1 year history or more of pain at the left knee.  Maximal tenderness present the medial and lateral joint line greater at the lateral joint line as well as the anterior aspect of the left knee.  Denies known injury pain has been progressively getting worse over the last several years.  Rates discomfort at a 10 out of a 10 aching throbbing in nature positive for swelling.  Does feel as if the knee is in a give out on her that she cannot trust it however has not fallen secondary to the knee giving out on her.  Increased pain noted with transitional changes such as from seated to standing attempting to walk, ascending and descending steps, ambulating long distances, standing for long periods of time.  She has been seen by her primary care provider x-rays were completed are available in epic for viewing.  Mild symptom relief with rest and topical ice and again worsening of symptoms with all ambulatory activities.  Denies the pain radiating up into her left hip nor into the groin.  Denies presence of numbness or tingling radiating down the left lower extremity.  She does experience swelling just inferior to the knee which does extend down to the ankle worse at the left than the right.  Denies other concerns present at this time.    Social History     Occupational History   • Not on file   Tobacco Use   • Smoking status: Never Smoker   • Smokeless tobacco: Never Used   Substance and Sexual Activity   • Alcohol use: No   • Drug use: No   • Sexual activity: Defer      Past Medical History:   Diagnosis Date   • Anxiety    • Arthritis    • Back pain     SEES DR. BERRY FOR THIS   • Depression      Past Surgical History:   Procedure Laterality Date   • COLONOSCOPY     • JOINT REPLACEMENT     • REPLACEMENT TOTAL KNEE Right    • SPINAL  "CORD STIMULATOR IMPLANT  2011   • SPINAL CORD STIMULATOR IMPLANT         Family History   Problem Relation Age of Onset   • Cancer Father    • COPD Father    • Breast cancer Paternal Aunt          Review of Systems   Constitutional: Negative for chills, diaphoresis, fever and unexpected weight change.   HENT: Negative for hearing loss, nosebleeds, sore throat and tinnitus.    Eyes: Negative for pain and visual disturbance.   Respiratory: Negative for cough, shortness of breath and wheezing.    Cardiovascular: Negative for chest pain and palpitations.   Gastrointestinal: Negative for abdominal pain, diarrhea, nausea and vomiting.   Endocrine: Negative for cold intolerance, heat intolerance and polydipsia.   Genitourinary: Negative for difficulty urinating, dysuria and hematuria.   Musculoskeletal: Positive for arthralgias and myalgias. Negative for joint swelling.   Skin: Negative for rash and wound.   Allergic/Immunologic: Negative for environmental allergies.   Neurological: Negative for dizziness, syncope and numbness.   Hematological: Does not bruise/bleed easily.   Psychiatric/Behavioral: Negative for dysphoric mood and sleep disturbance. The patient is not nervous/anxious.            Objective:  Vitals:    07/24/19 1354   BP: 116/71   BP Location: Right arm   Patient Position: Sitting   Cuff Size: Large Adult   Pulse: 60   Weight: 93 kg (205 lb)   Height: 165.1 cm (65\")         07/24/19  1354   Weight: 93 kg (205 lb)     Body mass index is 34.11 kg/m².      Left Knee Exam     Tenderness   The patient is experiencing tenderness in the lateral joint line, medial joint line and patella.    Range of Motion   Extension: 0   Flexion: 120     Tests   Zonia:  Medial - negative Lateral - positive  Varus: negative Valgus: negative  Lachman:  Anterior - 1+    Posterior - negative  Drawer:  Anterior - negative       Patellar apprehension: positive    Other   Erythema: absent  Sensation: normal  Pulse: present  Swelling: " moderate    Comments:  Positive crepitus her arc of motion  Positive active patellar compression test  Negative logroll exam  Negative Stinchfield exam               Imaging:  Moderate Tricompartmental osteoarthritis appreciated with osteophyte formation noted at the lateral tibial plateau and lateral femoral condyle in the inferior and superior patellar poles  Assessment:        1. Pain    2. Primary osteoarthritis of left knee           Plan:  1.  Discussed plan of care with patient.  Wish to proceed with corticosteroid injection left knee.  Plan to see her back in approximately 6 weeks to reevaluate.  Patient verbalized understanding of all information agrees plan of care.  Denies other concerns present at this time.  Large Joint Arthrocentesis: L knee  Date/Time: 7/24/2019 2:12 PM  Consent given by: patient  Site marked: site marked  Timeout: Immediately prior to procedure a time out was called to verify the correct patient, procedure, equipment, support staff and site/side marked as required   Supporting Documentation  Indications: pain   Procedure Details  Location: knee - L knee  Preparation: Patient was prepped and draped in the usual sterile fashion  Needle size: 22 G  Approach: anterolateral  Medications administered: 8 mL lidocaine PF 1% 1 %; 80 mg triamcinolone acetonide 40 MG/ML  Patient tolerance: patient tolerated the procedure well with no immediate complications            Orders:  Orders Placed This Encounter   Procedures   • Large Joint Arthrocentesis: L knee       I ordered and reviewed the HIEN today.     Dictated utilizing Dragon dictation

## 2019-07-25 PROBLEM — M17.12 PRIMARY OSTEOARTHRITIS OF LEFT KNEE: Status: ACTIVE | Noted: 2019-07-25

## 2019-07-25 RX ORDER — LIDOCAINE HYDROCHLORIDE 10 MG/ML
8 INJECTION, SOLUTION EPIDURAL; INFILTRATION; INTRACAUDAL; PERINEURAL
Status: COMPLETED | OUTPATIENT
Start: 2019-07-24 | End: 2019-07-24

## 2019-07-25 RX ORDER — TRIAMCINOLONE ACETONIDE 40 MG/ML
80 INJECTION, SUSPENSION INTRA-ARTICULAR; INTRAMUSCULAR
Status: COMPLETED | OUTPATIENT
Start: 2019-07-24 | End: 2019-07-24

## 2019-07-26 DIAGNOSIS — F41.9 ANXIETY: ICD-10-CM

## 2019-07-29 DIAGNOSIS — F41.9 ANXIETY: ICD-10-CM

## 2019-07-29 RX ORDER — CLONAZEPAM 0.5 MG/1
TABLET ORAL
Qty: 60 TABLET | Refills: 2 | Status: SHIPPED | OUTPATIENT
Start: 2019-07-29 | End: 2019-07-29

## 2019-07-29 RX ORDER — CLONAZEPAM 0.5 MG/1
TABLET ORAL
Qty: 60 TABLET | Refills: 0 | Status: SHIPPED | OUTPATIENT
Start: 2019-07-29 | End: 2020-03-03

## 2019-09-04 ENCOUNTER — OFFICE VISIT (OUTPATIENT)
Dept: ORTHOPEDIC SURGERY | Facility: CLINIC | Age: 74
End: 2019-09-04

## 2019-09-04 VITALS — WEIGHT: 205 LBS | BODY MASS INDEX: 34.16 KG/M2 | HEIGHT: 65 IN

## 2019-09-04 DIAGNOSIS — M70.61 GREATER TROCHANTERIC BURSITIS OF BOTH HIPS: ICD-10-CM

## 2019-09-04 DIAGNOSIS — R52 PAIN: Primary | ICD-10-CM

## 2019-09-04 DIAGNOSIS — R60.9 DEPENDENT EDEMA: ICD-10-CM

## 2019-09-04 DIAGNOSIS — M70.62 GREATER TROCHANTERIC BURSITIS OF BOTH HIPS: ICD-10-CM

## 2019-09-04 DIAGNOSIS — M17.12 PRIMARY OSTEOARTHRITIS OF LEFT KNEE: ICD-10-CM

## 2019-09-04 PROCEDURE — 20610 DRAIN/INJ JOINT/BURSA W/O US: CPT | Performed by: NURSE PRACTITIONER

## 2019-09-04 PROCEDURE — 99213 OFFICE O/P EST LOW 20 MIN: CPT | Performed by: NURSE PRACTITIONER

## 2019-09-04 RX ORDER — TRIAMCINOLONE ACETONIDE 40 MG/ML
80 INJECTION, SUSPENSION INTRA-ARTICULAR; INTRAMUSCULAR
Status: COMPLETED | OUTPATIENT
Start: 2019-09-04 | End: 2019-09-04

## 2019-09-04 RX ORDER — LIDOCAINE HYDROCHLORIDE 10 MG/ML
4 INJECTION, SOLUTION EPIDURAL; INFILTRATION; INTRACAUDAL; PERINEURAL
Status: COMPLETED | OUTPATIENT
Start: 2019-09-04 | End: 2019-09-04

## 2019-09-04 RX ADMIN — LIDOCAINE HYDROCHLORIDE 4 ML: 10 INJECTION, SOLUTION EPIDURAL; INFILTRATION; INTRACAUDAL; PERINEURAL at 14:49

## 2019-09-04 RX ADMIN — TRIAMCINOLONE ACETONIDE 80 MG: 40 INJECTION, SUSPENSION INTRA-ARTICULAR; INTRAMUSCULAR at 14:49

## 2019-09-04 NOTE — PROGRESS NOTES
Subjective:     Patient ID: Sandra Mane is a 74 y.o. female.    Chief Complaint:  Follow-up primary osteoarthritis left knee  Bilateral hip pain, new issue to provider   History of Present Illness  Sandra Mane back to clinic with spouse for follow-up left knee.  She received corticosteroid injection left knee on 7/24/2019 and has received 100% symptom relief.  She was able to return to all previously tolerated activities because the pain was significantly decreased as a matter fact she thinks she has overdone the activity and now she is experiencing pain at the bilateral hips, lateral aspect.  Pain is not radiating to groin but does radiate inferiorly and posteriorly.  She is seen by pain management for her back however this is new issue and new pain.  Increased pain noted with ambulating, seated activities and attempting to sleep on the sides.  Pain is a pulling sensation again present the lateral aspect tenderness to touch.  Denies related numbness or tingling due to this.  She is currently undergoing nerve ablations with pain management.  Denies previously experiencing similar symptoms in the past.  Again pain is not radiating into her groin.  She does notice weakness and has pretty significant swelling greater at the r left lower extremity compared to that of the right.  States that the swelling did significantly decrease after receiving corticosteroid injection.  She has been encouraged by her primary care provider to purchase compression stockings which she has not yet done.  Denies other concerns present at this time.     Social History     Occupational History   • Not on file   Tobacco Use   • Smoking status: Never Smoker   • Smokeless tobacco: Never Used   Substance and Sexual Activity   • Alcohol use: No   • Drug use: No   • Sexual activity: Defer      Past Medical History:   Diagnosis Date   • Anxiety    • Arthritis    • Back pain     SEES DR. BERRY FOR THIS   • Depression      Past Surgical History:  "  Procedure Laterality Date   • COLONOSCOPY     • JOINT REPLACEMENT     • REPLACEMENT TOTAL KNEE Right    • SPINAL CORD STIMULATOR IMPLANT  2011   • SPINAL CORD STIMULATOR IMPLANT         Family History   Problem Relation Age of Onset   • Cancer Father    • COPD Father    • Breast cancer Paternal Aunt          Review of Systems   Constitutional: Negative for chills, diaphoresis, fever and unexpected weight change.   HENT: Negative for hearing loss, nosebleeds, sore throat and tinnitus.    Eyes: Negative for pain and visual disturbance.   Respiratory: Negative for cough, shortness of breath and wheezing.    Cardiovascular: Negative for chest pain and palpitations.   Gastrointestinal: Negative for abdominal pain, diarrhea, nausea and vomiting.   Endocrine: Negative for cold intolerance, heat intolerance and polydipsia.   Genitourinary: Negative for difficulty urinating, dysuria and hematuria.   Musculoskeletal: Positive for arthralgias and myalgias. Negative for joint swelling.   Skin: Negative for rash and wound.   Allergic/Immunologic: Negative for environmental allergies.   Neurological: Negative for dizziness, syncope and numbness.   Hematological: Does not bruise/bleed easily.   Psychiatric/Behavioral: Negative for dysphoric mood and sleep disturbance. The patient is not nervous/anxious.            Objective:  Physical Exam    General: No acute distress.  Eyes: conjunctiva clear; pupils equally round and reactive  ENT: external ears and nose atraumatic; oropharynx clear  CV: no peripheral edema  Resp: normal respiratory effort  Skin: no rashes or wounds; normal turgor  Psych: mood and affect appropriate; recent and remote memory intact    Vitals:    09/04/19 1427   Weight: 93 kg (205 lb)   Height: 165.1 cm (65\")         09/04/19  1427   Weight: 93 kg (205 lb)     Body mass index is 34.11 kg/m².      Right Hip Exam     Tenderness   The patient is experiencing tenderness in the greater trochanter.    Range of " Motion   Abduction: 45   Adduction: 20   Extension: 0   Flexion: 70     Muscle Strength   Abduction: 4/5   Adduction: 4/5     Comments:  Negative Stinchfield exam  Negative logroll exam      Left Hip Exam     Tenderness   The patient is experiencing tenderness in the greater trochanter.    Range of Motion   Abduction: 45   Adduction: 20   Extension: 0   Flexion: 70     Muscle Strength   Abduction: 4/5   Adduction: 4/5     Other   Erythema: absent  Scars: absent  Sensation: normal  Pulse: present    Comments:  Negative Stinchfield exam  Negative logroll exam           Assessment:        1. Pain    2. Dependent edema    3. Primary osteoarthritis of left knee    4. Greater trochanteric bursitis of both hips           Plan:  1.  Discussed plan of care with patient.  Wish to proceed with corticosteroid injections lateral aspect, greater trochanteric bursa bilateral hips.  We will plan to see her back in approximately 2 to 3 months as needed or if symptoms return at the near the hips.    2.  Discussed with patient and spouse recommend also the compression stockings bilateral lower extremities.  We will send in a prescription to have her current medical and encourage patient to call them with instructions for measurement in the morning.  Patient verbalized understanding of all information agrees with plan of care.  Denies other concerns present at this time.  Large Joint Arthrocentesis  Date/Time: 9/4/2019 2:49 PM  Consent given by: patient  Site marked: site marked  Timeout: Immediately prior to procedure a time out was called to verify the correct patient, procedure, equipment, support staff and site/side marked as required   Supporting Documentation  Indications: pain   Procedure Details  Location: hip (bilateral) - Hip joint: bilateral.  Preparation: Patient was prepped and draped in the usual sterile fashion  Needle size: 22 G  Approach: lateral  Medications administered: 4 mL lidocaine PF 1% 1 %; 80 mg triamcinolone  acetonide 40 MG/ML  Patient tolerance: patient tolerated the procedure well with no immediate complications        Orders:  Orders Placed This Encounter   Procedures   • Large Joint Arthrocentesis   • Compression Knee High Stockings     Dictated utilizing Dragon dictation

## 2019-10-16 DIAGNOSIS — F41.8 DEPRESSION WITH ANXIETY: ICD-10-CM

## 2019-10-16 RX ORDER — FLUOXETINE 20 MG/1
TABLET, FILM COATED ORAL
Qty: 30 TABLET | Refills: 0 | Status: SHIPPED | OUTPATIENT
Start: 2019-10-16 | End: 2019-11-16 | Stop reason: SDUPTHER

## 2019-10-24 ENCOUNTER — LAB (OUTPATIENT)
Dept: INTERNAL MEDICINE | Facility: CLINIC | Age: 74
End: 2019-10-24

## 2019-10-24 DIAGNOSIS — N18.30 CKD (CHRONIC KIDNEY DISEASE) STAGE 3, GFR 30-59 ML/MIN (HCC): ICD-10-CM

## 2019-10-24 DIAGNOSIS — E78.5 HYPERLIPIDEMIA, UNSPECIFIED HYPERLIPIDEMIA TYPE: ICD-10-CM

## 2019-10-25 LAB
ALBUMIN SERPL-MCNC: 4.4 G/DL (ref 3.5–5.2)
ALBUMIN/GLOB SERPL: 1.8 G/DL
ALP SERPL-CCNC: 77 U/L (ref 39–117)
ALT SERPL-CCNC: 14 U/L (ref 1–33)
AST SERPL-CCNC: 19 U/L (ref 1–32)
BASOPHILS # BLD AUTO: 0.02 10*3/MM3 (ref 0–0.2)
BASOPHILS NFR BLD AUTO: 0.3 % (ref 0–1.5)
BILIRUB SERPL-MCNC: 0.5 MG/DL (ref 0.2–1.2)
BUN SERPL-MCNC: 19 MG/DL (ref 8–23)
BUN/CREAT SERPL: 17.9 (ref 7–25)
CALCIUM SERPL-MCNC: 9 MG/DL (ref 8.6–10.5)
CHLORIDE SERPL-SCNC: 98 MMOL/L (ref 98–107)
CHOLEST SERPL-MCNC: 224 MG/DL (ref 0–200)
CHOLEST/HDLC SERPL: 3.2 {RATIO}
CO2 SERPL-SCNC: 27.3 MMOL/L (ref 22–29)
CREAT SERPL-MCNC: 1.06 MG/DL (ref 0.57–1)
EOSINOPHIL # BLD AUTO: 0.11 10*3/MM3 (ref 0–0.4)
EOSINOPHIL NFR BLD AUTO: 1.7 % (ref 0.3–6.2)
ERYTHROCYTE [DISTWIDTH] IN BLOOD BY AUTOMATED COUNT: 15.5 % (ref 12.3–15.4)
GLOBULIN SER CALC-MCNC: 2.5 GM/DL
GLUCOSE SERPL-MCNC: 91 MG/DL (ref 65–99)
HCT VFR BLD AUTO: 30 % (ref 34–46.6)
HDLC SERPL-MCNC: 70 MG/DL (ref 40–60)
HGB BLD-MCNC: 10.1 G/DL (ref 12–15.9)
IMM GRANULOCYTES # BLD AUTO: 0.04 10*3/MM3 (ref 0–0.05)
IMM GRANULOCYTES NFR BLD AUTO: 0.6 % (ref 0–0.5)
LDLC SERPL CALC-MCNC: 134 MG/DL (ref 0–100)
LYMPHOCYTES # BLD AUTO: 1.58 10*3/MM3 (ref 0.7–3.1)
LYMPHOCYTES NFR BLD AUTO: 25 % (ref 19.6–45.3)
MCH RBC QN AUTO: 32.1 PG (ref 26.6–33)
MCHC RBC AUTO-ENTMCNC: 33.7 G/DL (ref 31.5–35.7)
MCV RBC AUTO: 95.2 FL (ref 79–97)
MONOCYTES # BLD AUTO: 0.53 10*3/MM3 (ref 0.1–0.9)
MONOCYTES NFR BLD AUTO: 8.4 % (ref 5–12)
NEUTROPHILS # BLD AUTO: 4.04 10*3/MM3 (ref 1.7–7)
NEUTROPHILS NFR BLD AUTO: 64 % (ref 42.7–76)
NRBC BLD AUTO-RTO: 0 /100 WBC (ref 0–0.2)
PLATELET # BLD AUTO: 235 10*3/MM3 (ref 140–450)
POTASSIUM SERPL-SCNC: 4.5 MMOL/L (ref 3.5–5.2)
PROT SERPL-MCNC: 6.9 G/DL (ref 6–8.5)
RBC # BLD AUTO: 3.15 10*6/MM3 (ref 3.77–5.28)
SODIUM SERPL-SCNC: 138 MMOL/L (ref 136–145)
TRIGL SERPL-MCNC: 99 MG/DL (ref 0–150)
TSH SERPL DL<=0.005 MIU/L-ACNC: 2.42 UIU/ML (ref 0.27–4.2)
VLDLC SERPL CALC-MCNC: 19.8 MG/DL
WBC # BLD AUTO: 6.32 10*3/MM3 (ref 3.4–10.8)

## 2019-10-31 ENCOUNTER — OFFICE VISIT (OUTPATIENT)
Dept: INTERNAL MEDICINE | Facility: CLINIC | Age: 74
End: 2019-10-31

## 2019-10-31 VITALS
OXYGEN SATURATION: 96 % | SYSTOLIC BLOOD PRESSURE: 144 MMHG | TEMPERATURE: 98.3 F | HEIGHT: 65 IN | RESPIRATION RATE: 16 BRPM | BODY MASS INDEX: 35.99 KG/M2 | DIASTOLIC BLOOD PRESSURE: 80 MMHG | WEIGHT: 216 LBS | HEART RATE: 73 BPM

## 2019-10-31 DIAGNOSIS — M17.12 PRIMARY OSTEOARTHRITIS OF LEFT KNEE: ICD-10-CM

## 2019-10-31 DIAGNOSIS — Z00.00 ROUTINE HEALTH MAINTENANCE: ICD-10-CM

## 2019-10-31 DIAGNOSIS — D64.9 ANEMIA, UNSPECIFIED TYPE: ICD-10-CM

## 2019-10-31 DIAGNOSIS — E78.5 HYPERLIPIDEMIA, UNSPECIFIED HYPERLIPIDEMIA TYPE: Primary | ICD-10-CM

## 2019-10-31 DIAGNOSIS — F41.8 DEPRESSION WITH ANXIETY: ICD-10-CM

## 2019-10-31 DIAGNOSIS — G30.1 LATE ONSET ALZHEIMER'S DISEASE WITHOUT BEHAVIORAL DISTURBANCE (HCC): ICD-10-CM

## 2019-10-31 DIAGNOSIS — G89.29 CHRONIC MIDLINE LOW BACK PAIN, UNSPECIFIED WHETHER SCIATICA PRESENT: ICD-10-CM

## 2019-10-31 DIAGNOSIS — R60.9 DEPENDENT EDEMA: ICD-10-CM

## 2019-10-31 DIAGNOSIS — F02.80 LATE ONSET ALZHEIMER'S DISEASE WITHOUT BEHAVIORAL DISTURBANCE (HCC): ICD-10-CM

## 2019-10-31 DIAGNOSIS — M54.50 CHRONIC MIDLINE LOW BACK PAIN, UNSPECIFIED WHETHER SCIATICA PRESENT: ICD-10-CM

## 2019-10-31 DIAGNOSIS — N18.30 CKD (CHRONIC KIDNEY DISEASE) STAGE 3, GFR 30-59 ML/MIN (HCC): ICD-10-CM

## 2019-10-31 DIAGNOSIS — K21.9 GASTROESOPHAGEAL REFLUX DISEASE WITHOUT ESOPHAGITIS: ICD-10-CM

## 2019-10-31 PROCEDURE — 99214 OFFICE O/P EST MOD 30 MIN: CPT | Performed by: FAMILY MEDICINE

## 2019-10-31 NOTE — PROGRESS NOTES
Subjective     Sandra Mane is a 74 y.o. female, who presents with a chief complaint of   Chief Complaint   Patient presents with   • Anemia   • Chronic Kidney Disease   • Hyperlipidemia   • Depression   • Memory Loss     Hyperlipidemia     Anemia     Chronic Kidney Disease   Associated symptoms include arthralgias.   Knee Pain      Depression Patient is not experiencing: nervousness/anxiety.    Memory Loss   Associated symptoms include arthralgias.     1. Dementia.  Pt takes memantine.    2. Depression with anxiety.  Pt states she is doing well with fluoxetine.  Denies SI.    3. Chronic back pain.  She sees Dr. Rea, who does epidural injections.  She has an MRI pending tomorrow.    4. Left knee OA.  She had an injection by DINORAH Hauser.      The following portions of the patient's history were reviewed and updated as appropriate: allergies, current medications, past family history, past medical history, past social history, past surgical history and problem list.    Allergies: Patient has no known allergies.    Review of Systems   Constitutional: Negative.    HENT: Negative.    Eyes: Negative.    Cardiovascular: Positive for leg swelling.   Gastrointestinal: Negative.    Endocrine: Negative.    Genitourinary: Negative.    Musculoskeletal: Positive for arthralgias and back pain.   Allergic/Immunologic: Negative.    Neurological: Negative.    Hematological: Negative.    Psychiatric/Behavioral: The patient is not nervous/anxious.      Objective     Wt Readings from Last 3 Encounters:   10/31/19 98 kg (216 lb)   09/04/19 93 kg (205 lb)   07/24/19 93 kg (205 lb)     Temp Readings from Last 3 Encounters:   10/31/19 98.3 °F (36.8 °C) (Oral)   06/27/19 98.3 °F (36.8 °C) (Oral)   02/28/19 98.6 °F (37 °C)     BP Readings from Last 3 Encounters:   10/31/19 144/80   07/24/19 116/71   06/27/19 110/72     Pulse Readings from Last 3 Encounters:   10/31/19 73   07/24/19 60   06/27/19 69     Body mass index is 35.94  kg/m².  SpO2 Readings from Last 3 Encounters:   02/26/18 96%   12/04/17 97%   10/10/17 95%       Physical Exam   Constitutional: She is oriented to person, place, and time. She appears well-developed and well-nourished.   Sitting in wheelchair.   HENT:   Head: Normocephalic and atraumatic.   Eyes: Conjunctivae and EOM are normal.   Neck: Neck supple.   Cardiovascular: Normal rate, regular rhythm and normal heart sounds. Exam reveals no gallop and no friction rub.   No murmur heard.  Pulmonary/Chest: Effort normal and breath sounds normal. No respiratory distress. She has no wheezes. She has no rales.   Abdominal: Soft. Bowel sounds are normal. There is no hepatosplenomegaly.   Musculoskeletal: She exhibits edema. She exhibits no deformity.        Left knee: She exhibits normal range of motion and no swelling. Tenderness found. Medial joint line tenderness noted.   Back not examined.   Lymphadenopathy:     She has no cervical adenopathy.   Neurological: She is alert and oriented to person, place, and time.   Skin: Skin is warm and dry. No rash noted.   Psychiatric: She has a normal mood and affect. Her behavior is normal.   Nursing note and vitals reviewed.    Results for orders placed or performed in visit on 10/24/19   Lipid Panel With / Chol / HDL Ratio   Result Value Ref Range    Total Cholesterol 224 (H) 0 - 200 mg/dL    Triglycerides 99 0 - 150 mg/dL    HDL Cholesterol 70 (H) 40 - 60 mg/dL    VLDL Cholesterol 19.8 mg/dL    LDL Cholesterol  134 (H) 0 - 100 mg/dL    Chol/HDL Ratio 3.20    TSH   Result Value Ref Range    TSH 2.420 0.270 - 4.200 uIU/mL   Comprehensive Metabolic Panel   Result Value Ref Range    Glucose 91 65 - 99 mg/dL    BUN 19 8 - 23 mg/dL    Creatinine 1.06 (H) 0.57 - 1.00 mg/dL    eGFR Non African Am 51 (L) >60 mL/min/1.73    eGFR African Am 62 >60 mL/min/1.73    BUN/Creatinine Ratio 17.9 7.0 - 25.0    Sodium 138 136 - 145 mmol/L    Potassium 4.5 3.5 - 5.2 mmol/L    Chloride 98 98 - 107 mmol/L     Total CO2 27.3 22.0 - 29.0 mmol/L    Calcium 9.0 8.6 - 10.5 mg/dL    Total Protein 6.9 6.0 - 8.5 g/dL    Albumin 4.40 3.50 - 5.20 g/dL    Globulin 2.5 gm/dL    A/G Ratio 1.8 g/dL    Total Bilirubin 0.5 0.2 - 1.2 mg/dL    Alkaline Phosphatase 77 39 - 117 U/L    AST (SGOT) 19 1 - 32 U/L    ALT (SGPT) 14 1 - 33 U/L   CBC & Differential   Result Value Ref Range    WBC 6.32 3.40 - 10.80 10*3/mm3    RBC 3.15 (L) 3.77 - 5.28 10*6/mm3    Hemoglobin 10.1 (L) 12.0 - 15.9 g/dL    Hematocrit 30.0 (L) 34.0 - 46.6 %    MCV 95.2 79.0 - 97.0 fL    MCH 32.1 26.6 - 33.0 pg    MCHC 33.7 31.5 - 35.7 g/dL    RDW 15.5 (H) 12.3 - 15.4 %    Platelets 235 140 - 450 10*3/mm3    Neutrophil Rel % 64.0 42.7 - 76.0 %    Lymphocyte Rel % 25.0 19.6 - 45.3 %    Monocyte Rel % 8.4 5.0 - 12.0 %    Eosinophil Rel % 1.7 0.3 - 6.2 %    Basophil Rel % 0.3 0.0 - 1.5 %    Neutrophils Absolute 4.04 1.70 - 7.00 10*3/mm3    Lymphocytes Absolute 1.58 0.70 - 3.10 10*3/mm3    Monocytes Absolute 0.53 0.10 - 0.90 10*3/mm3    Eosinophils Absolute 0.11 0.00 - 0.40 10*3/mm3    Basophils Absolute 0.02 0.00 - 0.20 10*3/mm3    Immature Granulocyte Rel % 0.6 (H) 0.0 - 0.5 %    Immature Grans Absolute 0.04 0.00 - 0.05 10*3/mm3    nRBC 0.0 0.0 - 0.2 /100 WBC       Assessment/Plan   Sandra was seen today for anemia, chronic kidney disease, hyperlipidemia, depression and memory loss.    Diagnoses and all orders for this visit:    Hyperlipidemia, unspecified hyperlipidemia type  -     Comprehensive Metabolic Panel; Future  -     Lipid Panel With / Chol / HDL Ratio; Future  -     TSH; Future    CKD (chronic kidney disease) stage 3, GFR 30-59 ml/min (CMS/[facility])    Depression with anxiety    Chronic midline low back pain, unspecified whether sciatica present    Anemia, unspecified type  -     CBC & Differential; Future    Late onset Alzheimer's disease without behavioral disturbance (CMS/[facility])    Gastroesophageal reflux disease without esophagitis    Dependent edema    Primary  osteoarthritis of left knee    Routine health maintenance  -     Hepatitis C Antibody; Future    1. Hyperlipidemia.  Mild.  Controlled with lifestyle measures.    2. CKDIII.  Stable.  Labs reviewed.  Avoid NSAIDs.    3. Depression with anxiety.  Controlled with fluoxetine.    4. Chronic back pain.  Stable.  Per pain management.  Dr. Rea.    5. Anemia.  Chronic.  Stable.  Ferritin, B12, folate normal.     6. Alzheimer's.  She has had neuropsych testing and neurology consult.  Dr. Cole's note and recommendations were reviewed.  Continue memantine.    8. GERD. Controlled with omeprazole prn.    9. Dependent edema.  Continue furosemide 20 mg prn with KCl.  Compression stockings declined.  Elevated the legs.    10. Left knee OA.  Injections per ortho.    11. Routine health maint.  They received the Cologuard in the mail but haven't sent it in.  Mammogram UTD.  Flu, Pneumovax, Tdap.      Outpatient Medications Prior to Visit   Medication Sig Dispense Refill   • betamethasone dipropionate (DIPROLENE) 0.05 % cream Apply  topically to the appropriate area as directed 2 (Two) Times a Day. Apply to affected area BID 30 g 0   • clonazePAM (KlonoPIN) 0.5 MG tablet TAKE TWO TABLETS BY MOUTH EVERY NIGHT AT BEDTIME AS NEEDED 60 tablet 0   • FLUoxetine (PROzac) 20 MG tablet TAKE ONE TABLET BY MOUTH DAILY 30 tablet 0   • fluticasone (FLONASE) 50 MCG/ACT nasal spray 2 sprays into the nostril(s) as directed by provider Daily.     • furosemide (LASIX) 20 MG tablet TAKE ONE TABLET BY MOUTH DAILY 90 tablet 2   • gabapentin (NEURONTIN) 400 MG capsule Take 400 mg by mouth every 4 (four) hours.     • memantine (NAMENDA) 10 MG tablet Take 1 tablet by mouth Daily. 90 tablet 1   • Morphine (MS CONTIN) 60 MG 12 hr tablet Take 100 mg by mouth 2 (Two) Times a Day.     • Morphine (MSIR) 15 MG tablet Take 15 mg by mouth 2 (Two) Times a Day.     • omeprazole (priLOSEC) 20 MG capsule Take 20 mg by mouth.     • potassium chloride (K-DUR) 10 MEQ CR  tablet TAKE ONE TABLET BY MOUTH DAILY 90 tablet 2   • sulfamethoxazole-trimethoprim (BACTRIM DS,SEPTRA DS) 800-160 MG per tablet Take 1 tablet by mouth 2 (Two) Times a Day. 20 tablet 0     No facility-administered medications prior to visit.      No orders of the defined types were placed in this encounter.    [unfilled]  There are no discontinued medications.    Return in about 6 months (around 4/30/2020).

## 2019-11-16 DIAGNOSIS — F41.8 DEPRESSION WITH ANXIETY: ICD-10-CM

## 2019-11-18 ENCOUNTER — HOSPITAL ENCOUNTER (OUTPATIENT)
Dept: GENERAL RADIOLOGY | Facility: HOSPITAL | Age: 74
Discharge: HOME | End: 2019-11-18
Admitting: PAIN MEDICINE

## 2019-11-18 ENCOUNTER — TRANSCRIBE ORDERS (OUTPATIENT)
Dept: ADMINISTRATIVE | Facility: HOSPITAL | Age: 74
End: 2019-11-18

## 2019-11-18 DIAGNOSIS — M47.16 SPONDYLOSIS WITH MYELOPATHY, LUMBAR REGION: Primary | ICD-10-CM

## 2019-11-18 DIAGNOSIS — M54.16 LUMBAR RADICULOPATHY: ICD-10-CM

## 2019-11-18 DIAGNOSIS — M48.062 PSEUDOCLAUDICATION SYNDROME: ICD-10-CM

## 2019-11-18 DIAGNOSIS — M48.061 SPINAL STENOSIS, LUMBAR REGION, WITHOUT NEUROGENIC CLAUDICATION: ICD-10-CM

## 2019-11-18 DIAGNOSIS — M47.16 SPONDYLOSIS WITH MYELOPATHY, LUMBAR REGION: ICD-10-CM

## 2019-11-18 DIAGNOSIS — M47.816 LUMBAR SPONDYLOSIS: ICD-10-CM

## 2019-11-18 PROCEDURE — 72100 X-RAY EXAM L-S SPINE 2/3 VWS: CPT

## 2019-11-18 RX ORDER — FLUOXETINE 20 MG/1
TABLET, FILM COATED ORAL
Qty: 30 TABLET | Refills: 0 | Status: SHIPPED | OUTPATIENT
Start: 2019-11-18 | End: 2019-12-16 | Stop reason: SDUPTHER

## 2019-11-23 DIAGNOSIS — F41.9 ANXIETY: ICD-10-CM

## 2019-11-25 DIAGNOSIS — F41.9 ANXIETY: ICD-10-CM

## 2019-11-25 RX ORDER — CLONAZEPAM 0.5 MG/1
TABLET ORAL
Qty: 60 TABLET | Refills: 1 | Status: SHIPPED | OUTPATIENT
Start: 2019-11-25 | End: 2020-01-23

## 2019-11-27 RX ORDER — CLONAZEPAM 0.5 MG/1
TABLET ORAL
Qty: 60 TABLET | Refills: 1 | Status: SHIPPED | OUTPATIENT
Start: 2019-11-27 | End: 2020-03-03

## 2019-12-03 ENCOUNTER — OFFICE VISIT (OUTPATIENT)
Dept: ORTHOPEDIC SURGERY | Facility: CLINIC | Age: 74
End: 2019-12-03

## 2019-12-03 VITALS — WEIGHT: 216 LBS | BODY MASS INDEX: 35.99 KG/M2 | HEIGHT: 65 IN

## 2019-12-03 DIAGNOSIS — M70.61 GREATER TROCHANTERIC BURSITIS OF BOTH HIPS: ICD-10-CM

## 2019-12-03 DIAGNOSIS — R52 PAIN: Primary | ICD-10-CM

## 2019-12-03 DIAGNOSIS — L97.921 NON-HEALING ULCER OF LOWER LEG, LEFT, LIMITED TO BREAKDOWN OF SKIN (HCC): ICD-10-CM

## 2019-12-03 DIAGNOSIS — M70.62 GREATER TROCHANTERIC BURSITIS OF BOTH HIPS: ICD-10-CM

## 2019-12-03 DIAGNOSIS — M17.12 PRIMARY OSTEOARTHRITIS OF LEFT KNEE: ICD-10-CM

## 2019-12-03 PROCEDURE — 99214 OFFICE O/P EST MOD 30 MIN: CPT | Performed by: NURSE PRACTITIONER

## 2019-12-03 PROCEDURE — 20610 DRAIN/INJ JOINT/BURSA W/O US: CPT | Performed by: NURSE PRACTITIONER

## 2019-12-03 PROCEDURE — 73521 X-RAY EXAM HIPS BI 2 VIEWS: CPT | Performed by: NURSE PRACTITIONER

## 2019-12-03 RX ADMIN — TRIAMCINOLONE ACETONIDE 80 MG: 40 INJECTION, SUSPENSION INTRA-ARTICULAR; INTRAMUSCULAR at 15:35

## 2019-12-03 RX ADMIN — LIDOCAINE HYDROCHLORIDE 4 ML: 10 INJECTION, SOLUTION EPIDURAL; INFILTRATION; INTRACAUDAL; PERINEURAL at 15:36

## 2019-12-03 RX ADMIN — LIDOCAINE HYDROCHLORIDE 8 ML: 10 INJECTION, SOLUTION EPIDURAL; INFILTRATION; INTRACAUDAL; PERINEURAL at 15:35

## 2019-12-03 RX ADMIN — TRIAMCINOLONE ACETONIDE 80 MG: 40 INJECTION, SUSPENSION INTRA-ARTICULAR; INTRAMUSCULAR at 15:36

## 2019-12-03 NOTE — PROGRESS NOTES
Subjective:     Patient ID: Sandra Mane is a 74 y.o. female.    Chief Complaint:  Follow-up primary osteoarthritis left knee  Greater trochanteric bursitis bilateral hips, follow-up  History of Present Illness  Sandra Mane returns to clinic for follow-up bilateral hip pain left knee pain.  Received corticosteroid injections last visit 9/4/2019 with significant symptom relief.  Maximal tenderness continues to be present lateral aspects of hips denies pain radiating into her groin as well as the left knee.  Maximal tenderness left knee medial and lateral joint line as well as anterior aspect.  Increased pain noted transitional activities such as from seated standing attempting to walk, ambulating long distances or standing for extended periods of time.  She does not ambulate to far however is experiencing pain in bilateral hips and knees.  Received corticosteroid injection in July for the left knee and has done extremely well since that time.  She does have concerns of an ulcer at the left lower extremity, anterior aspect that is not healing.  She was hit by a car door tore the skin and has continued to drain a clear yellow fluid followed by redness of the skin present for the last month.  She has not sought care from primary care provider she is continued applying bandages which has slowed down the fluid from leaking out of the ulcer but is not healing.  She was encouraged last visit to purchase compression stockings however is unable to wear secondary to the swelling she is experiencing left lower extremity.  Denies other concerns present this time.       Social History     Occupational History   • Not on file   Tobacco Use   • Smoking status: Never Smoker   • Smokeless tobacco: Never Used   Substance and Sexual Activity   • Alcohol use: No   • Drug use: No   • Sexual activity: Defer      Past Medical History:   Diagnosis Date   • Anxiety    • Arthritis    • Back pain     SEES DR. BERRY FOR THIS   • Depression   "    Past Surgical History:   Procedure Laterality Date   • COLONOSCOPY     • JOINT REPLACEMENT     • REPLACEMENT TOTAL KNEE Right    • SPINAL CORD STIMULATOR IMPLANT  2011   • SPINAL CORD STIMULATOR IMPLANT         Family History   Problem Relation Age of Onset   • Cancer Father    • COPD Father    • Breast cancer Paternal Aunt          Review of Systems   Constitutional: Negative for chills, diaphoresis, fever and unexpected weight change.   HENT: Negative for hearing loss, nosebleeds, sore throat and tinnitus.    Eyes: Negative for pain and visual disturbance.   Respiratory: Negative for cough, shortness of breath and wheezing.    Cardiovascular: Negative for chest pain and palpitations.   Gastrointestinal: Negative for abdominal pain, diarrhea, nausea and vomiting.   Endocrine: Negative for cold intolerance, heat intolerance and polydipsia.   Genitourinary: Negative for difficulty urinating, dysuria and hematuria.   Musculoskeletal: Positive for arthralgias and myalgias. Negative for joint swelling.   Skin: Negative for rash and wound.   Allergic/Immunologic: Negative for environmental allergies.   Neurological: Negative for dizziness, syncope and numbness.   Hematological: Does not bruise/bleed easily.   Psychiatric/Behavioral: Negative for dysphoric mood and sleep disturbance. The patient is not nervous/anxious.            Objective:  Physical Exam    General: No acute distress.  Eyes: conjunctiva clear; pupils equally round and reactive  ENT: external ears and nose atraumatic; oropharynx clear  CV: no peripheral edema  Resp: normal respiratory effort  Skin: no rashes or wounds; normal turgor  Psych: mood and affect appropriate; recent and remote memory intact    Vitals:    12/03/19 1428   Weight: 98 kg (216 lb)   Height: 165.1 cm (65\")         12/03/19  1428   Weight: 98 kg (216 lb)     Body mass index is 35.94 kg/m².      Ortho Exam          Right Hip Exam      Tenderness   The patient is experiencing " tenderness in the greater trochanter.     Range of Motion   Abduction: 45   Adduction: 20   Extension: 0   Flexion: 60      Muscle Strength   Abduction: 4/5   Adduction: 4/5      Comments:  Negative Stinchfield exam  Negative logroll exam     Left Hip Exam      Tenderness   The patient is experiencing tenderness in the greater trochanter.     Range of Motion   Abduction: 45   Adduction: 20   Extension: 0   Flexion: 60      Muscle Strength   Abduction: 4/5   Adduction: 4/5      Other   Erythema: absent  Scars: absent  Sensation: normal  Pulse: present     Comments:    Negative Stinchfield exam  Negative logroll exam  Left Knee Exam      Tenderness   The patient is experiencing tenderness in the lateral joint line, medial joint line and patella.     Range of Motion   Extension: 0   Flexion: 120      Tests   Zonia:  Medial - negative Lateral - positive  Varus: negative Valgus: negative  Lachman:  Anterior - 1+    Posterior - negative  Drawer:  Anterior - negative       Patellar apprehension: positive     Other   Erythema: absent  Sensation: normal  Pulse: present  Swelling: moderate     Comments:    Positive crepitus her arc of motion  Positive active patellar compression test  Negative logroll exam  Negative Stinchfield exam  Multiple ulcer wounds present anterior tibia, midshaft with serous drainage, erythema lower extremity, appears eschar tissue at 1 of the ulcerations    Imaging:  Bilateral Hip X-Ray  Indication: Pain  AP and Frog Leg views    Findings:  No fracture  Mild hip arthrosis, greater left than right  Normal soft tissues  Normal joint spaces    No prior studies were available for comparison.    Assessment:        1. Pain    2. Non-healing ulcer of lower leg, left, limited to breakdown of skin (CMS/HCC)    3. Greater trochanteric bursitis of both hips           Plan:  1.  Discussed plan of care with patient.  Wish to proceed with corticosteroid injection left knee and bilateral hips.  2.  I do  recommend she follow-up wound care nonhealing wounds left lower extremity along with significant swelling.  Plan to follow-up with her in 3 months as needed.  She verbalized understanding of all information agrees with plan of care.  Denies other concerns present this time.  Large Joint Arthrocentesis: L knee  Date/Time: 12/3/2019 3:35 PM  Consent given by: patient  Site marked: site marked  Timeout: Immediately prior to procedure a time out was called to verify the correct patient, procedure, equipment, support staff and site/side marked as required   Supporting Documentation  Indications: pain   Procedure Details  Location: knee - L knee  Preparation: Patient was prepped and draped in the usual sterile fashion  Needle size: 22 G  Approach: anterolateral  Medications administered: 8 mL lidocaine PF 1% 1 %; 80 mg triamcinolone acetonide 40 MG/ML  Patient tolerance: patient tolerated the procedure well with no immediate complications    Large Joint Arthrocentesis  Date/Time: 12/3/2019 3:36 PM  Procedure Details  Location: hip (bilateral) - Hip joint: bilateral.  Preparation: Patient was prepped and draped in the usual sterile fashion  Needle size: 22 G  Approach: posterior (lateral)  Medications administered: 4 mL lidocaine PF 1% 1 %; 80 mg triamcinolone acetonide 40 MG/ML  Patient tolerance: patient tolerated the procedure well with no immediate complications        Orders:  Orders Placed This Encounter   Procedures   • Large Joint Arthrocentesis: L knee   • Large Joint Arthrocentesis   • XR Hips Bilateral With or Without Pelvis 2 View   • Ambulatory Referral to Wound Clinic         I ordered and reviewed the HIEN today.     Dictated utilizing Dragon dictation

## 2019-12-09 ENCOUNTER — LAB REQUISITION (OUTPATIENT)
Dept: LAB | Facility: HOSPITAL | Age: 74
End: 2019-12-09

## 2019-12-09 ENCOUNTER — OFFICE VISIT (OUTPATIENT)
Dept: WOUND CARE | Facility: HOSPITAL | Age: 74
End: 2019-12-09

## 2019-12-09 DIAGNOSIS — Z00.00 ENCOUNTER FOR GENERAL ADULT MEDICAL EXAMINATION WITHOUT ABNORMAL FINDINGS: ICD-10-CM

## 2019-12-09 PROCEDURE — 87186 SC STD MICRODIL/AGAR DIL: CPT | Performed by: NURSE PRACTITIONER

## 2019-12-09 PROCEDURE — 87070 CULTURE OTHR SPECIMN AEROBIC: CPT | Performed by: NURSE PRACTITIONER

## 2019-12-09 PROCEDURE — 87205 SMEAR GRAM STAIN: CPT | Performed by: NURSE PRACTITIONER

## 2019-12-09 PROCEDURE — 87147 CULTURE TYPE IMMUNOLOGIC: CPT | Performed by: NURSE PRACTITIONER

## 2019-12-09 PROCEDURE — 87075 CULTR BACTERIA EXCEPT BLOOD: CPT | Performed by: NURSE PRACTITIONER

## 2019-12-09 PROCEDURE — G0463 HOSPITAL OUTPT CLINIC VISIT: HCPCS

## 2019-12-09 RX ORDER — TRIAMCINOLONE ACETONIDE 40 MG/ML
80 INJECTION, SUSPENSION INTRA-ARTICULAR; INTRAMUSCULAR
Status: COMPLETED | OUTPATIENT
Start: 2019-12-03 | End: 2019-12-03

## 2019-12-09 RX ORDER — LIDOCAINE HYDROCHLORIDE 10 MG/ML
8 INJECTION, SOLUTION EPIDURAL; INFILTRATION; INTRACAUDAL; PERINEURAL
Status: COMPLETED | OUTPATIENT
Start: 2019-12-03 | End: 2019-12-03

## 2019-12-09 RX ORDER — LIDOCAINE HYDROCHLORIDE 10 MG/ML
4 INJECTION, SOLUTION EPIDURAL; INFILTRATION; INTRACAUDAL; PERINEURAL
Status: COMPLETED | OUTPATIENT
Start: 2019-12-03 | End: 2019-12-03

## 2019-12-09 RX ORDER — MEMANTINE HYDROCHLORIDE 10 MG/1
TABLET ORAL
Qty: 90 TABLET | Refills: 0 | Status: SHIPPED | OUTPATIENT
Start: 2019-12-09 | End: 2020-06-04

## 2019-12-11 LAB
BACTERIA SPEC AEROBE CULT: ABNORMAL
GRAM STN SPEC: ABNORMAL
GRAM STN SPEC: ABNORMAL

## 2019-12-11 RX ORDER — MEMANTINE HYDROCHLORIDE 10 MG/1
TABLET ORAL
Qty: 90 TABLET | Refills: 0 | Status: SHIPPED | OUTPATIENT
Start: 2019-12-11 | End: 2020-03-03

## 2019-12-14 LAB — BACTERIA SPEC ANAEROBE CULT: NORMAL

## 2019-12-16 ENCOUNTER — OFFICE VISIT (OUTPATIENT)
Dept: WOUND CARE | Facility: HOSPITAL | Age: 74
End: 2019-12-16

## 2019-12-16 DIAGNOSIS — F41.8 DEPRESSION WITH ANXIETY: ICD-10-CM

## 2019-12-16 RX ORDER — FLUOXETINE 20 MG/1
TABLET, FILM COATED ORAL
Qty: 30 TABLET | Refills: 5 | Status: SHIPPED | OUTPATIENT
Start: 2019-12-16 | End: 2020-07-28

## 2020-01-01 ENCOUNTER — OFFICE VISIT (OUTPATIENT)
Dept: ORTHOPEDIC SURGERY | Facility: CLINIC | Age: 75
End: 2020-01-01

## 2020-01-01 VITALS — HEIGHT: 55 IN | WEIGHT: 177 LBS | BODY MASS INDEX: 40.96 KG/M2

## 2020-01-01 DIAGNOSIS — F41.9 ANXIETY: ICD-10-CM

## 2020-01-01 DIAGNOSIS — M19.012 PRIMARY OSTEOARTHRITIS, LEFT SHOULDER: ICD-10-CM

## 2020-01-01 DIAGNOSIS — M17.12 PRIMARY OSTEOARTHRITIS OF LEFT KNEE: ICD-10-CM

## 2020-01-01 DIAGNOSIS — M75.122 COMPLETE TEAR OF LEFT ROTATOR CUFF, UNSPECIFIED WHETHER TRAUMATIC: ICD-10-CM

## 2020-01-01 DIAGNOSIS — M25.562 ACUTE PAIN OF LEFT KNEE: Primary | ICD-10-CM

## 2020-01-01 DIAGNOSIS — R60.9 DEPENDENT EDEMA: ICD-10-CM

## 2020-01-01 PROCEDURE — 99214 OFFICE O/P EST MOD 30 MIN: CPT | Performed by: NURSE PRACTITIONER

## 2020-01-01 PROCEDURE — 20610 DRAIN/INJ JOINT/BURSA W/O US: CPT | Performed by: NURSE PRACTITIONER

## 2020-01-01 PROCEDURE — 73562 X-RAY EXAM OF KNEE 3: CPT | Performed by: NURSE PRACTITIONER

## 2020-01-01 RX ORDER — LIDOCAINE HYDROCHLORIDE 10 MG/ML
4 INJECTION, SOLUTION EPIDURAL; INFILTRATION; INTRACAUDAL; PERINEURAL
Status: COMPLETED | OUTPATIENT
Start: 2020-01-01 | End: 2020-01-01

## 2020-01-01 RX ORDER — CLONAZEPAM 0.5 MG/1
TABLET ORAL
Qty: 60 TABLET | Refills: 0 | Status: SHIPPED | OUTPATIENT
Start: 2020-01-01 | End: 2020-01-01

## 2020-01-01 RX ORDER — CLONAZEPAM 0.5 MG/1
TABLET ORAL
Qty: 60 TABLET | Refills: 0 | Status: SHIPPED | OUTPATIENT
Start: 2020-01-01 | End: 2021-01-01

## 2020-01-01 RX ORDER — POTASSIUM CHLORIDE 750 MG/1
TABLET, FILM COATED, EXTENDED RELEASE ORAL
Qty: 60 TABLET | Refills: 3 | Status: SHIPPED | OUTPATIENT
Start: 2020-01-01 | End: 2021-01-01

## 2020-01-01 RX ORDER — MEMANTINE HYDROCHLORIDE 10 MG/1
TABLET ORAL
Qty: 90 TABLET | Refills: 0 | Status: SHIPPED | OUTPATIENT
Start: 2020-01-01 | End: 2021-01-01 | Stop reason: SDUPTHER

## 2020-01-01 RX ORDER — FUROSEMIDE 20 MG/1
TABLET ORAL
Qty: 60 TABLET | Refills: 3 | Status: SHIPPED | OUTPATIENT
Start: 2020-01-01 | End: 2021-01-01

## 2020-01-01 RX ORDER — TRIAMCINOLONE ACETONIDE 40 MG/ML
80 INJECTION, SUSPENSION INTRA-ARTICULAR; INTRAMUSCULAR
Status: COMPLETED | OUTPATIENT
Start: 2020-01-01 | End: 2020-01-01

## 2020-01-01 RX ORDER — LIDOCAINE HYDROCHLORIDE 10 MG/ML
8 INJECTION, SOLUTION EPIDURAL; INFILTRATION; INTRACAUDAL; PERINEURAL
Status: COMPLETED | OUTPATIENT
Start: 2020-01-01 | End: 2020-01-01

## 2020-01-01 RX ADMIN — TRIAMCINOLONE ACETONIDE 80 MG: 40 INJECTION, SUSPENSION INTRA-ARTICULAR; INTRAMUSCULAR at 13:22

## 2020-01-01 RX ADMIN — LIDOCAINE HYDROCHLORIDE 8 ML: 10 INJECTION, SOLUTION EPIDURAL; INFILTRATION; INTRACAUDAL; PERINEURAL at 13:22

## 2020-01-01 RX ADMIN — LIDOCAINE HYDROCHLORIDE 4 ML: 10 INJECTION, SOLUTION EPIDURAL; INFILTRATION; INTRACAUDAL; PERINEURAL at 13:22

## 2020-01-22 DIAGNOSIS — F41.9 ANXIETY: ICD-10-CM

## 2020-01-23 RX ORDER — CLONAZEPAM 0.5 MG/1
TABLET ORAL
Qty: 60 TABLET | Refills: 0 | Status: SHIPPED | OUTPATIENT
Start: 2020-01-23 | End: 2020-04-21

## 2020-03-03 ENCOUNTER — OFFICE VISIT (OUTPATIENT)
Dept: ORTHOPEDIC SURGERY | Facility: CLINIC | Age: 75
End: 2020-03-03

## 2020-03-03 DIAGNOSIS — M17.12 PRIMARY OSTEOARTHRITIS OF LEFT KNEE: Primary | ICD-10-CM

## 2020-03-03 DIAGNOSIS — M25.552 BILATERAL HIP PAIN: ICD-10-CM

## 2020-03-03 DIAGNOSIS — R52 PAIN: ICD-10-CM

## 2020-03-03 DIAGNOSIS — M25.551 BILATERAL HIP PAIN: ICD-10-CM

## 2020-03-03 PROCEDURE — 99213 OFFICE O/P EST LOW 20 MIN: CPT | Performed by: NURSE PRACTITIONER

## 2020-03-03 PROCEDURE — 20610 DRAIN/INJ JOINT/BURSA W/O US: CPT | Performed by: NURSE PRACTITIONER

## 2020-03-03 RX ORDER — TRIAMCINOLONE ACETONIDE 40 MG/ML
80 INJECTION, SUSPENSION INTRA-ARTICULAR; INTRAMUSCULAR
Status: COMPLETED | OUTPATIENT
Start: 2020-03-03 | End: 2020-03-03

## 2020-03-03 RX ORDER — LIDOCAINE HYDROCHLORIDE 10 MG/ML
8 INJECTION, SOLUTION EPIDURAL; INFILTRATION; INTRACAUDAL; PERINEURAL
Status: COMPLETED | OUTPATIENT
Start: 2020-03-03 | End: 2020-03-03

## 2020-03-03 RX ORDER — MORPHINE SULFATE 100 MG/1
TABLET ORAL
COMMUNITY
Start: 2020-02-21 | End: 2021-01-01

## 2020-03-03 RX ORDER — MORPHINE SULFATE 30 MG/1
TABLET ORAL
COMMUNITY
Start: 2020-02-21 | End: 2021-01-01

## 2020-03-03 RX ADMIN — LIDOCAINE HYDROCHLORIDE 8 ML: 10 INJECTION, SOLUTION EPIDURAL; INFILTRATION; INTRACAUDAL; PERINEURAL at 15:54

## 2020-03-03 RX ADMIN — TRIAMCINOLONE ACETONIDE 80 MG: 40 INJECTION, SUSPENSION INTRA-ARTICULAR; INTRAMUSCULAR at 15:54

## 2020-03-03 NOTE — PROGRESS NOTES
Subjective:     Patient ID: Sandra Mane is a 75 y.o. female.    Chief Complaint:  Follow-up primary osteoarthritis left knee  Bilateral hip pain  History of Present Illness  Sandra Mane returns to clinic for pain in bilateral hips and left knee.  Received corticosteroid injection approximately 3 months ago with approximately 2-month symptom relief.  Pain present the lateral aspect of bilateral hips, posterior aspect bilateral hips worse with transitional activity such as from seated standing attempting to walk.  She has received 100% symptom relief with previous corticosteroid injections the greater trochanter return in 3 months however is getting shorter periods of symptom relief with most recent injections.  She also received corticosteroid injection left knee last visit with approximately 2 months symptom relief.  Pain has returned maximal tenderness present the medial compartment.  Rates discomfort at worst 10 out of 10 with transitional activities and all weightbearing activities.  Again maximal tenderness the medial compartment is more tender with weightbearing activities mild symptom relief with rest.  Denies that the knee is locking, catching or giving out.  She did follow-up with wound care wound has completely healed she is very pleased with the symptom relief.  Denies other concerns present this time.    Social History     Occupational History   • Not on file   Tobacco Use   • Smoking status: Never Smoker   • Smokeless tobacco: Never Used   Substance and Sexual Activity   • Alcohol use: No   • Drug use: No   • Sexual activity: Defer      Past Medical History:   Diagnosis Date   • Anxiety    • Arthritis    • Back pain     SEES DR. BERRY FOR THIS   • Depression      Past Surgical History:   Procedure Laterality Date   • COLONOSCOPY     • JOINT REPLACEMENT     • REPLACEMENT TOTAL KNEE Right    • SPINAL CORD STIMULATOR IMPLANT  2011   • SPINAL CORD STIMULATOR IMPLANT         Family History   Problem  Relation Age of Onset   • Cancer Father    • COPD Father    • Breast cancer Paternal Aunt          Review of Systems   Constitutional: Negative for chills, diaphoresis, fever and unexpected weight change.   HENT: Negative for hearing loss, nosebleeds, sore throat and tinnitus.    Eyes: Negative for pain and visual disturbance.   Respiratory: Negative for cough, shortness of breath and wheezing.    Cardiovascular: Negative for chest pain and palpitations.   Gastrointestinal: Negative for abdominal pain, diarrhea, nausea and vomiting.   Endocrine: Negative for cold intolerance, heat intolerance and polydipsia.   Genitourinary: Negative for difficulty urinating, dysuria and hematuria.   Musculoskeletal: Positive for arthralgias. Negative for joint swelling and myalgias.   Skin: Negative for rash and wound.   Allergic/Immunologic: Negative for environmental allergies.   Neurological: Negative for dizziness, syncope and numbness.   Hematological: Does not bruise/bleed easily.   Psychiatric/Behavioral: Negative for dysphoric mood and sleep disturbance. The patient is not nervous/anxious.    All other systems reviewed and are negative.          Objective:  Physical Exam    General: No acute distress.  Eyes: conjunctiva clear; pupils equally round and reactive  ENT: external ears and nose atraumatic; oropharynx clear  CV: no peripheral edema  Resp: normal respiratory effort  Skin: no rashes or wounds; normal turgor  Psych: mood and affect appropriate; recent and remote memory intact    There were no vitals filed for this visit.  There were no vitals filed for this visit.  There is no height or weight on file to calculate BMI.      Left Knee Exam     Tenderness   The patient is experiencing tenderness in the medial joint line.    Range of Motion   Extension: 0   Flexion: 120     Tests   Zonia:  Medial - positive Lateral - negative  Varus: negative Valgus: negative  Lachman:  Anterior - 1+    Posterior - negative  Drawer:   Anterior - negative     Posterior - negative  Patellar apprehension: positive    Other   Erythema: absent  Sensation: normal  Pulse: present  Swelling: moderate  Effusion: no effusion present    Comments:  Positive crepitus her arc of motion  Positive active patellar compression test           Assessment:        1. Primary osteoarthritis of left knee    2. Bilateral hip pain    3. Pain           Plan:  1.  Discussed plan of care with patient.  We will proceed with fluoroscopy guided injection bilateral hips.  2.  Wish to proceed with corticosteroid injection left knee.  We will plan to see her back in clinic in approximately 4 weeks to start Visco supplementation injections left knee.  She verbalized understanding of information agrees with plan of care.  Denies other concerns present time.  Orders:  Orders Placed This Encounter   Procedures   • Large Joint Arthrocentesis: L knee   • [place] Guide For Pain Meds Injection Joint   • FL Guide For Pain Meds Injection Joint   • Visco Treatment       Medications:  No orders of the defined types were placed in this encounter.      Followup:  No follow-ups on file.    Sandra was seen today for follow-up, follow-up and follow-up.    Diagnoses and all orders for this visit:    Primary osteoarthritis of left knee  -     Visco Treatment; Future    Bilateral hip pain  -     FL Guide For Pain Meds Injection Joint; Future  -     FL Guide For Pain Meds Injection Joint; Future    Pain  -     Large Joint Arthrocentesis: L knee        Dictated utilizing Dragon dictation

## 2020-03-03 NOTE — PROGRESS NOTES
Procedure   Large Joint Arthrocentesis: L knee  Date/Time: 3/3/2020 3:54 PM  Consent given by: patient  Site marked: site marked  Timeout: Immediately prior to procedure a time out was called to verify the correct patient, procedure, equipment, support staff and site/side marked as required   Supporting Documentation  Indications: pain   Procedure Details  Location: knee - L knee  Preparation: Patient was prepped and draped in the usual sterile fashion  Needle size: 22 G  Approach: anterolateral  Medications administered: 8 mL lidocaine PF 1% 1 %; 80 mg triamcinolone acetonide 40 MG/ML  Patient tolerance: patient tolerated the procedure well with no immediate complications

## 2020-03-11 ENCOUNTER — HOSPITAL ENCOUNTER (OUTPATIENT)
Dept: GENERAL RADIOLOGY | Facility: HOSPITAL | Age: 75
Discharge: HOME | End: 2020-03-11
Admitting: RADIOLOGY

## 2020-03-11 DIAGNOSIS — M25.551 BILATERAL HIP PAIN: ICD-10-CM

## 2020-03-11 DIAGNOSIS — M25.552 BILATERAL HIP PAIN: ICD-10-CM

## 2020-03-11 PROCEDURE — 25010000002 TRIAMCINOLONE PER 10 MG: Performed by: NURSE PRACTITIONER

## 2020-03-11 PROCEDURE — 77002 NEEDLE LOCALIZATION BY XRAY: CPT

## 2020-03-11 PROCEDURE — 25010000003 LIDOCAINE 1 % SOLUTION: Performed by: NURSE PRACTITIONER

## 2020-03-11 PROCEDURE — 25010000002 IOPAMIDOL 61 % SOLUTION: Performed by: NURSE PRACTITIONER

## 2020-03-11 RX ORDER — TRIAMCINOLONE ACETONIDE 40 MG/ML
80 INJECTION, SUSPENSION INTRA-ARTICULAR; INTRAMUSCULAR
Status: COMPLETED | OUTPATIENT
Start: 2020-03-11 | End: 2020-03-11

## 2020-03-11 RX ORDER — LIDOCAINE HYDROCHLORIDE 10 MG/ML
4 INJECTION, SOLUTION INFILTRATION; PERINEURAL
Status: COMPLETED | OUTPATIENT
Start: 2020-03-11 | End: 2020-03-11

## 2020-03-11 RX ORDER — LIDOCAINE HYDROCHLORIDE 10 MG/ML
5 INJECTION, SOLUTION INFILTRATION; PERINEURAL
Status: COMPLETED | OUTPATIENT
Start: 2020-03-11 | End: 2020-03-11

## 2020-03-11 RX ADMIN — LIDOCAINE HYDROCHLORIDE 5 ML: 10 INJECTION, SOLUTION INFILTRATION; PERINEURAL at 14:07

## 2020-03-11 RX ADMIN — LIDOCAINE HYDROCHLORIDE 5 ML: 10 INJECTION, SOLUTION INFILTRATION; PERINEURAL at 14:35

## 2020-03-11 RX ADMIN — IOPAMIDOL 5 ML: 612 INJECTION, SOLUTION INTRAVENOUS at 14:35

## 2020-03-11 RX ADMIN — LIDOCAINE HYDROCHLORIDE 4 ML: 10 INJECTION, SOLUTION INFILTRATION; PERINEURAL at 14:37

## 2020-03-11 RX ADMIN — TRIAMCINOLONE ACETONIDE 80 MG: 40 INJECTION, SUSPENSION INTRA-ARTICULAR; INTRAMUSCULAR at 14:08

## 2020-03-11 RX ADMIN — LIDOCAINE HYDROCHLORIDE 4 ML: 10 INJECTION, SOLUTION INFILTRATION; PERINEURAL at 14:08

## 2020-03-11 RX ADMIN — IOPAMIDOL 5 ML: 612 INJECTION, SOLUTION INTRAVENOUS at 14:07

## 2020-03-11 RX ADMIN — TRIAMCINOLONE ACETONIDE 80 MG: 40 INJECTION, SUSPENSION INTRA-ARTICULAR; INTRAMUSCULAR at 14:37

## 2020-04-18 DIAGNOSIS — R60.9 DEPENDENT EDEMA: ICD-10-CM

## 2020-04-20 ENCOUNTER — LAB (OUTPATIENT)
Dept: INTERNAL MEDICINE | Facility: CLINIC | Age: 75
End: 2020-04-20

## 2020-04-20 DIAGNOSIS — E78.5 HYPERLIPIDEMIA, UNSPECIFIED HYPERLIPIDEMIA TYPE: ICD-10-CM

## 2020-04-20 DIAGNOSIS — F41.9 ANXIETY: ICD-10-CM

## 2020-04-20 DIAGNOSIS — D64.9 ANEMIA, UNSPECIFIED TYPE: ICD-10-CM

## 2020-04-20 DIAGNOSIS — Z00.00 ROUTINE HEALTH MAINTENANCE: ICD-10-CM

## 2020-04-20 RX ORDER — FUROSEMIDE 20 MG/1
TABLET ORAL
Qty: 30 TABLET | Refills: 1 | Status: SHIPPED | OUTPATIENT
Start: 2020-04-20 | End: 2020-06-01 | Stop reason: SDUPTHER

## 2020-04-20 RX ORDER — POTASSIUM CHLORIDE 750 MG/1
TABLET, FILM COATED, EXTENDED RELEASE ORAL
Qty: 30 TABLET | Refills: 1 | Status: SHIPPED | OUTPATIENT
Start: 2020-04-20 | End: 2020-06-01 | Stop reason: SDUPTHER

## 2020-04-21 RX ORDER — CLONAZEPAM 0.5 MG/1
TABLET ORAL
Qty: 60 TABLET | Refills: 0 | Status: SHIPPED | OUTPATIENT
Start: 2020-04-21 | End: 2020-05-21

## 2020-05-20 DIAGNOSIS — F41.9 ANXIETY: ICD-10-CM

## 2020-05-21 RX ORDER — CLONAZEPAM 0.5 MG/1
TABLET ORAL
Qty: 60 TABLET | Refills: 0 | Status: SHIPPED | OUTPATIENT
Start: 2020-05-21 | End: 2020-06-26

## 2020-06-01 ENCOUNTER — OFFICE VISIT (OUTPATIENT)
Dept: INTERNAL MEDICINE | Facility: CLINIC | Age: 75
End: 2020-06-01

## 2020-06-01 VITALS
BODY MASS INDEX: 33.42 KG/M2 | SYSTOLIC BLOOD PRESSURE: 120 MMHG | RESPIRATION RATE: 16 BRPM | DIASTOLIC BLOOD PRESSURE: 78 MMHG | OXYGEN SATURATION: 95 % | TEMPERATURE: 97.6 F | HEIGHT: 65 IN | HEART RATE: 75 BPM | WEIGHT: 200.6 LBS

## 2020-06-01 DIAGNOSIS — K21.9 GASTROESOPHAGEAL REFLUX DISEASE WITHOUT ESOPHAGITIS: ICD-10-CM

## 2020-06-01 DIAGNOSIS — N18.30 CKD (CHRONIC KIDNEY DISEASE) STAGE 3, GFR 30-59 ML/MIN (HCC): ICD-10-CM

## 2020-06-01 DIAGNOSIS — F41.8 DEPRESSION WITH ANXIETY: ICD-10-CM

## 2020-06-01 DIAGNOSIS — D64.9 ANEMIA, UNSPECIFIED TYPE: ICD-10-CM

## 2020-06-01 DIAGNOSIS — M25.562 CHRONIC PAIN OF LEFT KNEE: ICD-10-CM

## 2020-06-01 DIAGNOSIS — R60.9 DEPENDENT EDEMA: ICD-10-CM

## 2020-06-01 DIAGNOSIS — G89.29 CHRONIC MIDLINE LOW BACK PAIN, UNSPECIFIED WHETHER SCIATICA PRESENT: ICD-10-CM

## 2020-06-01 DIAGNOSIS — F02.80 LATE ONSET ALZHEIMER'S DISEASE WITHOUT BEHAVIORAL DISTURBANCE (HCC): ICD-10-CM

## 2020-06-01 DIAGNOSIS — G30.1 LATE ONSET ALZHEIMER'S DISEASE WITHOUT BEHAVIORAL DISTURBANCE (HCC): ICD-10-CM

## 2020-06-01 DIAGNOSIS — E78.5 HYPERLIPIDEMIA, UNSPECIFIED HYPERLIPIDEMIA TYPE: Primary | ICD-10-CM

## 2020-06-01 DIAGNOSIS — M54.50 CHRONIC MIDLINE LOW BACK PAIN, UNSPECIFIED WHETHER SCIATICA PRESENT: ICD-10-CM

## 2020-06-01 DIAGNOSIS — G89.29 CHRONIC PAIN OF LEFT KNEE: ICD-10-CM

## 2020-06-01 PROCEDURE — 99214 OFFICE O/P EST MOD 30 MIN: CPT | Performed by: FAMILY MEDICINE

## 2020-06-01 RX ORDER — POTASSIUM CHLORIDE 750 MG/1
20 TABLET, FILM COATED, EXTENDED RELEASE ORAL DAILY
Qty: 60 TABLET | Refills: 4 | Status: SHIPPED | OUTPATIENT
Start: 2020-06-01 | End: 2020-01-01

## 2020-06-01 RX ORDER — FUROSEMIDE 20 MG/1
40 TABLET ORAL DAILY
Qty: 60 TABLET | Refills: 4 | Status: SHIPPED | OUTPATIENT
Start: 2020-06-01 | End: 2020-01-01

## 2020-06-01 NOTE — PROGRESS NOTES
Subjective     Sandra Mane is a 75 y.o. female, who presents with a chief complaint of   Chief Complaint   Patient presents with   • Leg Swelling   • Depression   • Chronic Kidney Disease     Anemia     Chronic Kidney Disease   Associated symptoms include arthralgias.   Hyperlipidemia     Depression Patient is not experiencing: nervousness/anxiety.    Memory Loss   Associated symptoms include arthralgias.   Knee Pain      Leg Swelling   Associated symptoms include arthralgias.     1. Dementia.  Pt takes memantine.    2. Depression with anxiety.  Pt states she is doing okay with fluoxetine.  Denies SI.    3. Chronic back pain.  She sees Dr. Rea, who does epidural injections.     4. Left knee OA.  She has an injection by DINORAH Hauser, pending this week.    5. Leg swelling.  This is chronic.  Worse recently. She has been sitting in her house for the past 2.5 months due to the Covid-19 pandemic and her back pain.  She has tried compression stockings but doesn't like to put them on.    The following portions of the patient's history were reviewed and updated as appropriate: allergies, current medications, past family history, past medical history, past social history, past surgical history and problem list.    Allergies: Patient has no known allergies.    Review of Systems   Constitutional: Negative.    HENT: Negative.    Eyes: Negative.    Cardiovascular: Positive for leg swelling.   Gastrointestinal: Negative.    Endocrine: Negative.    Genitourinary: Negative.    Musculoskeletal: Positive for arthralgias and back pain.   Allergic/Immunologic: Negative.    Neurological: Negative.    Hematological: Negative.    Psychiatric/Behavioral: The patient is not nervous/anxious.      Objective     Wt Readings from Last 3 Encounters:   06/01/20 91 kg (200 lb 9.6 oz)   12/03/19 98 kg (216 lb)   10/31/19 98 kg (216 lb)     Temp Readings from Last 3 Encounters:   06/01/20 97.6 °F (36.4 °C) (Temporal)   10/31/19 98.3 °F  (36.8 °C) (Oral)   06/27/19 98.3 °F (36.8 °C) (Oral)     BP Readings from Last 3 Encounters:   06/01/20 120/78   10/31/19 144/80   07/24/19 116/71     Pulse Readings from Last 3 Encounters:   06/01/20 75   10/31/19 73   07/24/19 60     Body mass index is 33.38 kg/m².  SpO2 Readings from Last 3 Encounters:   02/26/18 96%   12/04/17 97%   10/10/17 95%       Physical Exam   Constitutional: She is oriented to person, place, and time. She appears well-developed and well-nourished.   Sitting in wheelchair.   HENT:   Head: Normocephalic and atraumatic.   Eyes: Conjunctivae and EOM are normal.   Neck: Neck supple.   Cardiovascular: Normal rate, regular rhythm and normal heart sounds. Exam reveals no gallop and no friction rub.   No murmur heard.  Pulmonary/Chest: Effort normal and breath sounds normal. No respiratory distress. She has no wheezes. She has no rales.   Abdominal: Soft. Bowel sounds are normal. There is no hepatosplenomegaly.   Musculoskeletal: She exhibits edema (2+ shins). She exhibits no deformity.   Back not examined.   Lymphadenopathy:     She has no cervical adenopathy.   Neurological: She is alert and oriented to person, place, and time.   Skin: Skin is warm and dry. No rash noted.   Psychiatric: She has a normal mood and affect. Her behavior is normal.   Nursing note and vitals reviewed.      Assessment/Plan   Sandra was seen today for leg swelling, depression and chronic kidney disease.    Diagnoses and all orders for this visit:    Hyperlipidemia, unspecified hyperlipidemia type  -     Lipid Panel With / Chol / HDL Ratio  -     TSH    CKD (chronic kidney disease) stage 3, GFR 30-59 ml/min (CMS/[facility])  -     Comprehensive Metabolic Panel    Depression with anxiety    Chronic midline low back pain, unspecified whether sciatica present    Anemia, unspecified type  -     CBC & Differential    Late onset Alzheimer's disease without behavioral disturbance (CMS/[facility])    Gastroesophageal reflux disease without  esophagitis    Dependent edema  -     furosemide (LASIX) 20 MG tablet; Take 2 tablets by mouth Daily.  -     potassium chloride (K-DUR) 10 MEQ CR tablet; Take 2 tablets by mouth Daily.    Chronic pain of left knee      1. Hyperlipidemia.  Mild.  Controlled with lifestyle measures.  Labs today.    2. CKDIII.  Repeat labs.  Avoid NSAIDs.    3. Depression with anxiety.  Continue fluoxetine.    4. Chronic back pain.  Stable.  Per pain management.  Dr. Rea.    5. Anemia.  Chronic.  Stable.  Ferritin, B12, folate normal in the past.     6. Alzheimer's.  She has had neuropsych testing and neurology consult.  Dr. Cole's note and recommendations were reviewed.  Continue memantine.    8. GERD. Controlled with omeprazole prn.    9. Dependent edema.  Increase furosemide to 40 mg daily.  Increase potassium supplement to 20 mEq daily.  Labs.  Compression stockings declined.  Elevated the legs.    10. Left knee OA.  Injections per ortho.    11. Routine health maint.  They received the ColMicroinoxuard in the mail but haven't sent it in.  Mammogram UTD.  Flu, Pneumovax, Tdap.      Outpatient Medications Prior to Visit   Medication Sig Dispense Refill   • clonazePAM (KlonoPIN) 0.5 MG tablet TAKE TWO TABLETS BY MOUTH EVERY NIGHT AT BEDTIME AS NEEDED 60 tablet 0   • FLUoxetine (PROzac) 20 MG tablet TAKE ONE TABLET BY MOUTH DAILY 30 tablet 5   • fluticasone (FLONASE) 50 MCG/ACT nasal spray 2 sprays into the nostril(s) as directed by provider Daily.     • gabapentin (NEURONTIN) 400 MG capsule Take 400 mg by mouth every 4 (four) hours.     • memantine (NAMENDA) 10 MG tablet TAKE ONE TABLET BY MOUTH DAILY 90 tablet 0   • Morphine (MS CONTIN) 100 MG 12 hr tablet      • Morphine (MSIR) 30 MG tablet      • omeprazole (priLOSEC) 20 MG capsule Take 20 mg by mouth.     • furosemide (LASIX) 20 MG tablet TAKE ONE TABLET BY MOUTH DAILY 30 tablet 1   • potassium chloride (K-DUR) 10 MEQ CR tablet TAKE ONE TABLET BY MOUTH DAILY 30 tablet 1   •  betamethasone dipropionate (DIPROLENE) 0.05 % cream Apply  topically to the appropriate area as directed 2 (Two) Times a Day. Apply to affected area BID 30 g 0     No facility-administered medications prior to visit.      New Medications Ordered This Visit   Medications   • furosemide (LASIX) 20 MG tablet     Sig: Take 2 tablets by mouth Daily.     Dispense:  60 tablet     Refill:  4   • potassium chloride (K-DUR) 10 MEQ CR tablet     Sig: Take 2 tablets by mouth Daily.     Dispense:  60 tablet     Refill:  4     [unfilled]  Medications Discontinued During This Encounter   Medication Reason   • betamethasone dipropionate (DIPROLENE) 0.05 % cream *Therapy completed   • furosemide (LASIX) 20 MG tablet Reorder   • potassium chloride (K-DUR) 10 MEQ CR tablet Reorder       Return in about 3 months (around 9/1/2020).

## 2020-06-02 LAB
ALBUMIN SERPL-MCNC: 4.2 G/DL (ref 3.5–5.2)
ALBUMIN/GLOB SERPL: 1.6 G/DL
ALP SERPL-CCNC: 90 U/L (ref 39–117)
ALT SERPL-CCNC: 12 U/L (ref 1–33)
AST SERPL-CCNC: 21 U/L (ref 1–32)
BASOPHILS # BLD AUTO: 0.02 10*3/MM3 (ref 0–0.2)
BASOPHILS NFR BLD AUTO: 0.4 % (ref 0–1.5)
BILIRUB SERPL-MCNC: 0.4 MG/DL (ref 0.2–1.2)
BUN SERPL-MCNC: 14 MG/DL (ref 8–23)
BUN/CREAT SERPL: 10.1 (ref 7–25)
CALCIUM SERPL-MCNC: 9.2 MG/DL (ref 8.6–10.5)
CHLORIDE SERPL-SCNC: 100 MMOL/L (ref 98–107)
CHOLEST SERPL-MCNC: 196 MG/DL (ref 0–200)
CHOLEST/HDLC SERPL: 3.84 {RATIO}
CO2 SERPL-SCNC: 29.4 MMOL/L (ref 22–29)
CREAT SERPL-MCNC: 1.39 MG/DL (ref 0.57–1)
EOSINOPHIL # BLD AUTO: 0.29 10*3/MM3 (ref 0–0.4)
EOSINOPHIL NFR BLD AUTO: 5.5 % (ref 0.3–6.2)
ERYTHROCYTE [DISTWIDTH] IN BLOOD BY AUTOMATED COUNT: 13.8 % (ref 12.3–15.4)
GLOBULIN SER CALC-MCNC: 2.7 GM/DL
GLUCOSE SERPL-MCNC: 95 MG/DL (ref 65–99)
HCT VFR BLD AUTO: 32.1 % (ref 34–46.6)
HDLC SERPL-MCNC: 51 MG/DL (ref 40–60)
HGB BLD-MCNC: 10.5 G/DL (ref 12–15.9)
IMM GRANULOCYTES # BLD AUTO: 0.03 10*3/MM3 (ref 0–0.05)
IMM GRANULOCYTES NFR BLD AUTO: 0.6 % (ref 0–0.5)
LDLC SERPL CALC-MCNC: 114 MG/DL (ref 0–100)
LYMPHOCYTES # BLD AUTO: 1.18 10*3/MM3 (ref 0.7–3.1)
LYMPHOCYTES NFR BLD AUTO: 22.2 % (ref 19.6–45.3)
MCH RBC QN AUTO: 30.8 PG (ref 26.6–33)
MCHC RBC AUTO-ENTMCNC: 32.7 G/DL (ref 31.5–35.7)
MCV RBC AUTO: 94.1 FL (ref 79–97)
MONOCYTES # BLD AUTO: 0.53 10*3/MM3 (ref 0.1–0.9)
MONOCYTES NFR BLD AUTO: 10 % (ref 5–12)
NEUTROPHILS # BLD AUTO: 3.26 10*3/MM3 (ref 1.7–7)
NEUTROPHILS NFR BLD AUTO: 61.3 % (ref 42.7–76)
NRBC BLD AUTO-RTO: 0 /100 WBC (ref 0–0.2)
PLATELET # BLD AUTO: 241 10*3/MM3 (ref 140–450)
POTASSIUM SERPL-SCNC: 3.8 MMOL/L (ref 3.5–5.2)
PROT SERPL-MCNC: 6.9 G/DL (ref 6–8.5)
RBC # BLD AUTO: 3.41 10*6/MM3 (ref 3.77–5.28)
SODIUM SERPL-SCNC: 140 MMOL/L (ref 136–145)
TRIGL SERPL-MCNC: 156 MG/DL (ref 0–150)
TSH SERPL DL<=0.005 MIU/L-ACNC: 2.18 UIU/ML (ref 0.27–4.2)
VLDLC SERPL CALC-MCNC: 31.2 MG/DL
WBC # BLD AUTO: 5.31 10*3/MM3 (ref 3.4–10.8)

## 2020-06-03 ENCOUNTER — TELEPHONE (OUTPATIENT)
Dept: INTERNAL MEDICINE | Facility: CLINIC | Age: 75
End: 2020-06-03

## 2020-06-03 DIAGNOSIS — N18.30 STAGE 3 CHRONIC KIDNEY DISEASE (HCC): Primary | ICD-10-CM

## 2020-06-04 ENCOUNTER — CLINICAL SUPPORT (OUTPATIENT)
Dept: ORTHOPEDIC SURGERY | Facility: CLINIC | Age: 75
End: 2020-06-04

## 2020-06-04 DIAGNOSIS — R52 PAIN: Primary | ICD-10-CM

## 2020-06-04 DIAGNOSIS — M17.12 PRIMARY OSTEOARTHRITIS OF LEFT KNEE: ICD-10-CM

## 2020-06-04 PROCEDURE — 20610 DRAIN/INJ JOINT/BURSA W/O US: CPT | Performed by: NURSE PRACTITIONER

## 2020-06-04 RX ORDER — MEMANTINE HYDROCHLORIDE 10 MG/1
TABLET ORAL
Qty: 90 TABLET | Refills: 0 | Status: SHIPPED | OUTPATIENT
Start: 2020-06-04 | End: 2020-09-02

## 2020-06-04 NOTE — PROGRESS NOTES
Large Joint Arthrocentesis: L knee  Date/Time: 6/4/2020 3:40 PM  Consent given by: patient  Site marked: site marked  Timeout: Immediately prior to procedure a time out was called to verify the correct patient, procedure, equipment, support staff and site/side marked as required   Supporting Documentation  Indications: pain   Procedure Details  Location: knee - L knee  Preparation: Patient was prepped and draped in the usual sterile fashion  Needle size: 22 G  Approach: superior lateral.  Patient tolerance: patient tolerated the procedure well with no immediate complications      Durolane Injection - Patient provided Specialty Pharmacy   Patient presents to clinic today for left knee viscosupplement injections.  This is the single injection of the series.  I explained details of injections as well as risks, benefits and alternatives with the patient today, had all questions answered, wished to proceed with injections.  I will see patient back in 8 weeks for re-evaluation. Patient was instructed to watch for signs or symptoms of infection including redness, swelling, warmth to the touch, or significant increased pain and to contact our office immediately if any of these issues were noted.

## 2020-06-08 ENCOUNTER — RESULTS ENCOUNTER (OUTPATIENT)
Dept: INTERNAL MEDICINE | Facility: CLINIC | Age: 75
End: 2020-06-08

## 2020-06-08 DIAGNOSIS — N18.30 STAGE 3 CHRONIC KIDNEY DISEASE (HCC): ICD-10-CM

## 2020-06-19 DIAGNOSIS — F41.9 ANXIETY: ICD-10-CM

## 2020-06-25 LAB
ALBUMIN SERPL-MCNC: 4.2 G/DL (ref 3.5–5.2)
ALBUMIN/GLOB SERPL: 1.3 G/DL
ALP SERPL-CCNC: 83 U/L (ref 39–117)
ALT SERPL-CCNC: 12 U/L (ref 1–33)
AST SERPL-CCNC: 20 U/L (ref 1–32)
BASOPHILS # BLD AUTO: 0.03 10*3/MM3 (ref 0–0.2)
BASOPHILS NFR BLD AUTO: 0.6 % (ref 0–1.5)
BILIRUB SERPL-MCNC: 0.5 MG/DL (ref 0.2–1.2)
BUN SERPL-MCNC: 18 MG/DL (ref 8–23)
BUN/CREAT SERPL: 12.6 (ref 7–25)
CALCIUM SERPL-MCNC: 10 MG/DL (ref 8.6–10.5)
CHLORIDE SERPL-SCNC: 96 MMOL/L (ref 98–107)
CHOLEST SERPL-MCNC: 212 MG/DL (ref 0–200)
CHOLEST/HDLC SERPL: 3.93 {RATIO}
CO2 SERPL-SCNC: 29 MMOL/L (ref 22–29)
CREAT SERPL-MCNC: 1.43 MG/DL (ref 0.57–1)
EOSINOPHIL # BLD AUTO: 0.3 10*3/MM3 (ref 0–0.4)
EOSINOPHIL NFR BLD AUTO: 6 % (ref 0.3–6.2)
ERYTHROCYTE [DISTWIDTH] IN BLOOD BY AUTOMATED COUNT: 13.5 % (ref 12.3–15.4)
GLOBULIN SER CALC-MCNC: 3.2 GM/DL
GLUCOSE SERPL-MCNC: 114 MG/DL (ref 65–99)
HCT VFR BLD AUTO: 33.5 % (ref 34–46.6)
HCV AB S/CO SERPL IA: <0.1 S/CO RATIO (ref 0–0.9)
HDLC SERPL-MCNC: 54 MG/DL (ref 40–60)
HGB BLD-MCNC: 10.7 G/DL (ref 12–15.9)
IMM GRANULOCYTES # BLD AUTO: 0.01 10*3/MM3 (ref 0–0.05)
IMM GRANULOCYTES NFR BLD AUTO: 0.2 % (ref 0–0.5)
LDLC SERPL CALC-MCNC: 127 MG/DL (ref 0–100)
LYMPHOCYTES # BLD AUTO: 1.07 10*3/MM3 (ref 0.7–3.1)
LYMPHOCYTES NFR BLD AUTO: 21.5 % (ref 19.6–45.3)
MCH RBC QN AUTO: 29.9 PG (ref 26.6–33)
MCHC RBC AUTO-ENTMCNC: 31.9 G/DL (ref 31.5–35.7)
MCV RBC AUTO: 93.6 FL (ref 79–97)
MONOCYTES # BLD AUTO: 0.52 10*3/MM3 (ref 0.1–0.9)
MONOCYTES NFR BLD AUTO: 10.5 % (ref 5–12)
NEUTROPHILS # BLD AUTO: 3.04 10*3/MM3 (ref 1.7–7)
NEUTROPHILS NFR BLD AUTO: 61.2 % (ref 42.7–76)
NRBC BLD AUTO-RTO: 0 /100 WBC (ref 0–0.2)
PLATELET # BLD AUTO: 224 10*3/MM3 (ref 140–450)
POTASSIUM SERPL-SCNC: 3.8 MMOL/L (ref 3.5–5.2)
PROT SERPL-MCNC: 7.4 G/DL (ref 6–8.5)
RBC # BLD AUTO: 3.58 10*6/MM3 (ref 3.77–5.28)
SODIUM SERPL-SCNC: 138 MMOL/L (ref 136–145)
TRIGL SERPL-MCNC: 153 MG/DL (ref 0–150)
TSH SERPL DL<=0.005 MIU/L-ACNC: 2.84 UIU/ML (ref 0.27–4.2)
VLDLC SERPL CALC-MCNC: 30.6 MG/DL
WBC # BLD AUTO: 4.97 10*3/MM3 (ref 3.4–10.8)

## 2020-06-26 RX ORDER — CLONAZEPAM 0.5 MG/1
TABLET ORAL
Qty: 60 TABLET | Refills: 0 | Status: SHIPPED | OUTPATIENT
Start: 2020-06-26 | End: 2020-07-24

## 2020-07-01 ENCOUNTER — TELEPHONE (OUTPATIENT)
Dept: INTERNAL MEDICINE | Facility: CLINIC | Age: 75
End: 2020-07-01

## 2020-07-01 NOTE — TELEPHONE ENCOUNTER
Patient's  returned call from message left.  Notes recorded by PCP that labs were stable was shared with spouse.  .

## 2020-07-21 ENCOUNTER — OFFICE VISIT (OUTPATIENT)
Dept: INTERNAL MEDICINE | Facility: CLINIC | Age: 75
End: 2020-07-21

## 2020-07-21 VITALS
HEIGHT: 65 IN | TEMPERATURE: 97.3 F | HEART RATE: 53 BPM | RESPIRATION RATE: 16 BRPM | WEIGHT: 196 LBS | SYSTOLIC BLOOD PRESSURE: 170 MMHG | DIASTOLIC BLOOD PRESSURE: 102 MMHG | OXYGEN SATURATION: 96 % | BODY MASS INDEX: 32.65 KG/M2

## 2020-07-21 DIAGNOSIS — S81.801A WOUND OF RIGHT LOWER EXTREMITY, INITIAL ENCOUNTER: ICD-10-CM

## 2020-07-21 DIAGNOSIS — R60.9 DEPENDENT EDEMA: Primary | ICD-10-CM

## 2020-07-21 PROCEDURE — 99213 OFFICE O/P EST LOW 20 MIN: CPT | Performed by: NURSE PRACTITIONER

## 2020-07-21 NOTE — PROGRESS NOTES
"Sandra Mane is a 75 y.o. female presenting today for   Chief Complaint   Patient presents with   • Leg Swelling     bilateral   • Skin Lesion     bilateral LE       Subjective    History of Present Illness     Pt presents for an acute visit r/t dependent edema.  She has chronic dependent edema. She cannot necessarily say this is any worse today than it has been. There have been no changes in her LEs from their baseline over the last several months. The LEs  Are  No more erythematous than normal.   Pt denies fever/chills, pain, CP, SOB, palpitations. No recent wgt gain.  Pt's Lasix was increased at last visit w/ PCP.  She has been unable to tolerate compression stockings in the past.      The following portions of the patient's history were reviewed and updated as appropriate: allergies, current medications, problem list, past medical history, past surgical history, family history, and social history.    Review of Systems   Constitutional: Negative for chills, fever and unexpected weight gain.   Respiratory: Negative for cough and shortness of breath.    Cardiovascular: Positive for leg swelling. Negative for chest pain and palpitations.         Objective    Vitals:    07/21/20 1525 07/21/20 1534   BP: (!) 190/110 (!) 170/102   BP Location: Left arm Right arm   Patient Position: Sitting Sitting   Cuff Size: Adult Adult   Pulse: 53    Resp: 16    Temp: 97.3 °F (36.3 °C)    TempSrc: Temporal    SpO2: 96%    Weight: 88.9 kg (196 lb)    Height: 165.1 cm (65\")      Body mass index is 32.62 kg/m².  Nursing notes and vitals reviewed.    Physical Exam   Constitutional: She is oriented to person, place, and time. She appears well-developed and well-nourished. No distress.   Seated in wheelchair.   Cardiovascular: Regular rhythm, S1 normal and S2 normal.   /80.  2+ pitting edema to the knee of Bilat LEs. There is some weeping of the LLE.    Pulmonary/Chest: Effort normal and breath sounds normal.   Neurological: She is " alert and oriented to person, place, and time.   Skin:        No lesions noted on the L LE.     Psychiatric: She has a normal mood and affect. Her behavior is normal. Thought content normal. She is attentive.         Assessment and Plan    Sandra was seen today for leg swelling and skin lesion.    Diagnoses and all orders for this visit:    Dependent edema    Wound of right lower extremity, initial encounter  -     mupirocin (Bactroban) 2 % ointment; Apply  topically to the appropriate area as directed 3 (Three) Times a Day.      I do not see any indication for systemic abx.  Per pt's report, things are really no different today from her baseline over the last several months.  MA will wrap LLE today and will discuss possible referral w/ PCP.      Medications, including side effects, were discussed with the patient. Patient verbalized understanding.  The plan of care was discussed. All questions were answered. Patient verbalized understanding.        F/U w/ Dr. Blanton as previously scheduled. Discussed ER S&S.

## 2020-07-23 DIAGNOSIS — F41.9 ANXIETY: ICD-10-CM

## 2020-07-24 RX ORDER — CLONAZEPAM 0.5 MG/1
TABLET ORAL
Qty: 60 TABLET | Refills: 0 | Status: SHIPPED | OUTPATIENT
Start: 2020-07-24 | End: 2020-08-24

## 2020-07-25 DIAGNOSIS — F41.8 DEPRESSION WITH ANXIETY: ICD-10-CM

## 2020-07-28 RX ORDER — FLUOXETINE 20 MG/1
TABLET, FILM COATED ORAL
Qty: 30 TABLET | Refills: 4 | Status: SHIPPED | OUTPATIENT
Start: 2020-07-28 | End: 2020-09-03 | Stop reason: SDUPTHER

## 2020-07-30 ENCOUNTER — OFFICE VISIT (OUTPATIENT)
Dept: ORTHOPEDIC SURGERY | Facility: CLINIC | Age: 75
End: 2020-07-30

## 2020-07-30 DIAGNOSIS — M17.12 PRIMARY OSTEOARTHRITIS OF LEFT KNEE: ICD-10-CM

## 2020-07-30 DIAGNOSIS — R52 PAIN: Primary | ICD-10-CM

## 2020-07-30 DIAGNOSIS — M19.012 PRIMARY OSTEOARTHRITIS, LEFT SHOULDER: ICD-10-CM

## 2020-07-30 DIAGNOSIS — M75.122 COMPLETE TEAR OF LEFT ROTATOR CUFF, UNSPECIFIED WHETHER TRAUMATIC: ICD-10-CM

## 2020-07-30 PROCEDURE — 99213 OFFICE O/P EST LOW 20 MIN: CPT | Performed by: NURSE PRACTITIONER

## 2020-07-30 PROCEDURE — 73030 X-RAY EXAM OF SHOULDER: CPT | Performed by: NURSE PRACTITIONER

## 2020-07-30 PROCEDURE — 20610 DRAIN/INJ JOINT/BURSA W/O US: CPT | Performed by: NURSE PRACTITIONER

## 2020-07-30 RX ADMIN — LIDOCAINE HYDROCHLORIDE 4 ML: 10 INJECTION, SOLUTION EPIDURAL; INFILTRATION; INTRACAUDAL; PERINEURAL at 13:48

## 2020-07-30 RX ADMIN — TRIAMCINOLONE ACETONIDE 80 MG: 40 INJECTION, SUSPENSION INTRA-ARTICULAR; INTRAMUSCULAR at 13:48

## 2020-07-30 NOTE — PROGRESS NOTES
Subjective:     Patient ID: Sandra Mane is a 75 y.o. female.    Chief Complaint:  Follow-up DJD left knee  Left shoulder pain, new issue to examiner  History of Present Illness  Sandra Mane returns to clinic for follow-up DJD left knee received Visco supplementation injection last visit 6/4/2020 with significant symptom relief.  Pain has decreased she is pleased with the symptom relief she is achieved thus far and does not wish to be seen for left knee today.  She does wish to be seen for pain she is experiencing at the left shoulder.  Maximal tenderness present the lateral aspect, AC joint, anterior lateral aspect of the shoulder.  Increased pain noted with reaching up above head, reaching out to side, reaching behind her back.  Is not experiencing numbness or tingling down the left upper extremity.  Increased pain noted when attempting to sleep on the shoulder at night and with pushing herself up.  Denies known injury.  Denies any recent x-ray, MRI, CT.  Not seeming corticosteroid injections to the shoulder.  Denies any previous surgical intervention.  Rates discomfort 6 out of a 10 mainly aching in nature.  Denies other concerns present time.     Social History     Occupational History   • Not on file   Tobacco Use   • Smoking status: Never Smoker   • Smokeless tobacco: Never Used   Substance and Sexual Activity   • Alcohol use: No   • Drug use: No   • Sexual activity: Defer      Past Medical History:   Diagnosis Date   • Anxiety    • Arthritis    • Back pain     SEES DR. BERRY FOR THIS   • Depression      Past Surgical History:   Procedure Laterality Date   • COLONOSCOPY     • JOINT REPLACEMENT     • REPLACEMENT TOTAL KNEE Right    • SPINAL CORD STIMULATOR IMPLANT  2011   • SPINAL CORD STIMULATOR IMPLANT         Family History   Problem Relation Age of Onset   • Cancer Father    • COPD Father    • Breast cancer Paternal Aunt          Review of Systems   Constitutional: Negative for chills, diaphoresis, fever  and unexpected weight change.   HENT: Negative for hearing loss, nosebleeds, sore throat and tinnitus.    Eyes: Negative for pain and visual disturbance.   Respiratory: Negative for cough, shortness of breath and wheezing.    Cardiovascular: Negative for chest pain and palpitations.   Gastrointestinal: Negative for abdominal pain, diarrhea, nausea and vomiting.   Endocrine: Negative for cold intolerance, heat intolerance and polydipsia.   Genitourinary: Negative for difficulty urinating, dysuria and hematuria.   Musculoskeletal: Positive for arthralgias. Negative for joint swelling and myalgias.   Skin: Negative for rash and wound.   Allergic/Immunologic: Negative for environmental allergies.   Neurological: Negative for dizziness, syncope and numbness.   Hematological: Does not bruise/bleed easily.   Psychiatric/Behavioral: Negative for dysphoric mood and sleep disturbance. The patient is not nervous/anxious.    All other systems reviewed and are negative.          Objective:  Physical Exam    General: No acute distress.  Eyes: conjunctiva clear; pupils equally round and reactive  ENT: external ears and nose atraumatic; oropharynx clear  CV: no peripheral edema  Resp: normal respiratory effort  Skin: no rashes or wounds; normal turgor  Psych: mood and affect appropriate; recent and remote memory intact    There were no vitals filed for this visit.  There were no vitals filed for this visit.  There is no height or weight on file to calculate BMI.      Left Shoulder Exam     Tenderness   The patient is experiencing tenderness in the acromion.    Range of Motion   External rotation: 50   Forward flexion: 160     Muscle Strength   Internal rotation: 4/5   External rotation: 4/5   Supraspinatus: 4/5   Subscapularis: 4/5   Biceps: 4/5     Tests   Pitts test: positive  Cross arm: positive  Impingement: positive  Drop arm: negative    Other   Erythema: absent  Sensation: normal  Pulse: present     Comments:  Internal  rotation to side  positive empty can  positive [place]'s  positive Speed's  positive bear hug exam                   Imaging:  Left Shoulder X-Ray  Indication: Pain  AP Internal and External Rotation views only     Findings:  No fracture  Normal soft tissues  Humeral head, osteophytes present glenohumeral joints, moderate to advanced glenohumeral joint arthrosis, AC joint arthrosis    No prior studies were available for comparison.    Assessment:        1. Pain    2. Primary osteoarthritis, left shoulder    3. Complete tear of left rotator cuff, unspecified whether traumatic    4. Primary osteoarthritis of left knee           Plan:      Large Joint Arthrocentesis: L subacromial bursa  Date/Time: 7/30/2020 1:48 PM  Consent given by: patient  Site marked: site marked  Timeout: Immediately prior to procedure a time out was called to verify the correct patient, procedure, equipment, support staff and site/side marked as required   Supporting Documentation  Indications: pain and joint swelling   Procedure Details  Location: shoulder - L subacromial bursa  Preparation: Patient was prepped and draped in the usual sterile fashion  Needle size: 22 G  Approach: lateral  Medications administered: 4 mL lidocaine PF 1% 1 %; 80 mg triamcinolone acetonide 40 MG/ML  Patient tolerance: patient tolerated the procedure well with no immediate complications          Orders:  Orders Placed This Encounter   Procedures   • Large Joint Arthrocentesis: L subacromial bursa   • XR Shoulder 2+ View Left       Medications:  No orders of the defined types were placed in this encounter.      Followup:  No follow-ups on file.    Sandra was seen today for pain and follow-up.    Diagnoses and all orders for this visit:    Pain  -     XR Shoulder 2+ View Left    Primary osteoarthritis, left shoulder    Complete tear of left rotator cuff, unspecified whether traumatic    Primary osteoarthritis of left knee    Other orders  -     Large Joint  Arthrocentesis: L subacromial bursa          I ordered and reviewed the HIEN today.     Dictated utilizing Dragon dictation

## 2020-07-31 PROBLEM — M19.012 PRIMARY OSTEOARTHRITIS, LEFT SHOULDER: Status: ACTIVE | Noted: 2020-07-31

## 2020-07-31 PROBLEM — M75.122 COMPLETE TEAR OF LEFT ROTATOR CUFF: Status: ACTIVE | Noted: 2020-07-31

## 2020-07-31 RX ORDER — TRIAMCINOLONE ACETONIDE 40 MG/ML
80 INJECTION, SUSPENSION INTRA-ARTICULAR; INTRAMUSCULAR
Status: COMPLETED | OUTPATIENT
Start: 2020-07-30 | End: 2020-07-30

## 2020-07-31 RX ORDER — LIDOCAINE HYDROCHLORIDE 10 MG/ML
4 INJECTION, SOLUTION EPIDURAL; INFILTRATION; INTRACAUDAL; PERINEURAL
Status: COMPLETED | OUTPATIENT
Start: 2020-07-30 | End: 2020-07-30

## 2020-08-22 DIAGNOSIS — F41.9 ANXIETY: ICD-10-CM

## 2020-08-24 RX ORDER — CLONAZEPAM 0.5 MG/1
TABLET ORAL
Qty: 60 TABLET | Refills: 0 | Status: SHIPPED | OUTPATIENT
Start: 2020-08-24 | End: 2020-01-01

## 2020-09-02 RX ORDER — MEMANTINE HYDROCHLORIDE 10 MG/1
TABLET ORAL
Qty: 90 TABLET | Refills: 0 | Status: SHIPPED | OUTPATIENT
Start: 2020-09-02 | End: 2020-01-01

## 2020-09-03 ENCOUNTER — OFFICE VISIT (OUTPATIENT)
Dept: INTERNAL MEDICINE | Facility: CLINIC | Age: 75
End: 2020-09-03

## 2020-09-03 VITALS
BODY MASS INDEX: 40.96 KG/M2 | TEMPERATURE: 97.5 F | SYSTOLIC BLOOD PRESSURE: 110 MMHG | HEART RATE: 70 BPM | DIASTOLIC BLOOD PRESSURE: 70 MMHG | RESPIRATION RATE: 16 BRPM | HEIGHT: 55 IN | WEIGHT: 177 LBS | OXYGEN SATURATION: 96 %

## 2020-09-03 DIAGNOSIS — K21.9 GASTROESOPHAGEAL REFLUX DISEASE WITHOUT ESOPHAGITIS: ICD-10-CM

## 2020-09-03 DIAGNOSIS — M54.50 CHRONIC MIDLINE LOW BACK PAIN, UNSPECIFIED WHETHER SCIATICA PRESENT: ICD-10-CM

## 2020-09-03 DIAGNOSIS — G89.29 CHRONIC MIDLINE LOW BACK PAIN, UNSPECIFIED WHETHER SCIATICA PRESENT: ICD-10-CM

## 2020-09-03 DIAGNOSIS — N18.30 CKD (CHRONIC KIDNEY DISEASE) STAGE 3, GFR 30-59 ML/MIN (HCC): ICD-10-CM

## 2020-09-03 DIAGNOSIS — Z23 NEED FOR INFLUENZA VACCINATION: Primary | ICD-10-CM

## 2020-09-03 DIAGNOSIS — Z00.00 MEDICARE ANNUAL WELLNESS VISIT, SUBSEQUENT: ICD-10-CM

## 2020-09-03 DIAGNOSIS — Z23 NEED FOR PNEUMOCOCCAL VACCINATION: ICD-10-CM

## 2020-09-03 DIAGNOSIS — E78.5 HYPERLIPIDEMIA, UNSPECIFIED HYPERLIPIDEMIA TYPE: ICD-10-CM

## 2020-09-03 DIAGNOSIS — D64.9 ANEMIA, UNSPECIFIED TYPE: ICD-10-CM

## 2020-09-03 DIAGNOSIS — G89.29 CHRONIC PAIN OF LEFT KNEE: ICD-10-CM

## 2020-09-03 DIAGNOSIS — M25.562 CHRONIC PAIN OF LEFT KNEE: ICD-10-CM

## 2020-09-03 DIAGNOSIS — F41.8 DEPRESSION WITH ANXIETY: ICD-10-CM

## 2020-09-03 DIAGNOSIS — G30.1 LATE ONSET ALZHEIMER'S DISEASE WITHOUT BEHAVIORAL DISTURBANCE (HCC): ICD-10-CM

## 2020-09-03 DIAGNOSIS — F02.80 LATE ONSET ALZHEIMER'S DISEASE WITHOUT BEHAVIORAL DISTURBANCE (HCC): ICD-10-CM

## 2020-09-03 DIAGNOSIS — R60.9 DEPENDENT EDEMA: ICD-10-CM

## 2020-09-03 DIAGNOSIS — Z00.00 ROUTINE HEALTH MAINTENANCE: ICD-10-CM

## 2020-09-03 PROCEDURE — 90653 IIV ADJUVANT VACCINE IM: CPT | Performed by: FAMILY MEDICINE

## 2020-09-03 PROCEDURE — 99214 OFFICE O/P EST MOD 30 MIN: CPT | Performed by: FAMILY MEDICINE

## 2020-09-03 PROCEDURE — G0439 PPPS, SUBSEQ VISIT: HCPCS | Performed by: FAMILY MEDICINE

## 2020-09-03 PROCEDURE — G0008 ADMIN INFLUENZA VIRUS VAC: HCPCS | Performed by: FAMILY MEDICINE

## 2020-09-03 RX ORDER — FLUOXETINE 20 MG/1
40 TABLET, FILM COATED ORAL DAILY
Qty: 60 TABLET | Refills: 5 | Status: SHIPPED | OUTPATIENT
Start: 2020-09-03 | End: 2021-01-01

## 2020-09-03 NOTE — PROGRESS NOTES
The ABCs of the Annual Wellness Visit  Subsequent Medicare Wellness Visit    Chief Complaint   Patient presents with   • Medicare Wellness-subsequent   • Hyperlipidemia   • Chronic Kidney Disease   • Memory Loss       Subjective   History of Present Illness:  Sandra Mane is a 75 y.o. female who presents for a Subsequent Medicare Wellness Visit.    HEALTH RISK ASSESSMENT    Recent Hospitalizations:  No hospitalization(s) within the last year.    Current Medical Providers:  Patient Care Team:  Sriram Blanton MD as PCP - General (Family Medicine)  Shad Anaya MD as Consulting Physician (Pain Medicine)    Smoking Status:  Social History     Tobacco Use   Smoking Status Never Smoker   Smokeless Tobacco Never Used       Alcohol Consumption:  Social History     Substance and Sexual Activity   Alcohol Use No       Depression Screen:   PHQ-2/PHQ-9 Depression Screening 9/3/2020   Little interest or pleasure in doing things 1   Feeling down, depressed, or hopeless 1   Trouble falling or staying asleep, or sleeping too much 1   Feeling tired or having little energy 1   Poor appetite or overeating 3   Feeling bad about yourself - or that you are a failure or have let yourself or your family down 2   Trouble concentrating on things, such as reading the newspaper or watching television 3   Moving or speaking so slowly that other people could have noticed. Or the opposite - being so fidgety or restless that you have been moving around a lot more than usual 2   Thoughts that you would be better off dead, or of hurting yourself in some way 2   Total Score 16   If you checked off any problems, how difficult have these problems made it for you to do your work, take care of things at home, or get along with other people? Extremely dIfficult       Fall Risk Screen:  STEADI Fall Risk Assessment was completed, and patient is at HIGH risk for falls. Assessment completed on:9/3/2020    Health Habits and Functional and  Cognitive Screening:  Functional & Cognitive Status 9/3/2020   Do you have difficulty preparing food and eating? No   Do you have difficulty bathing yourself, getting dressed or grooming yourself? Yes   Do you have difficulty using the toilet? Yes   Do you have difficulty moving around from place to place? Yes   Do you have trouble with steps or getting out of a bed or a chair? Yes   Current Diet Well Balanced Diet   Dental Exam Not up to date   Eye Exam Not up to date   Exercise (times per week) 0 times per week   Current Exercise Activities Include None   Do you need help using the phone?  No   Are you deaf or do you have serious difficulty hearing?  Yes   Do you need help with transportation? Yes   Do you need help shopping? Yes   Do you need help preparing meals?  Yes   Do you need help with housework?  Yes   Do you need help with laundry? Yes   Do you need help managing money? Yes   Do you ever drive or ride in a car without wearing a seat belt? No   Have you felt unusual stress, anger or loneliness in the last month? Yes   Who do you live with? Spouse   If you need help, do you have trouble finding someone available to you? No   Have you been bothered in the last four weeks by sexual problems? No   Do you have difficulty concentrating, remembering or making decisions? Yes         Does the patient have evidence of cognitive impairment? Yes    Asprin use counseling:Does not need ASA (and currently is not on it)    Age-appropriate Screening Schedule:  Refer to the list below for future screening recommendations based on patient's age, sex and/or medical conditions. Orders for these recommended tests are listed in the plan section. The patient has been provided with a written plan.    Health Maintenance   Topic Date Due   • TDAP/TD VACCINES (1 - Tdap) 02/20/1956   • COLONOSCOPY  11/02/2020 (Originally 2/11/2016)   • ZOSTER VACCINE (1 of 2) 11/08/2020 (Originally 2/20/1997)   • LIPID PANEL  06/24/2021   • MAMMOGRAM   07/10/2021   • INFLUENZA VACCINE  Completed          The following portions of the patient's history were reviewed and updated as appropriate: allergies, current medications, past family history, past medical history, past social history, past surgical history and problem list.    Outpatient Medications Prior to Visit   Medication Sig Dispense Refill   • clonazePAM (KlonoPIN) 0.5 MG tablet TAKE TWO TABLETS BY MOUTH EVERY NIGHT AT BEDTIME AS NEEDED 60 tablet 0   • FLUoxetine (PROzac) 20 MG tablet TAKE ONE TABLET BY MOUTH DAILY 30 tablet 4   • fluticasone (FLONASE) 50 MCG/ACT nasal spray 2 sprays into the nostril(s) as directed by provider Daily.     • furosemide (LASIX) 20 MG tablet Take 2 tablets by mouth Daily. 60 tablet 4   • gabapentin (NEURONTIN) 400 MG capsule Take 400 mg by mouth every 4 (four) hours.     • memantine (NAMENDA) 10 MG tablet TAKE ONE TABLET BY MOUTH DAILY 90 tablet 0   • Morphine (MS CONTIN) 100 MG 12 hr tablet      • Morphine (MSIR) 30 MG tablet      • mupirocin (Bactroban) 2 % ointment Apply  topically to the appropriate area as directed 3 (Three) Times a Day. 30 g 0   • omeprazole (priLOSEC) 20 MG capsule Take 20 mg by mouth.     • potassium chloride (K-DUR) 10 MEQ CR tablet Take 2 tablets by mouth Daily. 60 tablet 4     No facility-administered medications prior to visit.        Patient Active Problem List   Diagnosis   • Depression with anxiety   • Back pain   • Arthritis   • CKD (chronic kidney disease) stage 3, GFR 30-59 ml/min (CMS/[facility])   • Hyperlipidemia   • GERD (gastroesophageal reflux disease)   • Diplopia   • Memory changes   • Snoring   • Anemia   • Head injury   • Late onset Alzheimer's disease without behavioral disturbance (CMS/[facility])   • Ecchymosis of right eye   • Dependent edema   • OAB (overactive bladder)   • Chronic pain of left knee   • Primary osteoarthritis of left knee   • Greater trochanteric bursitis of both hips   • Routine health maintenance   • Non-healing ulcer of  "lower leg, left, limited to breakdown of skin (CMS/[facility])   • Bilateral hip pain   • Complete tear of left rotator cuff   • Primary osteoarthritis, left shoulder       Advanced Care Planning:  ACP discussion was held with the patient during this visit. Patient does not have an advance directive, information provided.    Review of Systems    Compared to one year ago, the patient feels her physical health is the same.  Compared to one year ago, the patient feels her mental health is worse.    Reviewed chart for potential of high risk medication in the elderly: yes  Reviewed chart for potential of harmful drug interactions in the elderly:yes    Objective         Vitals:    09/03/20 1509   BP: 110/70   BP Location: Left arm   Patient Position: Sitting   Cuff Size: Large Adult   Pulse: 70   Resp: 16   Temp: 97.5 °F (36.4 °C)   TempSrc: Temporal   SpO2: 96%   Weight: 80.3 kg (177 lb)   Height: 139.7 cm (55\")       Body mass index is 41.14 kg/m².  Discussed the patient's BMI with her. The BMI is above average; no BMI management plan is appropriate..    Physical Exam    Lab Results   Component Value Date     (H) 06/24/2020    CHLPL 212 (H) 06/24/2020    TRIG 153 (H) 06/24/2020    HDL 54 06/24/2020     (H) 06/24/2020    VLDL 30.6 06/24/2020        Assessment/Plan   Medicare Risks and Personalized Health Plan  CMS Preventative Services Quick Reference  Advance Directive Discussion  Breast Cancer/Mammogram Screening  Depression/Dysphoria  Immunizations Discussed/Encouraged (specific immunizations; Influenza )  Inadequate Social Support, Isolation, Loneliness, Lack of Transportation, Financial Difficulties, or Caregiver Stress   Inactivity/Sedentary  Osteoprorosis Risk    The above risks/problems have been discussed with the patient.  Pertinent information has been shared with the patient in the After Visit Summary.  Follow up plans and orders are seen below in the Assessment/Plan Section.    Diagnoses and all " orders for this visit:    1. Need for influenza vaccination (Primary)  -     Fluad Quad 65+ yrs ([id number])    2. Need for pneumococcal vaccination  -     pneumococcal polysaccharide 23-valent (PNEUMOVAX-23) vaccine 0.5 mL    3. Medicare annual wellness visit, subsequent      Follow Up:  No follow-ups on file.     An After Visit Summary and PPPS were given to the patient.

## 2020-09-03 NOTE — PROGRESS NOTES
Subjective     Sandra Mane is a 75 y.o. female, who presents with a chief complaint of   Chief Complaint   Patient presents with   • Medicare Wellness-subsequent   • Hyperlipidemia   • Chronic Kidney Disease   • Memory Loss     Leg Swelling   Associated symptoms include arthralgias.   Anemia     Chronic Kidney Disease   Associated symptoms include arthralgias.   Hyperlipidemia     Depression Patient is not experiencing: nervousness/anxiety.    Memory Loss   Associated symptoms include arthralgias.   Knee Pain        1. Dementia.  Pt takes memantine.    2. Depression with anxiety.  Pt states she has had a flare in symptoms. She feels down and discouraged with her life.  Denies SI.    3. Chronic back pain.  She sees Dr. Anaya, who does epidural injections.     4. Left knee OA.  She has injections by DINORAH Hauser and is currently feeling good.    5. Leg swelling.  This has improved the past few days.    The following portions of the patient's history were reviewed and updated as appropriate: allergies, current medications, past family history, past medical history, past social history, past surgical history and problem list.    Allergies: Patient has no known allergies.    Review of Systems   Constitutional: Negative.    HENT: Negative.    Eyes: Negative.    Cardiovascular: Positive for leg swelling.   Gastrointestinal: Negative.    Endocrine: Negative.    Genitourinary: Negative.    Musculoskeletal: Positive for arthralgias and back pain.   Allergic/Immunologic: Negative.    Neurological: Negative.    Hematological: Negative.    Psychiatric/Behavioral: The patient is not nervous/anxious.      Objective     Wt Readings from Last 3 Encounters:   09/03/20 80.3 kg (177 lb)   07/21/20 88.9 kg (196 lb)   06/01/20 91 kg (200 lb 9.6 oz)     Temp Readings from Last 3 Encounters:   09/03/20 97.5 °F (36.4 °C) (Temporal)   07/21/20 97.3 °F (36.3 °C) (Temporal)   06/01/20 97.6 °F (36.4 °C) (Temporal)     BP Readings from  Last 3 Encounters:   09/03/20 110/70   07/21/20 (!) 170/102   06/01/20 120/78     Pulse Readings from Last 3 Encounters:   09/03/20 70   07/21/20 53   06/01/20 75     Body mass index is 41.14 kg/m².  SpO2 Readings from Last 3 Encounters:   02/26/18 96%   12/04/17 97%   10/10/17 95%       Physical Exam   Constitutional: She is oriented to person, place, and time. She appears well-developed and well-nourished.   Sitting in wheelchair.   HENT:   Head: Normocephalic and atraumatic.   Eyes: Conjunctivae and EOM are normal.   Neck: Neck supple.   Cardiovascular: Normal rate, regular rhythm and normal heart sounds. Exam reveals no gallop and no friction rub.   No murmur heard.  Pulmonary/Chest: Effort normal and breath sounds normal. No respiratory distress. She has no wheezes. She has no rales.   Abdominal: Soft. Bowel sounds are normal. There is no hepatosplenomegaly.   Musculoskeletal: She exhibits edema (1+ shins ). She exhibits no deformity.   Back not examined.   Lymphadenopathy:     She has no cervical adenopathy.   Neurological: She is alert and oriented to person, place, and time.   Skin: Skin is warm and dry. No rash noted.   Psychiatric: She has a normal mood and affect. Her behavior is normal.   Nursing note and vitals reviewed.      Assessment/Plan   Sandra was seen today for medicare wellness-subsequent, hyperlipidemia, chronic kidney disease and memory loss.    Diagnoses and all orders for this visit:    Need for influenza vaccination  -     Fluad Quad 65+ yrs ([id number])    Need for pneumococcal vaccination  -     pneumococcal polysaccharide 23-valent (PNEUMOVAX-23) vaccine 0.5 mL    Medicare annual wellness visit, subsequent    Hyperlipidemia, unspecified hyperlipidemia type    CKD (chronic kidney disease) stage 3, GFR 30-59 ml/min (CMS/[facility])    Depression with anxiety  -     FLUoxetine (PROzac) 20 MG tablet; Take 2 tablets by mouth Daily.    Chronic midline low back pain, unspecified whether sciatica  present    Anemia, unspecified type    Late onset Alzheimer's disease without behavioral disturbance (CMS/[facility])    Gastroesophageal reflux disease without esophagitis    Dependent edema    Chronic pain of left knee    Routine health maintenance      1. Hyperlipidemia.  Mild.  Controlled with lifestyle measures.  Last labs reviewed.    2. CKDIII.  Stable.  Avoid NSAIDs.    3. Depression with anxiety.  Not optimally controlled.  Increase fluoxetine from 20 mg to 40 mg daily.  Anticipatory guidance given.    4. Chronic back pain.  Stable.  Per pain management.  Dr. Anaya.    5. Anemia.  Chronic.  Stable.  Ferritin, B12, folate normal in the past.     6. Alzheimer's.  She has had neuropsych testing and neurology consult.  She saw Dr. Cole.  Continue memantine.    8. GERD. Controlled with omeprazole prn.    9. Dependent edema.  Continue furosemide 40 mg daily and potassium supplement 20 mEq daily.  Compression stockings declined.      10. Left knee OA.  Injections per ortho.    11. Routine health maint.  They received the Cologuard in the mail but never sent it in; she is thinking about it.  Mammogram due but she declines right now.  Flu and Pneumovax today.      Outpatient Medications Prior to Visit   Medication Sig Dispense Refill   • clonazePAM (KlonoPIN) 0.5 MG tablet TAKE TWO TABLETS BY MOUTH EVERY NIGHT AT BEDTIME AS NEEDED 60 tablet 0   • fluticasone (FLONASE) 50 MCG/ACT nasal spray 2 sprays into the nostril(s) as directed by provider Daily.     • furosemide (LASIX) 20 MG tablet Take 2 tablets by mouth Daily. 60 tablet 4   • gabapentin (NEURONTIN) 400 MG capsule Take 400 mg by mouth every 4 (four) hours.     • memantine (NAMENDA) 10 MG tablet TAKE ONE TABLET BY MOUTH DAILY 90 tablet 0   • Morphine (MS CONTIN) 100 MG 12 hr tablet      • Morphine (MSIR) 30 MG tablet      • omeprazole (priLOSEC) 20 MG capsule Take 20 mg by mouth.     • potassium chloride (K-DUR) 10 MEQ CR tablet Take 2 tablets by mouth Daily. 60  tablet 4   • FLUoxetine (PROzac) 20 MG tablet TAKE ONE TABLET BY MOUTH DAILY 30 tablet 4   • mupirocin (Bactroban) 2 % ointment Apply  topically to the appropriate area as directed 3 (Three) Times a Day. 30 g 0     No facility-administered medications prior to visit.      New Medications Ordered This Visit   Medications   • pneumococcal polysaccharide 23-valent (PNEUMOVAX-23) vaccine 0.5 mL   • FLUoxetine (PROzac) 20 MG tablet     Sig: Take 2 tablets by mouth Daily.     Dispense:  60 tablet     Refill:  5     [unfilled]  Medications Discontinued During This Encounter   Medication Reason   • mupirocin (Bactroban) 2 % ointment *Therapy completed   • FLUoxetine (PROzac) 20 MG tablet Reorder       Return in about 4 months (around 1/3/2021).

## 2020-09-03 NOTE — PATIENT INSTRUCTIONS
Advance Directive    Advance directives are legal documents that let you make choices ahead of time about your health care and medical treatment in case you become unable to communicate for yourself. Advance directives are a way for you to communicate your wishes to family, friends, and health care providers. This can help convey your decisions about end-of-life care if you become unable to communicate.  Discussing and writing advance directives should happen over time rather than all at once. Advance directives can be changed depending on your situation and what you want, even after you have signed the advance directives.  If you do not have an advance directive, some states assign family decision makers to act on your behalf based on how closely you are related to them. Each state has its own laws regarding advance directives. You may want to check with your health care provider, , or state representative about the laws in your state. There are different types of advance directives, such as:  · Medical power of .  · Living will.  · Do not resuscitate (DNR) or do not attempt resuscitation (DNAR) order.  Health care proxy and medical power of   A health care proxy, also called a health care agent, is a person who is appointed to make medical decisions for you in cases in which you are unable to make the decisions yourself. Generally, people choose someone they know well and trust to represent their preferences. Make sure to ask this person for an agreement to act as your proxy. A proxy may have to exercise judgment in the event of a medical decision for which your wishes are not known.  A medical power of  is a legal document that names your health care proxy. Depending on the laws in your state, after the document is written, it may also need to be:  · Signed.  · Notarized.  · Dated.  · Copied.  · Witnessed.  · Incorporated into your medical record.  You may also want to appoint  someone to manage your financial affairs in a situation in which you are unable to do so. This is called a durable power of  for finances. It is a separate legal document from the durable power of  for health care. You may choose the same person or someone different from your health care proxy to act as your agent in financial matters.  If you do not appoint a proxy, or if there is a concern that the proxy is not acting in your best interests, a court-appointed guardian may be designated to act on your behalf.  Living will  A living will is a set of instructions documenting your wishes about medical care when you cannot express them yourself. Health care providers should keep a copy of your living will in your medical record. You may want to give a copy to family members or friends. To alert caregivers in case of an emergency, you can place a card in your wallet to let them know that you have a living will and where they can find it. A living will is used if you become:  · Terminally ill.  · Incapacitated.  · Unable to communicate or make decisions.  Items to consider in your living will include:  · The use or non-use of life-sustaining equipment, such as dialysis machines and breathing machines (ventilators).  · A DNR or DNAR order, which is the instruction not to use cardiopulmonary resuscitation (CPR) if breathing or heartbeat stops.  · The use or non-use of tube feeding.  · Withholding of food and fluids.  · Comfort (palliative) care when the goal becomes comfort rather than a cure.  · Organ and tissue donation.  A living will does not give instructions for distributing your money and property if you should pass away. It is recommended that you seek the advice of a  when writing a will. Decisions about taxes, beneficiaries, and asset distribution will be legally binding. This process can relieve your family and friends of any concerns surrounding disputes or questions that may come up about  the distribution of your assets.  DNR or DNAR  A DNR or DNAR order is a request not to have CPR in the event that your heart stops beating or you stop breathing. If a DNR or DNAR order has not been made and shared, a health care provider will try to help any patient whose heart has stopped or who has stopped breathing. If you plan to have surgery, talk with your health care provider about how your DNR or DNAR order will be followed if problems occur.  Summary  · Advance directives are the legal documents that allow you to make choices ahead of time about your health care and medical treatment in case you become unable to communicate for yourself.  · The process of discussing and writing advance directives should happen over time. You can change the advance directives, even after you have signed them.  · Advance directives include DNR or DNAR orders, living renee, and designating an agent as your medical power of .  This information is not intended to replace advice given to you by your health care provider. Make sure you discuss any questions you have with your health care provider.  Document Released: 03/26/2009 Document Revised: 01/22/2020 Document Reviewed: 11/06/2017  [employer] Patient Education © 2020 [employer]

## 2021-01-01 ENCOUNTER — READMISSION MANAGEMENT (OUTPATIENT)
Dept: CALL CENTER | Facility: HOSPITAL | Age: 76
End: 2021-01-01

## 2021-01-01 ENCOUNTER — APPOINTMENT (OUTPATIENT)
Dept: MAMMOGRAPHY | Facility: HOSPITAL | Age: 76
End: 2021-01-01

## 2021-01-01 ENCOUNTER — TELEPHONE (OUTPATIENT)
Dept: INTERNAL MEDICINE | Facility: CLINIC | Age: 76
End: 2021-01-01

## 2021-01-01 ENCOUNTER — OFFICE VISIT (OUTPATIENT)
Dept: INTERNAL MEDICINE | Facility: CLINIC | Age: 76
End: 2021-01-01

## 2021-01-01 ENCOUNTER — PATIENT OUTREACH (OUTPATIENT)
Dept: CASE MANAGEMENT | Facility: OTHER | Age: 76
End: 2021-01-01

## 2021-01-01 ENCOUNTER — APPOINTMENT (OUTPATIENT)
Dept: WOUND CARE | Facility: HOSPITAL | Age: 76
End: 2021-01-01

## 2021-01-01 ENCOUNTER — HOSPITAL ENCOUNTER (OUTPATIENT)
Dept: GENERAL RADIOLOGY | Facility: HOSPITAL | Age: 76
Discharge: HOME | End: 2021-04-07
Admitting: NURSE PRACTITIONER

## 2021-01-01 ENCOUNTER — OFFICE VISIT (OUTPATIENT)
Dept: ORTHOPEDIC SURGERY | Facility: CLINIC | Age: 76
End: 2021-01-01

## 2021-01-01 ENCOUNTER — TRANSITIONAL CARE MANAGEMENT TELEPHONE ENCOUNTER (OUTPATIENT)
Dept: CALL CENTER | Facility: HOSPITAL | Age: 76
End: 2021-01-01

## 2021-01-01 ENCOUNTER — EPISODE CHANGES (OUTPATIENT)
Dept: CASE MANAGEMENT | Facility: OTHER | Age: 76
End: 2021-01-01

## 2021-01-01 ENCOUNTER — CLINICAL SUPPORT (OUTPATIENT)
Dept: ORTHOPEDIC SURGERY | Facility: CLINIC | Age: 76
End: 2021-01-01

## 2021-01-01 ENCOUNTER — HOSPITAL ENCOUNTER (OUTPATIENT)
Facility: HOSPITAL | Age: 76
Setting detail: OBSERVATION
Discharge: HOME HEALTH | End: 2021-05-17
Attending: EMERGENCY MEDICINE | Admitting: HOSPITALIST

## 2021-01-01 ENCOUNTER — APPOINTMENT (OUTPATIENT)
Dept: ULTRASOUND IMAGING | Facility: HOSPITAL | Age: 76
End: 2021-01-01

## 2021-01-01 ENCOUNTER — HOSPITAL ENCOUNTER (OUTPATIENT)
Dept: MAMMOGRAPHY | Facility: HOSPITAL | Age: 76
Discharge: HOME | End: 2021-01-28
Admitting: FAMILY MEDICINE

## 2021-01-01 ENCOUNTER — APPOINTMENT (OUTPATIENT)
Dept: CT IMAGING | Facility: HOSPITAL | Age: 76
End: 2021-01-01

## 2021-01-01 ENCOUNTER — HOSPITAL ENCOUNTER (EMERGENCY)
Facility: HOSPITAL | Age: 76
Discharge: HOME | End: 2021-04-14
Attending: EMERGENCY MEDICINE | Admitting: EMERGENCY MEDICINE

## 2021-01-01 ENCOUNTER — TRANSCRIBE ORDERS (OUTPATIENT)
Dept: ADMINISTRATIVE | Facility: HOSPITAL | Age: 76
End: 2021-01-01

## 2021-01-01 ENCOUNTER — HOSPITAL ENCOUNTER (EMERGENCY)
Facility: HOSPITAL | Age: 76
Discharge: HOME | End: 2021-06-21
Attending: EMERGENCY MEDICINE | Admitting: EMERGENCY MEDICINE

## 2021-01-01 ENCOUNTER — HOSPITAL ENCOUNTER (OUTPATIENT)
Facility: HOSPITAL | Age: 76
Setting detail: OBSERVATION
Discharge: HOME HEALTH | End: 2021-03-08
Attending: INTERNAL MEDICINE | Admitting: INTERNAL MEDICINE

## 2021-01-01 ENCOUNTER — APPOINTMENT (OUTPATIENT)
Dept: GENERAL RADIOLOGY | Facility: HOSPITAL | Age: 76
End: 2021-01-01

## 2021-01-01 VITALS
HEIGHT: 65 IN | BODY MASS INDEX: 26.12 KG/M2 | RESPIRATION RATE: 19 BRPM | TEMPERATURE: 96.6 F | HEART RATE: 71 BPM | OXYGEN SATURATION: 97 % | WEIGHT: 156.8 LBS | SYSTOLIC BLOOD PRESSURE: 130 MMHG | DIASTOLIC BLOOD PRESSURE: 80 MMHG

## 2021-01-01 VITALS
OXYGEN SATURATION: 95 % | SYSTOLIC BLOOD PRESSURE: 172 MMHG | HEIGHT: 65 IN | RESPIRATION RATE: 16 BRPM | BODY MASS INDEX: 29.16 KG/M2 | HEART RATE: 57 BPM | DIASTOLIC BLOOD PRESSURE: 67 MMHG | TEMPERATURE: 98.3 F | WEIGHT: 175 LBS

## 2021-01-01 VITALS — BODY MASS INDEX: 29.16 KG/M2 | HEIGHT: 65 IN | WEIGHT: 175 LBS

## 2021-01-01 VITALS
HEART RATE: 53 BPM | OXYGEN SATURATION: 98 % | BODY MASS INDEX: 27.49 KG/M2 | RESPIRATION RATE: 16 BRPM | SYSTOLIC BLOOD PRESSURE: 120 MMHG | TEMPERATURE: 98.5 F | DIASTOLIC BLOOD PRESSURE: 70 MMHG | HEIGHT: 65 IN | WEIGHT: 165 LBS

## 2021-01-01 VITALS
TEMPERATURE: 97.3 F | HEART RATE: 62 BPM | RESPIRATION RATE: 16 BRPM | SYSTOLIC BLOOD PRESSURE: 122 MMHG | BODY MASS INDEX: 38.88 KG/M2 | WEIGHT: 168 LBS | DIASTOLIC BLOOD PRESSURE: 68 MMHG | OXYGEN SATURATION: 97 % | HEIGHT: 55 IN

## 2021-01-01 VITALS
DIASTOLIC BLOOD PRESSURE: 86 MMHG | WEIGHT: 176 LBS | HEART RATE: 63 BPM | BODY MASS INDEX: 29.32 KG/M2 | SYSTOLIC BLOOD PRESSURE: 182 MMHG | OXYGEN SATURATION: 92 % | RESPIRATION RATE: 18 BRPM | TEMPERATURE: 98.7 F | HEIGHT: 65 IN

## 2021-01-01 VITALS — HEIGHT: 65 IN | BODY MASS INDEX: 27.32 KG/M2 | WEIGHT: 164 LBS

## 2021-01-01 VITALS
HEART RATE: 68 BPM | TEMPERATURE: 97.1 F | DIASTOLIC BLOOD PRESSURE: 64 MMHG | WEIGHT: 165 LBS | HEIGHT: 65 IN | OXYGEN SATURATION: 97 % | SYSTOLIC BLOOD PRESSURE: 98 MMHG | BODY MASS INDEX: 27.49 KG/M2 | RESPIRATION RATE: 16 BRPM

## 2021-01-01 VITALS
TEMPERATURE: 97.3 F | WEIGHT: 172 LBS | SYSTOLIC BLOOD PRESSURE: 132 MMHG | DIASTOLIC BLOOD PRESSURE: 62 MMHG | BODY MASS INDEX: 28.66 KG/M2 | HEIGHT: 65 IN | OXYGEN SATURATION: 98 % | HEART RATE: 65 BPM

## 2021-01-01 VITALS
RESPIRATION RATE: 19 BRPM | WEIGHT: 164.3 LBS | SYSTOLIC BLOOD PRESSURE: 136 MMHG | TEMPERATURE: 96.8 F | HEIGHT: 65 IN | DIASTOLIC BLOOD PRESSURE: 72 MMHG | BODY MASS INDEX: 27.38 KG/M2 | OXYGEN SATURATION: 95 % | HEART RATE: 66 BPM

## 2021-01-01 VITALS
OXYGEN SATURATION: 97 % | HEART RATE: 59 BPM | SYSTOLIC BLOOD PRESSURE: 147 MMHG | TEMPERATURE: 98.6 F | DIASTOLIC BLOOD PRESSURE: 71 MMHG | HEIGHT: 64 IN | BODY MASS INDEX: 27.85 KG/M2 | RESPIRATION RATE: 18 BRPM | WEIGHT: 163.14 LBS

## 2021-01-01 VITALS
TEMPERATURE: 97.1 F | DIASTOLIC BLOOD PRESSURE: 60 MMHG | WEIGHT: 178 LBS | BODY MASS INDEX: 41.19 KG/M2 | HEIGHT: 55 IN | OXYGEN SATURATION: 98 % | HEART RATE: 82 BPM | SYSTOLIC BLOOD PRESSURE: 98 MMHG | RESPIRATION RATE: 16 BRPM

## 2021-01-01 VITALS
OXYGEN SATURATION: 97 % | TEMPERATURE: 98.2 F | DIASTOLIC BLOOD PRESSURE: 80 MMHG | WEIGHT: 167 LBS | HEIGHT: 64 IN | BODY MASS INDEX: 28.51 KG/M2 | HEART RATE: 64 BPM | SYSTOLIC BLOOD PRESSURE: 100 MMHG | RESPIRATION RATE: 16 BRPM

## 2021-01-01 VITALS — BODY MASS INDEX: 38.88 KG/M2 | WEIGHT: 168 LBS | HEIGHT: 55 IN

## 2021-01-01 VITALS — WEIGHT: 163 LBS | HEIGHT: 64 IN | BODY MASS INDEX: 27.83 KG/M2

## 2021-01-01 VITALS — HEIGHT: 65 IN | BODY MASS INDEX: 27.49 KG/M2 | WEIGHT: 165 LBS

## 2021-01-01 DIAGNOSIS — M75.122 COMPLETE TEAR OF LEFT ROTATOR CUFF, UNSPECIFIED WHETHER TRAUMATIC: ICD-10-CM

## 2021-01-01 DIAGNOSIS — R65.20 SEPSIS WITH ACUTE RENAL FAILURE, DUE TO UNSPECIFIED ORGANISM, UNSPECIFIED ACUTE RENAL FAILURE TYPE, UNSPECIFIED WHETHER SEPTIC SHOCK PRESENT (HCC): ICD-10-CM

## 2021-01-01 DIAGNOSIS — A41.9 SEPSIS WITH ACUTE RENAL FAILURE, DUE TO UNSPECIFIED ORGANISM, UNSPECIFIED ACUTE RENAL FAILURE TYPE, UNSPECIFIED WHETHER SEPTIC SHOCK PRESENT (HCC): ICD-10-CM

## 2021-01-01 DIAGNOSIS — M70.61 GREATER TROCHANTERIC BURSITIS OF BOTH HIPS: ICD-10-CM

## 2021-01-01 DIAGNOSIS — M25.551 RIGHT HIP PAIN: ICD-10-CM

## 2021-01-01 DIAGNOSIS — Z12.31 SCREENING MAMMOGRAM, ENCOUNTER FOR: ICD-10-CM

## 2021-01-01 DIAGNOSIS — R82.998 OTHER ABNORMAL FINDINGS IN URINE: ICD-10-CM

## 2021-01-01 DIAGNOSIS — D64.9 ANEMIA, UNSPECIFIED TYPE: ICD-10-CM

## 2021-01-01 DIAGNOSIS — K21.9 GASTROESOPHAGEAL REFLUX DISEASE WITHOUT ESOPHAGITIS: ICD-10-CM

## 2021-01-01 DIAGNOSIS — G89.29 CHRONIC MIDLINE LOW BACK PAIN, UNSPECIFIED WHETHER SCIATICA PRESENT: ICD-10-CM

## 2021-01-01 DIAGNOSIS — M54.50 CHRONIC MIDLINE LOW BACK PAIN, UNSPECIFIED WHETHER SCIATICA PRESENT: ICD-10-CM

## 2021-01-01 DIAGNOSIS — Z12.11 ENCOUNTER FOR SCREENING FOR MALIGNANT NEOPLASM OF COLON: ICD-10-CM

## 2021-01-01 DIAGNOSIS — R60.9 DEPENDENT EDEMA: ICD-10-CM

## 2021-01-01 DIAGNOSIS — N18.30 STAGE 3 CHRONIC KIDNEY DISEASE, UNSPECIFIED WHETHER STAGE 3A OR 3B CKD (HCC): ICD-10-CM

## 2021-01-01 DIAGNOSIS — M17.12 PRIMARY OSTEOARTHRITIS OF LEFT KNEE: Primary | ICD-10-CM

## 2021-01-01 DIAGNOSIS — Z12.31 ENCOUNTER FOR SCREENING MAMMOGRAM FOR MALIGNANT NEOPLASM OF BREAST: ICD-10-CM

## 2021-01-01 DIAGNOSIS — F41.8 DEPRESSION WITH ANXIETY: ICD-10-CM

## 2021-01-01 DIAGNOSIS — R41.82 ALTERED MENTAL STATUS, UNSPECIFIED ALTERED MENTAL STATUS TYPE: ICD-10-CM

## 2021-01-01 DIAGNOSIS — R52 PAIN: Primary | ICD-10-CM

## 2021-01-01 DIAGNOSIS — R63.4 WEIGHT LOSS: ICD-10-CM

## 2021-01-01 DIAGNOSIS — R60.9 PERIPHERAL EDEMA: Primary | ICD-10-CM

## 2021-01-01 DIAGNOSIS — N39.0 URINARY TRACT INFECTION IN FEMALE: Primary | ICD-10-CM

## 2021-01-01 DIAGNOSIS — R79.89 ELEVATED TSH: ICD-10-CM

## 2021-01-01 DIAGNOSIS — E78.5 HYPERLIPIDEMIA, UNSPECIFIED HYPERLIPIDEMIA TYPE: Primary | ICD-10-CM

## 2021-01-01 DIAGNOSIS — M19.012 PRIMARY OSTEOARTHRITIS, LEFT SHOULDER: ICD-10-CM

## 2021-01-01 DIAGNOSIS — M19.019 AC JOINT ARTHROPATHY: ICD-10-CM

## 2021-01-01 DIAGNOSIS — J18.9 PNEUMONIA OF LEFT LOWER LOBE DUE TO INFECTIOUS ORGANISM: ICD-10-CM

## 2021-01-01 DIAGNOSIS — J18.9 PNEUMONIA OF BOTH LUNGS DUE TO INFECTIOUS ORGANISM, UNSPECIFIED PART OF LUNG: Primary | ICD-10-CM

## 2021-01-01 DIAGNOSIS — N30.00 ACUTE CYSTITIS WITHOUT HEMATURIA: ICD-10-CM

## 2021-01-01 DIAGNOSIS — Z12.31 SCREENING MAMMOGRAM, ENCOUNTER FOR: Primary | ICD-10-CM

## 2021-01-01 DIAGNOSIS — F02.80 LATE ONSET ALZHEIMER'S DISEASE WITHOUT BEHAVIORAL DISTURBANCE (HCC): ICD-10-CM

## 2021-01-01 DIAGNOSIS — N39.0 URINARY TRACT INFECTION WITHOUT HEMATURIA, SITE UNSPECIFIED: Primary | ICD-10-CM

## 2021-01-01 DIAGNOSIS — Z00.00 ROUTINE HEALTH MAINTENANCE: ICD-10-CM

## 2021-01-01 DIAGNOSIS — M70.62 GREATER TROCHANTERIC BURSITIS OF BOTH HIPS: ICD-10-CM

## 2021-01-01 DIAGNOSIS — I50.32 CHRONIC DIASTOLIC (CONGESTIVE) HEART FAILURE (HCC): ICD-10-CM

## 2021-01-01 DIAGNOSIS — S81.801D OPEN LEG WOUND, RIGHT, SUBSEQUENT ENCOUNTER: ICD-10-CM

## 2021-01-01 DIAGNOSIS — N17.9 SEPSIS DUE TO ESCHERICHIA COLI WITH ACUTE RENAL FAILURE, UNSPECIFIED ACUTE RENAL FAILURE TYPE, UNSPECIFIED WHETHER SEPTIC SHOCK PRESENT (HCC): Primary | ICD-10-CM

## 2021-01-01 DIAGNOSIS — S81.801S OPEN LEG WOUND, RIGHT, SEQUELA: ICD-10-CM

## 2021-01-01 DIAGNOSIS — T81.30XD DEHISCENCE OF WOUND OF SKIN, SUBSEQUENT ENCOUNTER: Primary | ICD-10-CM

## 2021-01-01 DIAGNOSIS — R60.9 DEPENDENT EDEMA: Primary | ICD-10-CM

## 2021-01-01 DIAGNOSIS — M19.012 PRIMARY OSTEOARTHRITIS, LEFT SHOULDER: Primary | ICD-10-CM

## 2021-01-01 DIAGNOSIS — M19.90 ARTHRITIS: ICD-10-CM

## 2021-01-01 DIAGNOSIS — G30.1 LATE ONSET ALZHEIMER'S DISEASE WITHOUT BEHAVIORAL DISTURBANCE (HCC): ICD-10-CM

## 2021-01-01 DIAGNOSIS — N17.9 SEPSIS WITH ACUTE RENAL FAILURE, DUE TO UNSPECIFIED ORGANISM, UNSPECIFIED ACUTE RENAL FAILURE TYPE, UNSPECIFIED WHETHER SEPTIC SHOCK PRESENT (HCC): ICD-10-CM

## 2021-01-01 DIAGNOSIS — M75.52 SUBACROMIAL BURSITIS OF LEFT SHOULDER JOINT: ICD-10-CM

## 2021-01-01 DIAGNOSIS — A41.51 SEPSIS DUE TO ESCHERICHIA COLI WITH ACUTE RENAL FAILURE, UNSPECIFIED ACUTE RENAL FAILURE TYPE, UNSPECIFIED WHETHER SEPTIC SHOCK PRESENT (HCC): Primary | ICD-10-CM

## 2021-01-01 DIAGNOSIS — M17.12 PRIMARY OSTEOARTHRITIS OF LEFT KNEE: ICD-10-CM

## 2021-01-01 DIAGNOSIS — S81.811A NONINFECTED SKIN TEAR OF RIGHT LOWER EXTREMITY, INITIAL ENCOUNTER: Primary | ICD-10-CM

## 2021-01-01 DIAGNOSIS — R65.20 SEPSIS DUE TO ESCHERICHIA COLI WITH ACUTE RENAL FAILURE, UNSPECIFIED ACUTE RENAL FAILURE TYPE, UNSPECIFIED WHETHER SEPTIC SHOCK PRESENT (HCC): Primary | ICD-10-CM

## 2021-01-01 DIAGNOSIS — S01.01XD LACERATION OF SCALP, SUBSEQUENT ENCOUNTER: ICD-10-CM

## 2021-01-01 DIAGNOSIS — M25.552 BILATERAL HIP PAIN: Primary | ICD-10-CM

## 2021-01-01 DIAGNOSIS — R73.03 PREDIABETES: ICD-10-CM

## 2021-01-01 DIAGNOSIS — F41.9 ANXIETY: ICD-10-CM

## 2021-01-01 DIAGNOSIS — M25.551 BILATERAL HIP PAIN: Primary | ICD-10-CM

## 2021-01-01 LAB
ALBUMIN SERPL-MCNC: 2.7 G/DL (ref 3.5–5.2)
ALBUMIN SERPL-MCNC: 2.9 G/DL (ref 3.5–5.2)
ALBUMIN SERPL-MCNC: 3 G/DL (ref 3.5–5.2)
ALBUMIN SERPL-MCNC: 3 G/DL (ref 3.5–5.2)
ALBUMIN SERPL-MCNC: 3.5 G/DL (ref 3.5–5.2)
ALBUMIN SERPL-MCNC: 3.6 G/DL (ref 3.5–5.2)
ALBUMIN SERPL-MCNC: 4.1 G/DL (ref 3.7–4.7)
ALBUMIN SERPL-MCNC: 4.2 G/DL (ref 3.5–5.2)
ALBUMIN SERPL-MCNC: 4.3 G/DL (ref 3.7–4.7)
ALBUMIN/GLOB SERPL: 0.9 G/DL
ALBUMIN/GLOB SERPL: 1 G/DL
ALBUMIN/GLOB SERPL: 1 G/DL
ALBUMIN/GLOB SERPL: 1.1 G/DL
ALBUMIN/GLOB SERPL: 1.3 {RATIO} (ref 1.2–2.2)
ALBUMIN/GLOB SERPL: 1.4 G/DL
ALBUMIN/GLOB SERPL: 1.5 {RATIO} (ref 1.2–2.2)
ALP SERPL-CCNC: 70 U/L (ref 39–117)
ALP SERPL-CCNC: 75 U/L (ref 39–117)
ALP SERPL-CCNC: 80 U/L (ref 39–117)
ALP SERPL-CCNC: 82 U/L (ref 39–117)
ALP SERPL-CCNC: 85 U/L (ref 39–117)
ALP SERPL-CCNC: 88 U/L (ref 39–117)
ALP SERPL-CCNC: 90 IU/L (ref 48–121)
ALP SERPL-CCNC: 91 U/L (ref 39–117)
ALP SERPL-CCNC: 96 IU/L (ref 39–117)
ALT SERPL W P-5'-P-CCNC: 18 U/L (ref 1–33)
ALT SERPL W P-5'-P-CCNC: 20 U/L (ref 1–33)
ALT SERPL W P-5'-P-CCNC: 23 U/L (ref 1–33)
ALT SERPL W P-5'-P-CCNC: 8 U/L (ref 1–33)
ALT SERPL W P-5'-P-CCNC: 8 U/L (ref 1–33)
ALT SERPL W P-5'-P-CCNC: 9 U/L (ref 1–33)
ALT SERPL-CCNC: 10 IU/L (ref 0–32)
ALT SERPL-CCNC: 16 U/L (ref 1–33)
ALT SERPL-CCNC: 19 IU/L (ref 0–32)
AMORPH URATE CRY URNS QL MICRO: ABNORMAL /HPF
AMPHET+METHAMPHET UR QL: NEGATIVE
AMPHETAMINES UR QL: NEGATIVE
ANION GAP SERPL CALCULATED.3IONS-SCNC: 11.9 MMOL/L (ref 5–15)
ANION GAP SERPL CALCULATED.3IONS-SCNC: 7 MMOL/L (ref 5–15)
ANION GAP SERPL CALCULATED.3IONS-SCNC: 7.7 MMOL/L (ref 5–15)
ANION GAP SERPL CALCULATED.3IONS-SCNC: 8 MMOL/L (ref 5–15)
ANION GAP SERPL CALCULATED.3IONS-SCNC: 9.6 MMOL/L (ref 5–15)
ANION GAP SERPL CALCULATED.3IONS-SCNC: 9.8 MMOL/L (ref 5–15)
APAP SERPL-MCNC: <5 MCG/ML (ref 0–30)
ARTERIAL PATENCY WRIST A: POSITIVE
AST SERPL-CCNC: 12 U/L (ref 1–32)
AST SERPL-CCNC: 14 U/L (ref 1–32)
AST SERPL-CCNC: 15 U/L (ref 1–32)
AST SERPL-CCNC: 19 IU/L (ref 0–40)
AST SERPL-CCNC: 22 U/L (ref 1–32)
AST SERPL-CCNC: 25 IU/L (ref 0–40)
AST SERPL-CCNC: 25 U/L (ref 1–32)
AST SERPL-CCNC: 47 U/L (ref 1–32)
AST SERPL-CCNC: 48 U/L (ref 1–32)
ATMOSPHERIC PRESS: 743 MMHG
BACTERIA SPEC AEROBE CULT: ABNORMAL
BACTERIA SPEC AEROBE CULT: NORMAL
BACTERIA UR CULT: ABNORMAL
BACTERIA UR CULT: ABNORMAL
BACTERIA UR QL AUTO: ABNORMAL /HPF
BARBITURATES UR QL SCN: NEGATIVE
BASE EXCESS BLDA CALC-SCNC: 3.6 MMOL/L (ref 0–2)
BASOPHILS # BLD AUTO: 0 X10E3/UL (ref 0–0.2)
BASOPHILS # BLD AUTO: 0 X10E3/UL (ref 0–0.2)
BASOPHILS # BLD AUTO: 0.02 10*3/MM3 (ref 0–0.2)
BASOPHILS # BLD AUTO: 0.03 10*3/MM3 (ref 0–0.2)
BASOPHILS # BLD AUTO: 0.04 10*3/MM3 (ref 0–0.2)
BASOPHILS NFR BLD AUTO: 0.2 % (ref 0–1.5)
BASOPHILS NFR BLD AUTO: 0.3 % (ref 0–1.5)
BASOPHILS NFR BLD AUTO: 0.6 % (ref 0–1.5)
BASOPHILS NFR BLD AUTO: 1 %
BASOPHILS NFR BLD AUTO: 1 %
BDY SITE: ABNORMAL
BENZODIAZ UR QL SCN: NEGATIVE
BILIRUB SERPL-MCNC: 0.2 MG/DL (ref 0–1.2)
BILIRUB SERPL-MCNC: 0.3 MG/DL (ref 0–1.2)
BILIRUB SERPL-MCNC: 0.3 MG/DL (ref 0–1.2)
BILIRUB SERPL-MCNC: 0.4 MG/DL (ref 0–1.2)
BILIRUB SERPL-MCNC: 0.5 MG/DL (ref 0–1.2)
BILIRUB UR QL STRIP: NEGATIVE
BILIRUB UR QL STRIP: NEGATIVE
BODY TEMPERATURE: 37 C
BUN SERPL-MCNC: 12 MG/DL (ref 8–23)
BUN SERPL-MCNC: 18 MG/DL (ref 8–23)
BUN SERPL-MCNC: 20 MG/DL (ref 8–23)
BUN SERPL-MCNC: 22 MG/DL (ref 8–27)
BUN SERPL-MCNC: 23 MG/DL (ref 8–23)
BUN SERPL-MCNC: 28 MG/DL (ref 8–23)
BUN SERPL-MCNC: 29 MG/DL (ref 8–27)
BUN SERPL-MCNC: 34 MG/DL (ref 8–23)
BUN SERPL-MCNC: 43 MG/DL (ref 8–23)
BUN/CREAT SERPL: 13.7 (ref 7–25)
BUN/CREAT SERPL: 15.3 (ref 7–25)
BUN/CREAT SERPL: 16 (ref 12–28)
BUN/CREAT SERPL: 17 (ref 7–25)
BUN/CREAT SERPL: 18 (ref 12–28)
BUN/CREAT SERPL: 19.3 (ref 7–25)
BUN/CREAT SERPL: 20.2 (ref 7–25)
BUN/CREAT SERPL: 22.8 (ref 7–25)
BUN/CREAT SERPL: 9.6 (ref 7–25)
BUPRENORPHINE SERPL-MCNC: NEGATIVE NG/ML
CALCIUM SERPL-MCNC: 9.1 MG/DL (ref 8.7–10.3)
CALCIUM SERPL-MCNC: 9.3 MG/DL (ref 8.7–10.3)
CALCIUM SERPL-MCNC: 9.5 MG/DL (ref 8.6–10.5)
CALCIUM SPEC-SCNC: 8 MG/DL (ref 8.6–10.5)
CALCIUM SPEC-SCNC: 8.1 MG/DL (ref 8.6–10.5)
CALCIUM SPEC-SCNC: 8.7 MG/DL (ref 8.6–10.5)
CALCIUM SPEC-SCNC: 8.7 MG/DL (ref 8.6–10.5)
CALCIUM SPEC-SCNC: 8.8 MG/DL (ref 8.6–10.5)
CALCIUM SPEC-SCNC: 9.1 MG/DL (ref 8.6–10.5)
CANNABINOIDS SERPL QL: NEGATIVE
CHLORIDE SERPL-SCNC: 101 MMOL/L (ref 98–107)
CHLORIDE SERPL-SCNC: 102 MMOL/L (ref 98–107)
CHLORIDE SERPL-SCNC: 103 MMOL/L (ref 98–107)
CHLORIDE SERPL-SCNC: 103 MMOL/L (ref 98–107)
CHLORIDE SERPL-SCNC: 105 MMOL/L (ref 98–107)
CHLORIDE SERPL-SCNC: 106 MMOL/L (ref 98–107)
CHLORIDE SERPL-SCNC: 111 MMOL/L (ref 98–107)
CHLORIDE SERPL-SCNC: 97 MMOL/L (ref 96–106)
CHLORIDE SERPL-SCNC: 98 MMOL/L (ref 96–106)
CHOLEST SERPL-MCNC: 236 MG/DL (ref 100–199)
CHOLEST SERPL-MCNC: 250 MG/DL (ref 100–199)
CHOLEST/HDLC SERPL: 3.7 RATIO (ref 0–4.4)
CLARITY UR: ABNORMAL
CLARITY UR: CLEAR
CO2 SERPL-SCNC: 23.1 MMOL/L (ref 22–29)
CO2 SERPL-SCNC: 24 MMOL/L (ref 20–29)
CO2 SERPL-SCNC: 25.3 MMOL/L (ref 22–29)
CO2 SERPL-SCNC: 26 MMOL/L (ref 20–29)
CO2 SERPL-SCNC: 26 MMOL/L (ref 22–29)
CO2 SERPL-SCNC: 26 MMOL/L (ref 22–29)
CO2 SERPL-SCNC: 26.5 MMOL/L (ref 22–29)
CO2 SERPL-SCNC: 28.2 MMOL/L (ref 22–29)
CO2 SERPL-SCNC: 28.4 MMOL/L (ref 22–29)
COCAINE UR QL: NEGATIVE
COLOR UR: YELLOW
COLOR UR: YELLOW
CREAT SERPL-MCNC: 1.25 MG/DL (ref 0.57–1)
CREAT SERPL-MCNC: 1.31 MG/DL (ref 0.57–1)
CREAT SERPL-MCNC: 1.31 MG/DL (ref 0.57–1)
CREAT SERPL-MCNC: 1.35 MG/DL (ref 0.57–1)
CREAT SERPL-MCNC: 1.35 MG/DL (ref 0.57–1)
CREAT SERPL-MCNC: 1.45 MG/DL (ref 0.57–1)
CREAT SERPL-MCNC: 1.49 MG/DL (ref 0.57–1)
CREAT SERPL-MCNC: 1.65 MG/DL (ref 0.57–1)
CREAT SERPL-MCNC: 2.13 MG/DL (ref 0.57–1)
D-LACTATE SERPL-SCNC: 0.6 MMOL/L (ref 0.5–2)
D-LACTATE SERPL-SCNC: 0.6 MMOL/L (ref 0.5–2)
D-LACTATE SERPL-SCNC: 1.1 MMOL/L (ref 0.5–2)
DEPRECATED RDW RBC AUTO: 50.7 FL (ref 37–54)
DEPRECATED RDW RBC AUTO: 52.3 FL (ref 37–54)
DEPRECATED RDW RBC AUTO: 52.5 FL (ref 37–54)
DEPRECATED RDW RBC AUTO: 52.7 FL (ref 37–54)
DEPRECATED RDW RBC AUTO: 52.7 FL (ref 37–54)
DEPRECATED RDW RBC AUTO: 53.1 FL (ref 37–54)
EOSINOPHIL # BLD AUTO: 0.06 10*3/MM3 (ref 0–0.4)
EOSINOPHIL # BLD AUTO: 0.09 10*3/MM3 (ref 0–0.4)
EOSINOPHIL # BLD AUTO: 0.12 10*3/MM3 (ref 0–0.4)
EOSINOPHIL # BLD AUTO: 0.13 10*3/MM3 (ref 0–0.4)
EOSINOPHIL # BLD AUTO: 0.13 10*3/MM3 (ref 0–0.4)
EOSINOPHIL # BLD AUTO: 0.18 10*3/MM3 (ref 0–0.4)
EOSINOPHIL # BLD AUTO: 0.18 10*3/MM3 (ref 0–0.4)
EOSINOPHIL # BLD AUTO: 0.2 X10E3/UL (ref 0–0.4)
EOSINOPHIL # BLD AUTO: 0.2 X10E3/UL (ref 0–0.4)
EOSINOPHIL NFR BLD AUTO: 0.6 % (ref 0.3–6.2)
EOSINOPHIL NFR BLD AUTO: 0.9 % (ref 0.3–6.2)
EOSINOPHIL NFR BLD AUTO: 1.1 % (ref 0.3–6.2)
EOSINOPHIL NFR BLD AUTO: 1.4 % (ref 0.3–6.2)
EOSINOPHIL NFR BLD AUTO: 1.4 % (ref 0.3–6.2)
EOSINOPHIL NFR BLD AUTO: 1.9 % (ref 0.3–6.2)
EOSINOPHIL NFR BLD AUTO: 2.7 % (ref 0.3–6.2)
EOSINOPHIL NFR BLD AUTO: 3 %
EOSINOPHIL NFR BLD AUTO: 3 %
ERYTHROCYTE [DISTWIDTH] IN BLOOD BY AUTOMATED COUNT: 14 % (ref 11.7–15.4)
ERYTHROCYTE [DISTWIDTH] IN BLOOD BY AUTOMATED COUNT: 14.1 % (ref 11.7–15.4)
ERYTHROCYTE [DISTWIDTH] IN BLOOD BY AUTOMATED COUNT: 14.1 % (ref 12.3–15.4)
ERYTHROCYTE [DISTWIDTH] IN BLOOD BY AUTOMATED COUNT: 14.5 % (ref 12.3–15.4)
ERYTHROCYTE [DISTWIDTH] IN BLOOD BY AUTOMATED COUNT: 14.6 % (ref 12.3–15.4)
ERYTHROCYTE [DISTWIDTH] IN BLOOD BY AUTOMATED COUNT: 14.8 % (ref 12.3–15.4)
ERYTHROCYTE [DISTWIDTH] IN BLOOD BY AUTOMATED COUNT: 14.9 % (ref 12.3–15.4)
ETHANOL BLD-MCNC: <10 MG/DL (ref 0–10)
ETHANOL UR QL: <0.01 %
FLUAV RNA RESP QL NAA+PROBE: NOT DETECTED
FLUBV RNA RESP QL NAA+PROBE: NOT DETECTED
GAS FLOW AIRWAY: 1 LPM
GFR SERPL CREATININE-BSD FRML MDRD: 23 ML/MIN/1.73
GFR SERPL CREATININE-BSD FRML MDRD: 34 ML/MIN/1.73
GFR SERPL CREATININE-BSD FRML MDRD: 38 ML/MIN/1.73
GFR SERPL CREATININE-BSD FRML MDRD: 40 ML/MIN/1.73
GFR SERPL CREATININE-BSD FRML MDRD: 40 ML/MIN/1.73
GFR SERPL CREATININE-BSD FRML MDRD: 42 ML/MIN/1.73
GLOBULIN SER CALC-MCNC: 2.8 G/DL (ref 1.5–4.5)
GLOBULIN SER CALC-MCNC: 3.1 GM/DL
GLOBULIN SER CALC-MCNC: 3.3 G/DL (ref 1.5–4.5)
GLOBULIN UR ELPH-MCNC: 3.1 GM/DL
GLOBULIN UR ELPH-MCNC: 3.1 GM/DL
GLOBULIN UR ELPH-MCNC: 3.3 GM/DL
GLOBULIN UR ELPH-MCNC: 3.3 GM/DL
GLOBULIN UR ELPH-MCNC: 3.4 GM/DL
GLOBULIN UR ELPH-MCNC: 3.6 GM/DL
GLUCOSE SERPL-MCNC: 107 MG/DL (ref 65–99)
GLUCOSE SERPL-MCNC: 87 MG/DL (ref 65–99)
GLUCOSE SERPL-MCNC: 88 MG/DL (ref 65–99)
GLUCOSE SERPL-MCNC: 89 MG/DL (ref 65–99)
GLUCOSE SERPL-MCNC: 90 MG/DL (ref 65–99)
GLUCOSE SERPL-MCNC: 92 MG/DL (ref 65–99)
GLUCOSE SERPL-MCNC: 94 MG/DL (ref 65–99)
GLUCOSE SERPL-MCNC: 99 MG/DL (ref 65–99)
GLUCOSE SERPL-MCNC: ABNORMAL MG/DL
GLUCOSE UR STRIP-MCNC: NEGATIVE MG/DL
GLUCOSE UR STRIP-MCNC: NEGATIVE MG/DL
HBA1C MFR BLD: 5.2 % (ref 4.8–5.6)
HBA1C MFR BLD: 5.4 % (ref 4.8–5.6)
HCO3 BLDA-SCNC: 28.8 MMOL/L (ref 20–26)
HCT VFR BLD AUTO: 28.8 % (ref 34–46.6)
HCT VFR BLD AUTO: 29.4 % (ref 34–46.6)
HCT VFR BLD AUTO: 29.6 % (ref 34–46.6)
HCT VFR BLD AUTO: 30.1 % (ref 34–46.6)
HCT VFR BLD AUTO: 31.6 % (ref 34–46.6)
HCT VFR BLD AUTO: 32.1 % (ref 34–46.6)
HCT VFR BLD AUTO: 32.2 % (ref 34–46.6)
HCT VFR BLD AUTO: 33.2 % (ref 34–46.6)
HCT VFR BLD AUTO: 35.6 % (ref 34–46.6)
HDLC SERPL-MCNC: 43 MG/DL
HDLC SERPL-MCNC: 67 MG/DL
HGB BLD-MCNC: 10.2 G/DL (ref 12–15.9)
HGB BLD-MCNC: 10.4 G/DL (ref 11.1–15.9)
HGB BLD-MCNC: 10.4 G/DL (ref 11.1–15.9)
HGB BLD-MCNC: 10.8 G/DL (ref 12–15.9)
HGB BLD-MCNC: 8.8 G/DL (ref 12–15.9)
HGB BLD-MCNC: 8.9 G/DL (ref 12–15.9)
HGB BLD-MCNC: 9 G/DL (ref 12–15.9)
HGB BLD-MCNC: 9.2 G/DL (ref 12–15.9)
HGB BLD-MCNC: 9.9 G/DL (ref 12–15.9)
HGB BLDA-MCNC: 9.6 G/DL (ref 13.5–17.5)
HGB UR QL STRIP.AUTO: ABNORMAL
HGB UR QL STRIP.AUTO: NEGATIVE
HYALINE CASTS UR QL AUTO: ABNORMAL /LPF
IMM GRANULOCYTES # BLD AUTO: 0 X10E3/UL (ref 0–0.1)
IMM GRANULOCYTES # BLD AUTO: 0 X10E3/UL (ref 0–0.1)
IMM GRANULOCYTES # BLD AUTO: 0.02 10*3/MM3 (ref 0–0.05)
IMM GRANULOCYTES # BLD AUTO: 0.03 10*3/MM3 (ref 0–0.05)
IMM GRANULOCYTES # BLD AUTO: 0.03 10*3/MM3 (ref 0–0.05)
IMM GRANULOCYTES # BLD AUTO: 0.04 10*3/MM3 (ref 0–0.05)
IMM GRANULOCYTES # BLD AUTO: 0.06 10*3/MM3 (ref 0–0.05)
IMM GRANULOCYTES # BLD AUTO: 0.07 10*3/MM3 (ref 0–0.05)
IMM GRANULOCYTES # BLD AUTO: 0.09 10*3/MM3 (ref 0–0.05)
IMM GRANULOCYTES NFR BLD AUTO: 0 %
IMM GRANULOCYTES NFR BLD AUTO: 0 %
IMM GRANULOCYTES NFR BLD AUTO: 0.3 % (ref 0–0.5)
IMM GRANULOCYTES NFR BLD AUTO: 0.3 % (ref 0–0.5)
IMM GRANULOCYTES NFR BLD AUTO: 0.4 % (ref 0–0.5)
IMM GRANULOCYTES NFR BLD AUTO: 0.5 % (ref 0–0.5)
IMM GRANULOCYTES NFR BLD AUTO: 0.6 % (ref 0–0.5)
IMM GRANULOCYTES NFR BLD AUTO: 0.7 % (ref 0–0.5)
IMM GRANULOCYTES NFR BLD AUTO: 0.7 % (ref 0–0.5)
KETONES UR QL STRIP: NEGATIVE
KETONES UR QL STRIP: NEGATIVE
L PNEUMO1 AG UR QL IA: NEGATIVE
LDLC SERPL CALC-MCNC: 154 MG/DL (ref 0–99)
LDLC SERPL CALC-MCNC: 160 MG/DL (ref 0–99)
LDLC/HDLC SERPL: 3.6 RATIO (ref 0–3.2)
LEUKOCYTE ESTERASE UR QL STRIP.AUTO: ABNORMAL
LEUKOCYTE ESTERASE UR QL STRIP.AUTO: NEGATIVE
LYMPHOCYTES # BLD AUTO: 0.83 10*3/MM3 (ref 0.7–3.1)
LYMPHOCYTES # BLD AUTO: 1.16 10*3/MM3 (ref 0.7–3.1)
LYMPHOCYTES # BLD AUTO: 1.18 10*3/MM3 (ref 0.7–3.1)
LYMPHOCYTES # BLD AUTO: 1.2 10*3/MM3 (ref 0.7–3.1)
LYMPHOCYTES # BLD AUTO: 1.2 X10E3/UL (ref 0.7–3.1)
LYMPHOCYTES # BLD AUTO: 1.2 X10E3/UL (ref 0.7–3.1)
LYMPHOCYTES # BLD AUTO: 1.34 10*3/MM3 (ref 0.7–3.1)
LYMPHOCYTES # BLD AUTO: 1.35 10*3/MM3 (ref 0.7–3.1)
LYMPHOCYTES # BLD AUTO: 1.55 10*3/MM3 (ref 0.7–3.1)
LYMPHOCYTES NFR BLD AUTO: 11.3 % (ref 19.6–45.3)
LYMPHOCYTES NFR BLD AUTO: 13.2 % (ref 19.6–45.3)
LYMPHOCYTES NFR BLD AUTO: 14 % (ref 19.6–45.3)
LYMPHOCYTES NFR BLD AUTO: 17.5 % (ref 19.6–45.3)
LYMPHOCYTES NFR BLD AUTO: 22 %
LYMPHOCYTES NFR BLD AUTO: 22 %
LYMPHOCYTES NFR BLD AUTO: 23.8 % (ref 19.6–45.3)
LYMPHOCYTES NFR BLD AUTO: 8.2 % (ref 19.6–45.3)
LYMPHOCYTES NFR BLD AUTO: 9 % (ref 19.6–45.3)
Lab: ABNORMAL
MAGNESIUM SERPL-MCNC: 1.7 MG/DL (ref 1.6–2.4)
MAGNESIUM SERPL-MCNC: 1.8 MG/DL (ref 1.6–2.4)
MAGNESIUM SERPL-MCNC: 2.1 MG/DL (ref 1.6–2.4)
MCH RBC QN AUTO: 29.3 PG (ref 26.6–33)
MCH RBC QN AUTO: 29.5 PG (ref 26.6–33)
MCH RBC QN AUTO: 29.6 PG (ref 26.6–33)
MCH RBC QN AUTO: 29.7 PG (ref 26.6–33)
MCH RBC QN AUTO: 29.8 PG (ref 26.6–33)
MCH RBC QN AUTO: 29.9 PG (ref 26.6–33)
MCH RBC QN AUTO: 29.9 PG (ref 26.6–33)
MCH RBC QN AUTO: 30.2 PG (ref 26.6–33)
MCH RBC QN AUTO: 30.9 PG (ref 26.6–33)
MCHC RBC AUTO-ENTMCNC: 29.7 G/DL (ref 31.5–35.7)
MCHC RBC AUTO-ENTMCNC: 30.3 G/DL (ref 31.5–35.7)
MCHC RBC AUTO-ENTMCNC: 30.6 G/DL (ref 31.5–35.7)
MCHC RBC AUTO-ENTMCNC: 30.6 G/DL (ref 31.5–35.7)
MCHC RBC AUTO-ENTMCNC: 30.8 G/DL (ref 31.5–35.7)
MCHC RBC AUTO-ENTMCNC: 30.9 G/DL (ref 31.5–35.7)
MCHC RBC AUTO-ENTMCNC: 31.3 G/DL (ref 31.5–35.7)
MCHC RBC AUTO-ENTMCNC: 32.3 G/DL (ref 31.5–35.7)
MCHC RBC AUTO-ENTMCNC: 32.3 G/DL (ref 31.5–35.7)
MCV RBC AUTO: 91.9 FL (ref 79–97)
MCV RBC AUTO: 94 FL (ref 79–97)
MCV RBC AUTO: 95.7 FL (ref 79–97)
MCV RBC AUTO: 96 FL (ref 79–97)
MCV RBC AUTO: 97.7 FL (ref 79–97)
MCV RBC AUTO: 97.7 FL (ref 79–97)
MCV RBC AUTO: 97.9 FL (ref 79–97)
MCV RBC AUTO: 98.1 FL (ref 79–97)
MCV RBC AUTO: 98.7 FL (ref 79–97)
METHADONE UR QL SCN: NEGATIVE
MODALITY: ABNORMAL
MONOCYTES # BLD AUTO: 0.5 X10E3/UL (ref 0.1–0.9)
MONOCYTES # BLD AUTO: 0.5 X10E3/UL (ref 0.1–0.9)
MONOCYTES # BLD AUTO: 0.56 10*3/MM3 (ref 0.1–0.9)
MONOCYTES # BLD AUTO: 0.56 10*3/MM3 (ref 0.1–0.9)
MONOCYTES # BLD AUTO: 0.61 10*3/MM3 (ref 0.1–0.9)
MONOCYTES # BLD AUTO: 0.66 10*3/MM3 (ref 0.1–0.9)
MONOCYTES # BLD AUTO: 0.68 10*3/MM3 (ref 0.1–0.9)
MONOCYTES # BLD AUTO: 0.75 10*3/MM3 (ref 0.1–0.9)
MONOCYTES # BLD AUTO: 1.08 10*3/MM3 (ref 0.1–0.9)
MONOCYTES NFR BLD AUTO: 10 %
MONOCYTES NFR BLD AUTO: 10.7 % (ref 5–12)
MONOCYTES NFR BLD AUTO: 5 % (ref 5–12)
MONOCYTES NFR BLD AUTO: 5.7 % (ref 5–12)
MONOCYTES NFR BLD AUTO: 6.1 % (ref 5–12)
MONOCYTES NFR BLD AUTO: 7.8 % (ref 5–12)
MONOCYTES NFR BLD AUTO: 8.5 % (ref 5–12)
MONOCYTES NFR BLD AUTO: 9 %
MONOCYTES NFR BLD AUTO: 9.4 % (ref 5–12)
MRSA SPEC QL CULT: NORMAL
NEUTROPHILS # BLD AUTO: 3.6 X10E3/UL (ref 1.4–7)
NEUTROPHILS # BLD AUTO: 3.7 X10E3/UL (ref 1.4–7)
NEUTROPHILS # BLD AUTO: 4.19 10*3/MM3 (ref 1.7–7)
NEUTROPHILS NFR BLD AUTO: 11.03 10*3/MM3 (ref 1.7–7)
NEUTROPHILS NFR BLD AUTO: 4.67 10*3/MM3 (ref 1.7–7)
NEUTROPHILS NFR BLD AUTO: 64 %
NEUTROPHILS NFR BLD AUTO: 64.3 % (ref 42.7–76)
NEUTROPHILS NFR BLD AUTO: 65 %
NEUTROPHILS NFR BLD AUTO: 7.15 10*3/MM3 (ref 1.7–7)
NEUTROPHILS NFR BLD AUTO: 7.27 10*3/MM3 (ref 1.7–7)
NEUTROPHILS NFR BLD AUTO: 70.7 % (ref 42.7–76)
NEUTROPHILS NFR BLD AUTO: 75.7 % (ref 42.7–76)
NEUTROPHILS NFR BLD AUTO: 78.4 % (ref 42.7–76)
NEUTROPHILS NFR BLD AUTO: 8.07 10*3/MM3 (ref 1.7–7)
NEUTROPHILS NFR BLD AUTO: 80 % (ref 42.7–76)
NEUTROPHILS NFR BLD AUTO: 81.1 % (ref 42.7–76)
NEUTROPHILS NFR BLD AUTO: 84.2 % (ref 42.7–76)
NEUTROPHILS NFR BLD AUTO: 9.72 10*3/MM3 (ref 1.7–7)
NITRITE UR QL STRIP: NEGATIVE
NITRITE UR QL STRIP: POSITIVE
NRBC BLD AUTO-RTO: 0 /100 WBC (ref 0–0.2)
OPIATES UR QL: POSITIVE
OTHER ANTIBIOTIC SUSC ISLT: ABNORMAL
OXYCODONE UR QL SCN: NEGATIVE
PCO2 BLDA: 45.7 MM HG (ref 35–45)
PCO2 TEMP ADJ BLD: 45.7 MM HG (ref 35–45)
PCP UR QL SCN: NEGATIVE
PH BLDA: 7.41 PH UNITS (ref 7.35–7.45)
PH UR STRIP.AUTO: 6 [PH] (ref 4.5–8)
PH UR STRIP.AUTO: 6 [PH] (ref 4.5–8)
PH, TEMP CORRECTED: 7.41 PH UNITS (ref 7.35–7.45)
PHOSPHATE SERPL-MCNC: 2.3 MG/DL (ref 2.5–4.5)
PHOSPHATE SERPL-MCNC: 3.4 MG/DL (ref 2.5–4.5)
PHOSPHATE SERPL-MCNC: 3.6 MG/DL (ref 2.5–4.5)
PLATELET # BLD AUTO: 150 10*3/MM3 (ref 140–450)
PLATELET # BLD AUTO: 151 10*3/MM3 (ref 140–450)
PLATELET # BLD AUTO: 187 10*3/MM3 (ref 140–450)
PLATELET # BLD AUTO: 191 X10E3/UL (ref 150–450)
PLATELET # BLD AUTO: 201 10*3/MM3 (ref 140–450)
PLATELET # BLD AUTO: 204 10*3/MM3 (ref 140–450)
PLATELET # BLD AUTO: 228 X10E3/UL (ref 150–450)
PLATELET # BLD AUTO: 234 10*3/MM3 (ref 140–450)
PLATELET # BLD AUTO: 307 10*3/MM3 (ref 140–450)
PMV BLD AUTO: 10 FL (ref 6–12)
PMV BLD AUTO: 10.1 FL (ref 6–12)
PMV BLD AUTO: 9.5 FL (ref 6–12)
PMV BLD AUTO: 9.6 FL (ref 6–12)
PMV BLD AUTO: 9.7 FL (ref 6–12)
PMV BLD AUTO: 9.7 FL (ref 6–12)
PO2 BLDA: 68.3 MM HG (ref 83–108)
PO2 TEMP ADJ BLD: 68.3 MM HG (ref 83–108)
POTASSIUM SERPL-SCNC: 3.9 MMOL/L (ref 3.5–5.2)
POTASSIUM SERPL-SCNC: 4 MMOL/L (ref 3.5–5.2)
POTASSIUM SERPL-SCNC: 4.3 MMOL/L (ref 3.5–5.2)
POTASSIUM SERPL-SCNC: 4.5 MMOL/L (ref 3.5–5.2)
POTASSIUM SERPL-SCNC: 4.7 MMOL/L (ref 3.5–5.2)
POTASSIUM SERPL-SCNC: 4.8 MMOL/L (ref 3.5–5.2)
POTASSIUM SERPL-SCNC: ABNORMAL MMOL/L
PROCALCITONIN SERPL-MCNC: 0.36 NG/ML (ref 0–0.25)
PROCALCITONIN SERPL-MCNC: 0.53 NG/ML (ref 0–0.25)
PROCALCITONIN SERPL-MCNC: 3.38 NG/ML (ref 0–0.25)
PROCALCITONIN SERPL-MCNC: 6.5 NG/ML (ref 0–0.25)
PROPOXYPH UR QL: NEGATIVE
PROT SERPL-MCNC: 5.8 G/DL (ref 6–8.5)
PROT SERPL-MCNC: 6.1 G/DL (ref 6–8.5)
PROT SERPL-MCNC: 6.3 G/DL (ref 6–8.5)
PROT SERPL-MCNC: 6.3 G/DL (ref 6–8.5)
PROT SERPL-MCNC: 6.8 G/DL (ref 6–8.5)
PROT SERPL-MCNC: 6.9 G/DL (ref 6–8.5)
PROT SERPL-MCNC: 7.2 G/DL (ref 6–8.5)
PROT SERPL-MCNC: 7.3 G/DL (ref 6–8.5)
PROT SERPL-MCNC: 7.6 G/DL (ref 6–8.5)
PROT UR QL STRIP: NEGATIVE
PROT UR QL STRIP: NEGATIVE
QT INTERVAL: 414 MS
QT INTERVAL: 452 MS
QT INTERVAL: 485 MS
QT INTERVAL: 491 MS
RBC # BLD AUTO: 3 10*6/MM3 (ref 3.77–5.28)
RBC # BLD AUTO: 3.01 10*6/MM3 (ref 3.77–5.28)
RBC # BLD AUTO: 3.01 10*6/MM3 (ref 3.77–5.28)
RBC # BLD AUTO: 3.08 10*6/MM3 (ref 3.77–5.28)
RBC # BLD AUTO: 3.28 10*6/MM3 (ref 3.77–5.28)
RBC # BLD AUTO: 3.37 X10E6/UL (ref 3.77–5.28)
RBC # BLD AUTO: 3.44 10*6/MM3 (ref 3.77–5.28)
RBC # BLD AUTO: 3.52 X10E6/UL (ref 3.77–5.28)
RBC # BLD AUTO: 3.63 10*6/MM3 (ref 3.77–5.28)
RBC # UR: ABNORMAL /HPF
REF LAB TEST METHOD: ABNORMAL
S PNEUM AG SPEC QL LA: NEGATIVE
SALICYLATES SERPL-MCNC: <0.3 MG/DL
SAO2 % BLDCOA: 95.9 % (ref 94–99)
SARS-COV-2 RNA PNL SPEC NAA+PROBE: NOT DETECTED
SARS-COV-2 RNA RESP QL NAA+PROBE: NOT DETECTED
SODIUM SERPL-SCNC: 137 MMOL/L (ref 134–144)
SODIUM SERPL-SCNC: 137 MMOL/L (ref 136–145)
SODIUM SERPL-SCNC: 139 MMOL/L (ref 136–145)
SODIUM SERPL-SCNC: 141 MMOL/L (ref 134–144)
SODIUM SERPL-SCNC: 141 MMOL/L (ref 136–145)
SODIUM SERPL-SCNC: 141 MMOL/L (ref 136–145)
SODIUM SERPL-SCNC: 144 MMOL/L (ref 136–145)
SP GR UR STRIP: 1.01 (ref 1–1.03)
SP GR UR STRIP: 1.01 (ref 1–1.03)
SQUAMOUS #/AREA URNS HPF: ABNORMAL /HPF
T3FREE SERPL-MCNC: 1.66 PG/ML (ref 2–4.4)
T4 FREE SERPL-MCNC: 0.99 NG/DL (ref 0.93–1.7)
T4 FREE SERPL-MCNC: 1.15 NG/DL (ref 0.82–1.77)
TRICYCLICS UR QL SCN: NEGATIVE
TRIGL SERPL-MCNC: 129 MG/DL (ref 0–149)
TRIGL SERPL-MCNC: 215 MG/DL (ref 0–149)
TROPONIN T SERPL-MCNC: 0.01 NG/ML (ref 0–0.03)
TROPONIN T SERPL-MCNC: 0.02 NG/ML (ref 0–0.03)
TSH SERPL DL<=0.005 MIU/L-ACNC: 1.99 UIU/ML (ref 0.45–4.5)
TSH SERPL DL<=0.005 MIU/L-ACNC: 2.16 UIU/ML (ref 0.45–4.5)
TSH SERPL DL<=0.05 MIU/L-ACNC: 1.05 UIU/ML (ref 0.27–4.2)
TSH SERPL DL<=0.05 MIU/L-ACNC: 1.53 UIU/ML (ref 0.27–4.2)
UROBILINOGEN UR QL STRIP: ABNORMAL
UROBILINOGEN UR QL STRIP: NORMAL
VENTILATOR MODE: ABNORMAL
VIT B12 SERPL-MCNC: 322 PG/ML (ref 232–1245)
VLDLC SERPL CALC-MCNC: 23 MG/DL (ref 5–40)
VLDLC SERPL CALC-MCNC: 39 MG/DL (ref 5–40)
WBC # BLD AUTO: 10.09 10*3/MM3 (ref 3.4–10.8)
WBC # BLD AUTO: 11.97 10*3/MM3 (ref 3.4–10.8)
WBC # BLD AUTO: 13.11 10*3/MM3 (ref 3.4–10.8)
WBC # BLD AUTO: 5.5 X10E3/UL (ref 3.4–10.8)
WBC # BLD AUTO: 5.7 X10E3/UL (ref 3.4–10.8)
WBC # BLD AUTO: 6.51 10*3/MM3 (ref 3.4–10.8)
WBC # BLD AUTO: 6.61 10*3/MM3 (ref 3.4–10.8)
WBC # BLD AUTO: 9.12 10*3/MM3 (ref 3.4–10.8)
WBC # BLD AUTO: 9.6 10*3/MM3 (ref 3.4–10.8)
WBC UR QL AUTO: ABNORMAL /HPF

## 2021-01-01 PROCEDURE — 0 IOPAMIDOL PER 1 ML: Performed by: EMERGENCY MEDICINE

## 2021-01-01 PROCEDURE — G0378 HOSPITAL OBSERVATION PER HR: HCPCS

## 2021-01-01 PROCEDURE — 87899 AGENT NOS ASSAY W/OPTIC: CPT | Performed by: STUDENT IN AN ORGANIZED HEALTH CARE EDUCATION/TRAINING PROGRAM

## 2021-01-01 PROCEDURE — 87088 URINE BACTERIA CULTURE: CPT | Performed by: INTERNAL MEDICINE

## 2021-01-01 PROCEDURE — 82077 ASSAY SPEC XCP UR&BREATH IA: CPT | Performed by: EMERGENCY MEDICINE

## 2021-01-01 PROCEDURE — 96376 TX/PRO/DX INJ SAME DRUG ADON: CPT

## 2021-01-01 PROCEDURE — 20610 DRAIN/INJ JOINT/BURSA W/O US: CPT | Performed by: NURSE PRACTITIONER

## 2021-01-01 PROCEDURE — 99225 PR SBSQ OBSERVATION CARE/DAY 25 MINUTES: CPT | Performed by: INTERNAL MEDICINE

## 2021-01-01 PROCEDURE — 12004 RPR S/N/AX/GEN/TRK7.6-12.5CM: CPT | Performed by: EMERGENCY MEDICINE

## 2021-01-01 PROCEDURE — 36600 WITHDRAWAL OF ARTERIAL BLOOD: CPT

## 2021-01-01 PROCEDURE — C9803 HOPD COVID-19 SPEC COLLECT: HCPCS

## 2021-01-01 PROCEDURE — 80306 DRUG TEST PRSMV INSTRMNT: CPT | Performed by: EMERGENCY MEDICINE

## 2021-01-01 PROCEDURE — 99213 OFFICE O/P EST LOW 20 MIN: CPT | Performed by: NURSE PRACTITIONER

## 2021-01-01 PROCEDURE — 99214 OFFICE O/P EST MOD 30 MIN: CPT | Performed by: FAMILY MEDICINE

## 2021-01-01 PROCEDURE — G0180 MD CERTIFICATION HHA PATIENT: HCPCS | Performed by: FAMILY MEDICINE

## 2021-01-01 PROCEDURE — 96365 THER/PROPH/DIAG IV INF INIT: CPT

## 2021-01-01 PROCEDURE — 74177 CT ABD & PELVIS W/CONTRAST: CPT

## 2021-01-01 PROCEDURE — 87186 SC STD MICRODIL/AGAR DIL: CPT | Performed by: INTERNAL MEDICINE

## 2021-01-01 PROCEDURE — 99213 OFFICE O/P EST LOW 20 MIN: CPT | Performed by: FAMILY MEDICINE

## 2021-01-01 PROCEDURE — 1111F DSCHRG MED/CURRENT MED MERGE: CPT | Performed by: FAMILY MEDICINE

## 2021-01-01 PROCEDURE — 84145 PROCALCITONIN (PCT): CPT | Performed by: INTERNAL MEDICINE

## 2021-01-01 PROCEDURE — 93005 ELECTROCARDIOGRAM TRACING: CPT | Performed by: INTERNAL MEDICINE

## 2021-01-01 PROCEDURE — 80053 COMPREHEN METABOLIC PANEL: CPT | Performed by: INTERNAL MEDICINE

## 2021-01-01 PROCEDURE — 25010000002 CEFEPIME-DEXTROSE 2-5 GM-%(50ML) RECONSTITUTED SOLUTION: Performed by: EMERGENCY MEDICINE

## 2021-01-01 PROCEDURE — P9612 CATHETERIZE FOR URINE SPEC: HCPCS

## 2021-01-01 PROCEDURE — 74176 CT ABD & PELVIS W/O CONTRAST: CPT

## 2021-01-01 PROCEDURE — 92610 EVALUATE SWALLOWING FUNCTION: CPT

## 2021-01-01 PROCEDURE — 96361 HYDRATE IV INFUSION ADD-ON: CPT

## 2021-01-01 PROCEDURE — 94799 UNLISTED PULMONARY SVC/PX: CPT

## 2021-01-01 PROCEDURE — 25010000002 VANCOMYCIN PER 500 MG: Performed by: EMERGENCY MEDICINE

## 2021-01-01 PROCEDURE — 80053 COMPREHEN METABOLIC PANEL: CPT | Performed by: EMERGENCY MEDICINE

## 2021-01-01 PROCEDURE — 99285 EMERGENCY DEPT VISIT HI MDM: CPT

## 2021-01-01 PROCEDURE — 87081 CULTURE SCREEN ONLY: CPT | Performed by: STUDENT IN AN ORGANIZED HEALTH CARE EDUCATION/TRAINING PROGRAM

## 2021-01-01 PROCEDURE — 85025 COMPLETE CBC W/AUTO DIFF WBC: CPT | Performed by: INTERNAL MEDICINE

## 2021-01-01 PROCEDURE — 70450 CT HEAD/BRAIN W/O DYE: CPT

## 2021-01-01 PROCEDURE — 81001 URINALYSIS AUTO W/SCOPE: CPT | Performed by: PHYSICIAN ASSISTANT

## 2021-01-01 PROCEDURE — 71045 X-RAY EXAM CHEST 1 VIEW: CPT

## 2021-01-01 PROCEDURE — 25010000002 VANCOMYCIN PER 500 MG

## 2021-01-01 PROCEDURE — 84100 ASSAY OF PHOSPHORUS: CPT | Performed by: INTERNAL MEDICINE

## 2021-01-01 PROCEDURE — 87086 URINE CULTURE/COLONY COUNT: CPT | Performed by: INTERNAL MEDICINE

## 2021-01-01 PROCEDURE — 84484 ASSAY OF TROPONIN QUANT: CPT | Performed by: PHYSICIAN ASSISTANT

## 2021-01-01 PROCEDURE — 25010000003 LIDOCAINE 1 % SOLUTION: Performed by: RADIOLOGY

## 2021-01-01 PROCEDURE — 92526 ORAL FUNCTION THERAPY: CPT

## 2021-01-01 PROCEDURE — 25010000002 VANCOMYCIN 1 G RECONSTITUTED SOLUTION: Performed by: INTERNAL MEDICINE

## 2021-01-01 PROCEDURE — 96372 THER/PROPH/DIAG INJ SC/IM: CPT

## 2021-01-01 PROCEDURE — 25010000002 CEFEPIME-DEXTROSE 2-5 GM-%(50ML) RECONSTITUTED SOLUTION: Performed by: INTERNAL MEDICINE

## 2021-01-01 PROCEDURE — 25010000002 METHYLPREDNISOLONE PER 40 MG: Performed by: RADIOLOGY

## 2021-01-01 PROCEDURE — G0463 HOSPITAL OUTPT CLINIC VISIT: HCPCS

## 2021-01-01 PROCEDURE — 84145 PROCALCITONIN (PCT): CPT | Performed by: PHYSICIAN ASSISTANT

## 2021-01-01 PROCEDURE — 93005 ELECTROCARDIOGRAM TRACING: CPT | Performed by: PHYSICIAN ASSISTANT

## 2021-01-01 PROCEDURE — 25010000002 ENOXAPARIN PER 10 MG: Performed by: INTERNAL MEDICINE

## 2021-01-01 PROCEDURE — 99219 PR INITIAL OBSERVATION CARE/DAY 50 MINUTES: CPT | Performed by: STUDENT IN AN ORGANIZED HEALTH CARE EDUCATION/TRAINING PROGRAM

## 2021-01-01 PROCEDURE — 99282 EMERGENCY DEPT VISIT SF MDM: CPT

## 2021-01-01 PROCEDURE — 99217 PR OBSERVATION CARE DISCHARGE MANAGEMENT: CPT | Performed by: INTERNAL MEDICINE

## 2021-01-01 PROCEDURE — 96375 TX/PRO/DX INJ NEW DRUG ADDON: CPT

## 2021-01-01 PROCEDURE — 99214 OFFICE O/P EST MOD 30 MIN: CPT | Performed by: NURSE PRACTITIONER

## 2021-01-01 PROCEDURE — 51798 US URINE CAPACITY MEASURE: CPT

## 2021-01-01 PROCEDURE — 99283 EMERGENCY DEPT VISIT LOW MDM: CPT

## 2021-01-01 PROCEDURE — 93005 ELECTROCARDIOGRAM TRACING: CPT | Performed by: EMERGENCY MEDICINE

## 2021-01-01 PROCEDURE — 97165 OT EVAL LOW COMPLEX 30 MIN: CPT

## 2021-01-01 PROCEDURE — 81003 URINALYSIS AUTO W/O SCOPE: CPT | Performed by: EMERGENCY MEDICINE

## 2021-01-01 PROCEDURE — 77063 BREAST TOMOSYNTHESIS BI: CPT

## 2021-01-01 PROCEDURE — 97110 THERAPEUTIC EXERCISES: CPT | Performed by: PHYSICAL THERAPIST

## 2021-01-01 PROCEDURE — 51701 INSERT BLADDER CATHETER: CPT

## 2021-01-01 PROCEDURE — 83735 ASSAY OF MAGNESIUM: CPT | Performed by: INTERNAL MEDICINE

## 2021-01-01 PROCEDURE — 85025 COMPLETE CBC W/AUTO DIFF WBC: CPT | Performed by: EMERGENCY MEDICINE

## 2021-01-01 PROCEDURE — 84443 ASSAY THYROID STIM HORMONE: CPT | Performed by: INTERNAL MEDICINE

## 2021-01-01 PROCEDURE — 97530 THERAPEUTIC ACTIVITIES: CPT

## 2021-01-01 PROCEDURE — 83605 ASSAY OF LACTIC ACID: CPT | Performed by: PHYSICIAN ASSISTANT

## 2021-01-01 PROCEDURE — 93971 EXTREMITY STUDY: CPT

## 2021-01-01 PROCEDURE — 83036 HEMOGLOBIN GLYCOSYLATED A1C: CPT | Performed by: INTERNAL MEDICINE

## 2021-01-01 PROCEDURE — 80143 DRUG ASSAY ACETAMINOPHEN: CPT | Performed by: EMERGENCY MEDICINE

## 2021-01-01 PROCEDURE — 93010 ELECTROCARDIOGRAM REPORT: CPT | Performed by: INTERNAL MEDICINE

## 2021-01-01 PROCEDURE — 85025 COMPLETE CBC W/AUTO DIFF WBC: CPT | Performed by: PHYSICIAN ASSISTANT

## 2021-01-01 PROCEDURE — 80179 DRUG ASSAY SALICYLATE: CPT | Performed by: EMERGENCY MEDICINE

## 2021-01-01 PROCEDURE — 25010000002 NALOXONE PER 1 MG

## 2021-01-01 PROCEDURE — 25010000002 CEFTRIAXONE SODIUM-DEXTROSE 2-2.22 GM-%(50ML) RECONSTITUTED SOLUTION: Performed by: PHYSICIAN ASSISTANT

## 2021-01-01 PROCEDURE — 77067 SCR MAMMO BI INCL CAD: CPT

## 2021-01-01 PROCEDURE — 77002 NEEDLE LOCALIZATION BY XRAY: CPT

## 2021-01-01 PROCEDURE — 99285 EMERGENCY DEPT VISIT HI MDM: CPT | Performed by: EMERGENCY MEDICINE

## 2021-01-01 PROCEDURE — 84443 ASSAY THYROID STIM HORMONE: CPT | Performed by: EMERGENCY MEDICINE

## 2021-01-01 PROCEDURE — 87636 SARSCOV2 & INF A&B AMP PRB: CPT | Performed by: PHYSICIAN ASSISTANT

## 2021-01-01 PROCEDURE — 84481 FREE ASSAY (FT-3): CPT | Performed by: EMERGENCY MEDICINE

## 2021-01-01 PROCEDURE — 99217 PR OBSERVATION CARE DISCHARGE MANAGEMENT: CPT | Performed by: HOSPITALIST

## 2021-01-01 PROCEDURE — 51702 INSERT TEMP BLADDER CATH: CPT

## 2021-01-01 PROCEDURE — 83605 ASSAY OF LACTIC ACID: CPT | Performed by: EMERGENCY MEDICINE

## 2021-01-01 PROCEDURE — 80053 COMPREHEN METABOLIC PANEL: CPT | Performed by: PHYSICIAN ASSISTANT

## 2021-01-01 PROCEDURE — 25010000002 IOPAMIDOL 61 % SOLUTION: Performed by: RADIOLOGY

## 2021-01-01 PROCEDURE — 97116 GAIT TRAINING THERAPY: CPT

## 2021-01-01 PROCEDURE — 83605 ASSAY OF LACTIC ACID: CPT | Performed by: INTERNAL MEDICINE

## 2021-01-01 PROCEDURE — 87040 BLOOD CULTURE FOR BACTERIA: CPT | Performed by: EMERGENCY MEDICINE

## 2021-01-01 PROCEDURE — 73502 X-RAY EXAM HIP UNI 2-3 VIEWS: CPT | Performed by: NURSE PRACTITIONER

## 2021-01-01 PROCEDURE — 99233 SBSQ HOSP IP/OBS HIGH 50: CPT | Performed by: INTERNAL MEDICINE

## 2021-01-01 PROCEDURE — 82803 BLOOD GASES ANY COMBINATION: CPT

## 2021-01-01 PROCEDURE — 87635 SARS-COV-2 COVID-19 AMP PRB: CPT | Performed by: EMERGENCY MEDICINE

## 2021-01-01 PROCEDURE — 97161 PT EVAL LOW COMPLEX 20 MIN: CPT

## 2021-01-01 PROCEDURE — 99495 TRANSJ CARE MGMT MOD F2F 14D: CPT | Performed by: FAMILY MEDICINE

## 2021-01-01 PROCEDURE — 84145 PROCALCITONIN (PCT): CPT | Performed by: STUDENT IN AN ORGANIZED HEALTH CARE EDUCATION/TRAINING PROGRAM

## 2021-01-01 PROCEDURE — 87040 BLOOD CULTURE FOR BACTERIA: CPT | Performed by: PHYSICIAN ASSISTANT

## 2021-01-01 PROCEDURE — 84484 ASSAY OF TROPONIN QUANT: CPT | Performed by: EMERGENCY MEDICINE

## 2021-01-01 PROCEDURE — 84439 ASSAY OF FREE THYROXINE: CPT | Performed by: EMERGENCY MEDICINE

## 2021-01-01 PROCEDURE — 20605 DRAIN/INJ JOINT/BURSA W/O US: CPT | Performed by: NURSE PRACTITIONER

## 2021-01-01 PROCEDURE — 99291 CRITICAL CARE FIRST HOUR: CPT | Performed by: INTERNAL MEDICINE

## 2021-01-01 PROCEDURE — 25010000002 VANCOMYCIN 1 G RECONSTITUTED SOLUTION

## 2021-01-01 RX ORDER — HYDROCODONE BITARTRATE AND ACETAMINOPHEN 5; 325 MG/1; MG/1
1 TABLET ORAL EVERY 4 HOURS PRN
Status: DISCONTINUED | OUTPATIENT
Start: 2021-01-01 | End: 2021-01-01 | Stop reason: HOSPADM

## 2021-01-01 RX ORDER — CEFUROXIME AXETIL 500 MG/1
500 TABLET ORAL 2 TIMES DAILY
Qty: 14 TABLET | Refills: 0 | Status: SHIPPED | OUTPATIENT
Start: 2021-01-01 | End: 2021-01-01

## 2021-01-01 RX ORDER — POTASSIUM CHLORIDE 750 MG/1
TABLET, FILM COATED, EXTENDED RELEASE ORAL
Qty: 60 TABLET | Refills: 2 | Status: SHIPPED | OUTPATIENT
Start: 2021-01-01

## 2021-01-01 RX ORDER — SODIUM CHLORIDE 9 MG/ML
INJECTION, SOLUTION INTRAVENOUS
Status: DISPENSED
Start: 2021-01-01 | End: 2021-01-01

## 2021-01-01 RX ORDER — NITROGLYCERIN 0.4 MG/1
0.4 TABLET SUBLINGUAL
Status: DISCONTINUED | OUTPATIENT
Start: 2021-01-01 | End: 2021-01-01 | Stop reason: HOSPADM

## 2021-01-01 RX ORDER — ACETAMINOPHEN 325 MG/1
TABLET ORAL
Status: COMPLETED
Start: 2021-01-01 | End: 2021-01-01

## 2021-01-01 RX ORDER — TRIAMCINOLONE ACETONIDE 40 MG/ML
80 INJECTION, SUSPENSION INTRA-ARTICULAR; INTRAMUSCULAR
Status: COMPLETED | OUTPATIENT
Start: 2021-01-01 | End: 2021-01-01

## 2021-01-01 RX ORDER — CEFTRIAXONE 2 G/50ML
2 INJECTION, SOLUTION INTRAVENOUS ONCE
Status: COMPLETED | OUTPATIENT
Start: 2021-01-01 | End: 2021-01-01

## 2021-01-01 RX ORDER — SULFAMETHOXAZOLE AND TRIMETHOPRIM 800; 160 MG/1; MG/1
1 TABLET ORAL 2 TIMES DAILY
Qty: 14 TABLET | Refills: 0 | Status: SHIPPED | OUTPATIENT
Start: 2021-01-01 | End: 2021-01-01

## 2021-01-01 RX ORDER — FESOTERODINE FUMARATE 4 MG/1
4 TABLET, FILM COATED, EXTENDED RELEASE ORAL DAILY
COMMUNITY
Start: 2021-01-01 | End: 2021-01-01 | Stop reason: ALTCHOICE

## 2021-01-01 RX ORDER — MORPHINE SULFATE 15 MG/1
30 TABLET, FILM COATED, EXTENDED RELEASE ORAL EVERY 12 HOURS SCHEDULED
Status: DISCONTINUED | OUTPATIENT
Start: 2021-01-01 | End: 2021-01-01 | Stop reason: HOSPADM

## 2021-01-01 RX ORDER — CEPHALEXIN 500 MG/1
500 CAPSULE ORAL 2 TIMES DAILY
COMMUNITY
End: 2021-01-01 | Stop reason: HOSPADM

## 2021-01-01 RX ORDER — SODIUM CHLORIDE 0.9 % (FLUSH) 0.9 %
10 SYRINGE (ML) INJECTION AS NEEDED
Status: DISCONTINUED | OUTPATIENT
Start: 2021-01-01 | End: 2021-01-01 | Stop reason: HOSPADM

## 2021-01-01 RX ORDER — CEFEPIME HYDROCHLORIDE 2 G/50ML
2 INJECTION, SOLUTION INTRAVENOUS EVERY 24 HOURS
Status: DISCONTINUED | OUTPATIENT
Start: 2021-01-01 | End: 2021-01-01 | Stop reason: HOSPADM

## 2021-01-01 RX ORDER — HYDROCODONE BITARTRATE AND ACETAMINOPHEN 7.5; 325 MG/1; MG/1
1 TABLET ORAL EVERY 4 HOURS PRN
Status: DISCONTINUED | OUTPATIENT
Start: 2021-01-01 | End: 2021-01-01

## 2021-01-01 RX ORDER — MEMANTINE HYDROCHLORIDE 5 MG/1
10 TABLET ORAL DAILY
Status: DISCONTINUED | OUTPATIENT
Start: 2021-01-01 | End: 2021-01-01 | Stop reason: HOSPADM

## 2021-01-01 RX ORDER — CLONAZEPAM 0.5 MG/1
TABLET ORAL
Qty: 60 TABLET | Refills: 5 | Status: SHIPPED | OUTPATIENT
Start: 2021-01-01 | End: 2021-01-01 | Stop reason: HOSPADM

## 2021-01-01 RX ORDER — VANCOMYCIN HYDROCHLORIDE 1 G/20ML
INJECTION, POWDER, LYOPHILIZED, FOR SOLUTION INTRAVENOUS
Status: COMPLETED
Start: 2021-01-01 | End: 2021-01-01

## 2021-01-01 RX ORDER — FLUTICASONE PROPIONATE 50 MCG
2 SPRAY, SUSPENSION (ML) NASAL DAILY
Status: DISCONTINUED | OUTPATIENT
Start: 2021-01-01 | End: 2021-01-01 | Stop reason: HOSPADM

## 2021-01-01 RX ORDER — LIDOCAINE HYDROCHLORIDE 10 MG/ML
4 INJECTION, SOLUTION EPIDURAL; INFILTRATION; INTRACAUDAL; PERINEURAL
Status: COMPLETED | OUTPATIENT
Start: 2021-01-01 | End: 2021-01-01

## 2021-01-01 RX ORDER — CEFEPIME HYDROCHLORIDE 2 G/50ML
2 INJECTION, SOLUTION INTRAVENOUS ONCE
Status: COMPLETED | OUTPATIENT
Start: 2021-01-01 | End: 2021-01-01

## 2021-01-01 RX ORDER — SODIUM PHOSPHATE, DIBASIC AND SODIUM PHOSPHATE, MONOBASIC 7; 19 G/133ML; G/133ML
1 ENEMA RECTAL ONCE
Status: COMPLETED | OUTPATIENT
Start: 2021-01-01 | End: 2021-01-01

## 2021-01-01 RX ORDER — CEFEPIME HYDROCHLORIDE 2 G/50ML
2 INJECTION, SOLUTION INTRAVENOUS EVERY 12 HOURS SCHEDULED
Status: DISCONTINUED | OUTPATIENT
Start: 2021-01-01 | End: 2021-01-01

## 2021-01-01 RX ORDER — SODIUM CHLORIDE 0.9 % (FLUSH) 0.9 %
10 SYRINGE (ML) INJECTION EVERY 12 HOURS SCHEDULED
Status: DISCONTINUED | OUTPATIENT
Start: 2021-01-01 | End: 2021-01-01 | Stop reason: HOSPADM

## 2021-01-01 RX ORDER — GABAPENTIN 100 MG/1
200 CAPSULE ORAL EVERY 12 HOURS SCHEDULED
Status: DISCONTINUED | OUTPATIENT
Start: 2021-01-01 | End: 2021-01-01 | Stop reason: HOSPADM

## 2021-01-01 RX ORDER — POLYETHYLENE GLYCOL 3350 17 G/17G
17 POWDER, FOR SOLUTION ORAL DAILY
Status: DISCONTINUED | OUTPATIENT
Start: 2021-01-01 | End: 2021-01-01 | Stop reason: HOSPADM

## 2021-01-01 RX ORDER — MIRABEGRON 50 MG/1
TABLET, FILM COATED, EXTENDED RELEASE ORAL
COMMUNITY
Start: 2021-01-01

## 2021-01-01 RX ORDER — FLUOXETINE 20 MG/1
40 TABLET, FILM COATED ORAL DAILY
Qty: 60 TABLET | Refills: 6 | Status: SHIPPED | OUTPATIENT
Start: 2021-01-01

## 2021-01-01 RX ORDER — MEMANTINE HYDROCHLORIDE 10 MG/1
10 TABLET ORAL DAILY
Qty: 90 TABLET | Refills: 1 | Status: SHIPPED | OUTPATIENT
Start: 2021-01-01

## 2021-01-01 RX ORDER — SODIUM CHLORIDE, SODIUM LACTATE, POTASSIUM CHLORIDE, CALCIUM CHLORIDE 600; 310; 30; 20 MG/100ML; MG/100ML; MG/100ML; MG/100ML
100 INJECTION, SOLUTION INTRAVENOUS CONTINUOUS
Status: DISCONTINUED | OUTPATIENT
Start: 2021-01-01 | End: 2021-01-01 | Stop reason: HOSPADM

## 2021-01-01 RX ORDER — HYDROCODONE BITARTRATE AND ACETAMINOPHEN 7.5; 325 MG/1; MG/1
1 TABLET ORAL EVERY 4 HOURS PRN
Status: DISCONTINUED | OUTPATIENT
Start: 2021-01-01 | End: 2021-01-01 | Stop reason: HOSPADM

## 2021-01-01 RX ORDER — FAMOTIDINE 10 MG/ML
20 INJECTION, SOLUTION INTRAVENOUS EVERY 12 HOURS SCHEDULED
Status: DISCONTINUED | OUTPATIENT
Start: 2021-01-01 | End: 2021-01-01 | Stop reason: HOSPADM

## 2021-01-01 RX ORDER — CEFTRIAXONE 2 G/50ML
2 INJECTION, SOLUTION INTRAVENOUS EVERY 24 HOURS
Status: DISCONTINUED | OUTPATIENT
Start: 2021-01-01 | End: 2021-01-01

## 2021-01-01 RX ORDER — MEMANTINE HYDROCHLORIDE 10 MG/1
10 TABLET ORAL DAILY
Qty: 90 TABLET | Refills: 0
Start: 2021-01-01 | End: 2021-01-01

## 2021-01-01 RX ORDER — FLUOXETINE HYDROCHLORIDE 20 MG/1
20 CAPSULE ORAL DAILY
Status: DISCONTINUED | OUTPATIENT
Start: 2021-01-01 | End: 2021-01-01 | Stop reason: HOSPADM

## 2021-01-01 RX ORDER — BUPIVACAINE HYDROCHLORIDE 2.5 MG/ML
10 INJECTION, SOLUTION EPIDURAL; INFILTRATION; INTRACAUDAL ONCE
Status: COMPLETED | OUTPATIENT
Start: 2021-01-01 | End: 2021-01-01

## 2021-01-01 RX ORDER — CEFDINIR 300 MG/1
300 CAPSULE ORAL 2 TIMES DAILY
Qty: 10 CAPSULE | Refills: 0 | Status: SHIPPED | OUTPATIENT
Start: 2021-01-01 | End: 2021-01-01

## 2021-01-01 RX ORDER — NALOXONE HYDROCHLORIDE 0.4 MG/ML
INJECTION, SOLUTION INTRAMUSCULAR; INTRAVENOUS; SUBCUTANEOUS
Status: COMPLETED
Start: 2021-01-01 | End: 2021-01-01

## 2021-01-01 RX ORDER — ACETAMINOPHEN 325 MG/1
650 TABLET ORAL EVERY 4 HOURS PRN
Status: DISCONTINUED | OUTPATIENT
Start: 2021-01-01 | End: 2021-01-01 | Stop reason: HOSPADM

## 2021-01-01 RX ORDER — GABAPENTIN 300 MG/1
300 CAPSULE ORAL EVERY 8 HOURS SCHEDULED
Status: DISCONTINUED | OUTPATIENT
Start: 2021-01-01 | End: 2021-01-01 | Stop reason: HOSPADM

## 2021-01-01 RX ORDER — BETAMETHASONE SODIUM PHOSPHATE AND BETAMETHASONE ACETATE 3; 3 MG/ML; MG/ML
12 INJECTION, SUSPENSION INTRA-ARTICULAR; INTRALESIONAL; INTRAMUSCULAR; SOFT TISSUE
Status: COMPLETED | OUTPATIENT
Start: 2021-01-01 | End: 2021-01-01

## 2021-01-01 RX ORDER — ACETAMINOPHEN 650 MG/1
650 SUPPOSITORY RECTAL EVERY 4 HOURS PRN
Status: DISCONTINUED | OUTPATIENT
Start: 2021-01-01 | End: 2021-01-01 | Stop reason: HOSPADM

## 2021-01-01 RX ORDER — CEFEPIME HYDROCHLORIDE 2 G/50ML
2 INJECTION, SOLUTION INTRAVENOUS EVERY 12 HOURS
Status: DISCONTINUED | OUTPATIENT
Start: 2021-01-01 | End: 2021-01-01 | Stop reason: HOSPADM

## 2021-01-01 RX ORDER — LIDOCAINE HYDROCHLORIDE 10 MG/ML
2 INJECTION, SOLUTION EPIDURAL; INFILTRATION; INTRACAUDAL; PERINEURAL
Status: COMPLETED | OUTPATIENT
Start: 2021-01-01 | End: 2021-01-01

## 2021-01-01 RX ORDER — METHYLPREDNISOLONE ACETATE 40 MG/ML
40 INJECTION, SUSPENSION INTRA-ARTICULAR; INTRALESIONAL; INTRAMUSCULAR; SOFT TISSUE ONCE
Status: COMPLETED | OUTPATIENT
Start: 2021-01-01 | End: 2021-01-01

## 2021-01-01 RX ORDER — VANCOMYCIN HYDROCHLORIDE 500 MG/10ML
INJECTION, POWDER, LYOPHILIZED, FOR SOLUTION INTRAVENOUS
Status: COMPLETED
Start: 2021-01-01 | End: 2021-01-01

## 2021-01-01 RX ORDER — FLUOXETINE 20 MG/1
TABLET, FILM COATED ORAL
Qty: 60 TABLET | Refills: 4 | Status: SHIPPED | OUTPATIENT
Start: 2021-01-01 | End: 2021-01-01 | Stop reason: SDUPTHER

## 2021-01-01 RX ORDER — LIDOCAINE HYDROCHLORIDE 10 MG/ML
10 INJECTION, SOLUTION INFILTRATION; PERINEURAL ONCE
Status: COMPLETED | OUTPATIENT
Start: 2021-01-01 | End: 2021-01-01

## 2021-01-01 RX ORDER — FUROSEMIDE 20 MG/1
TABLET ORAL
Qty: 60 TABLET | Refills: 2 | Status: SHIPPED | OUTPATIENT
Start: 2021-01-01

## 2021-01-01 RX ORDER — LIDOCAINE HYDROCHLORIDE 10 MG/ML
8 INJECTION, SOLUTION EPIDURAL; INFILTRATION; INTRACAUDAL; PERINEURAL
Status: COMPLETED | OUTPATIENT
Start: 2021-01-01 | End: 2021-01-01

## 2021-01-01 RX ORDER — LIDOCAINE HYDROCHLORIDE AND EPINEPHRINE 10; 10 MG/ML; UG/ML
10 INJECTION, SOLUTION INFILTRATION; PERINEURAL ONCE
Status: COMPLETED | OUTPATIENT
Start: 2021-01-01 | End: 2021-01-01

## 2021-01-01 RX ORDER — ONDANSETRON 4 MG/1
4 TABLET, FILM COATED ORAL EVERY 6 HOURS PRN
Status: DISCONTINUED | OUTPATIENT
Start: 2021-01-01 | End: 2021-01-01 | Stop reason: HOSPADM

## 2021-01-01 RX ORDER — SODIUM CHLORIDE 9 MG/ML
40 INJECTION, SOLUTION INTRAVENOUS AS NEEDED
Status: DISCONTINUED | OUTPATIENT
Start: 2021-01-01 | End: 2021-01-01 | Stop reason: HOSPADM

## 2021-01-01 RX ORDER — MORPHINE SULFATE 30 MG/1
60 TABLET ORAL 3 TIMES DAILY
Status: SHIPPED | COMMUNITY
Start: 2021-01-01

## 2021-01-01 RX ORDER — CLONAZEPAM 0.5 MG/1
0.5 TABLET ORAL NIGHTLY PRN
Status: DISCONTINUED | OUTPATIENT
Start: 2021-01-01 | End: 2021-01-01 | Stop reason: HOSPADM

## 2021-01-01 RX ORDER — HYDROCODONE BITARTRATE AND ACETAMINOPHEN 5; 325 MG/1; MG/1
1 TABLET ORAL EVERY 6 HOURS PRN
Status: DISCONTINUED | OUTPATIENT
Start: 2021-01-01 | End: 2021-01-01 | Stop reason: HOSPADM

## 2021-01-01 RX ORDER — ONDANSETRON 2 MG/ML
4 INJECTION INTRAMUSCULAR; INTRAVENOUS EVERY 6 HOURS PRN
Status: DISCONTINUED | OUTPATIENT
Start: 2021-01-01 | End: 2021-01-01 | Stop reason: HOSPADM

## 2021-01-01 RX ORDER — TRIAMCINOLONE ACETONIDE 40 MG/ML
40 INJECTION, SUSPENSION INTRA-ARTICULAR; INTRAMUSCULAR
Status: COMPLETED | OUTPATIENT
Start: 2021-01-01 | End: 2021-01-01

## 2021-01-01 RX ORDER — NALOXONE HCL 0.4 MG/ML
0.4 VIAL (ML) INJECTION ONCE
Status: COMPLETED | OUTPATIENT
Start: 2021-01-01 | End: 2021-01-01

## 2021-01-01 RX ORDER — BISACODYL 10 MG
10 SUPPOSITORY, RECTAL RECTAL DAILY PRN
Status: DISCONTINUED | OUTPATIENT
Start: 2021-01-01 | End: 2021-01-01 | Stop reason: HOSPADM

## 2021-01-01 RX ORDER — ALBUTEROL SULFATE 2.5 MG/3ML
2.5 SOLUTION RESPIRATORY (INHALATION) EVERY 6 HOURS PRN
Status: DISCONTINUED | OUTPATIENT
Start: 2021-01-01 | End: 2021-01-01 | Stop reason: HOSPADM

## 2021-01-01 RX ORDER — POLYETHYLENE GLYCOL 3350 17 G/17G
17 POWDER, FOR SOLUTION ORAL DAILY PRN
Qty: 14 EACH | Refills: 0 | Status: SHIPPED | OUTPATIENT
Start: 2021-01-01

## 2021-01-01 RX ORDER — MEMANTINE HYDROCHLORIDE 10 MG/1
TABLET ORAL
Qty: 90 TABLET | Refills: 1 | Status: SHIPPED | OUTPATIENT
Start: 2021-01-01 | End: 2021-01-01 | Stop reason: SDUPTHER

## 2021-01-01 RX ORDER — HYDROCODONE BITARTRATE AND ACETAMINOPHEN 5; 325 MG/1; MG/1
1 TABLET ORAL EVERY 4 HOURS PRN
Status: DISCONTINUED | OUTPATIENT
Start: 2021-01-01 | End: 2021-01-01

## 2021-01-01 RX ADMIN — SODIUM CHLORIDE, POTASSIUM CHLORIDE, SODIUM LACTATE AND CALCIUM CHLORIDE 150 ML/HR: 600; 310; 30; 20 INJECTION, SOLUTION INTRAVENOUS at 19:27

## 2021-01-01 RX ADMIN — GABAPENTIN 200 MG: 100 CAPSULE ORAL at 09:26

## 2021-01-01 RX ADMIN — FLUTICASONE PROPIONATE 2 SPRAY: 50 SPRAY, METERED NASAL at 11:37

## 2021-01-01 RX ADMIN — POLYETHYLENE GLYCOL 3350 17 G: 17 POWDER, FOR SOLUTION ORAL at 09:25

## 2021-01-01 RX ADMIN — VANCOMYCIN HYDROCHLORIDE 1500 MG: 1 INJECTION, POWDER, LYOPHILIZED, FOR SOLUTION INTRAVENOUS at 16:22

## 2021-01-01 RX ADMIN — BETAMETHASONE SODIUM PHOSPHATE AND BETAMETHASONE ACETATE 12 MG: 3; 3 INJECTION, SUSPENSION INTRA-ARTICULAR; INTRALESIONAL; INTRAMUSCULAR; SOFT TISSUE at 16:19

## 2021-01-01 RX ADMIN — LIDOCAINE HYDROCHLORIDE AND EPINEPHRINE 10 ML: 10; 10 INJECTION, SOLUTION INFILTRATION; PERINEURAL at 17:03

## 2021-01-01 RX ADMIN — SODIUM CHLORIDE, PRESERVATIVE FREE 10 ML: 5 INJECTION INTRAVENOUS at 21:12

## 2021-01-01 RX ADMIN — POLYETHYLENE GLYCOL 3350 17 G: 17 POWDER, FOR SOLUTION ORAL at 09:06

## 2021-01-01 RX ADMIN — SODIUM CHLORIDE, POTASSIUM CHLORIDE, SODIUM LACTATE AND CALCIUM CHLORIDE 100 ML/HR: 600; 310; 30; 20 INJECTION, SOLUTION INTRAVENOUS at 01:27

## 2021-01-01 RX ADMIN — FLUOXETINE 20 MG: 20 CAPSULE ORAL at 11:16

## 2021-01-01 RX ADMIN — VANCOMYCIN HYDROCHLORIDE: 500 INJECTION, POWDER, LYOPHILIZED, FOR SOLUTION INTRAVENOUS at 21:30

## 2021-01-01 RX ADMIN — BETAMETHASONE SODIUM PHOSPHATE AND BETAMETHASONE ACETATE 12 MG: 3; 3 INJECTION, SUSPENSION INTRA-ARTICULAR; INTRALESIONAL; INTRAMUSCULAR; SOFT TISSUE at 16:18

## 2021-01-01 RX ADMIN — FLUOXETINE 20 MG: 20 CAPSULE ORAL at 09:26

## 2021-01-01 RX ADMIN — SODIUM CHLORIDE 1000 ML: 9 INJECTION, SOLUTION INTRAVENOUS at 14:35

## 2021-01-01 RX ADMIN — FLUTICASONE PROPIONATE 2 SPRAY: 50 SPRAY, METERED NASAL at 09:06

## 2021-01-01 RX ADMIN — IOPAMIDOL 100 ML: 755 INJECTION, SOLUTION INTRAVENOUS at 15:29

## 2021-01-01 RX ADMIN — SODIUM PHOSPHATE, DIBASIC AND SODIUM PHOSPHATE, MONOBASIC 1 ENEMA: 7; 19 ENEMA RECTAL at 11:51

## 2021-01-01 RX ADMIN — ENOXAPARIN SODIUM 40 MG: 40 INJECTION SUBCUTANEOUS at 20:15

## 2021-01-01 RX ADMIN — GABAPENTIN 300 MG: 300 CAPSULE ORAL at 22:00

## 2021-01-01 RX ADMIN — MORPHINE SULFATE 30 MG: 15 TABLET, FILM COATED, EXTENDED RELEASE ORAL at 22:00

## 2021-01-01 RX ADMIN — CEFEPIME HYDROCHLORIDE 2 G: 2 INJECTION, SOLUTION INTRAVENOUS at 21:01

## 2021-01-01 RX ADMIN — ACETAMINOPHEN 650 MG: 325 TABLET, FILM COATED ORAL at 12:26

## 2021-01-01 RX ADMIN — SODIUM CHLORIDE, POTASSIUM CHLORIDE, SODIUM LACTATE AND CALCIUM CHLORIDE 1000 ML: 600; 310; 30; 20 INJECTION, SOLUTION INTRAVENOUS at 20:09

## 2021-01-01 RX ADMIN — FAMOTIDINE 20 MG: 10 INJECTION, SOLUTION INTRAVENOUS at 20:01

## 2021-01-01 RX ADMIN — SODIUM CHLORIDE, PRESERVATIVE FREE 10 ML: 5 INJECTION INTRAVENOUS at 11:37

## 2021-01-01 RX ADMIN — LIDOCAINE HYDROCHLORIDE 2 ML: 10 INJECTION, SOLUTION EPIDURAL; INFILTRATION; INTRACAUDAL; PERINEURAL at 15:43

## 2021-01-01 RX ADMIN — FAMOTIDINE 20 MG: 10 INJECTION, SOLUTION INTRAVENOUS at 09:26

## 2021-01-01 RX ADMIN — SODIUM CHLORIDE, POTASSIUM CHLORIDE, SODIUM LACTATE AND CALCIUM CHLORIDE 150 ML/HR: 600; 310; 30; 20 INJECTION, SOLUTION INTRAVENOUS at 04:28

## 2021-01-01 RX ADMIN — SODIUM CHLORIDE 1000 ML: 9 INJECTION, SOLUTION INTRAVENOUS at 16:23

## 2021-01-01 RX ADMIN — SODIUM CHLORIDE, PRESERVATIVE FREE 10 ML: 5 INJECTION INTRAVENOUS at 22:52

## 2021-01-01 RX ADMIN — TRIAMCINOLONE ACETONIDE 80 MG: 40 INJECTION, SUSPENSION INTRA-ARTICULAR; INTRAMUSCULAR at 15:45

## 2021-01-01 RX ADMIN — GABAPENTIN 300 MG: 300 CAPSULE ORAL at 06:46

## 2021-01-01 RX ADMIN — IOPAMIDOL 2 ML: 612 INJECTION, SOLUTION INTRAVENOUS at 14:42

## 2021-01-01 RX ADMIN — HYDROCODONE BITARTRATE AND ACETAMINOPHEN 1 TABLET: 5; 325 TABLET ORAL at 11:34

## 2021-01-01 RX ADMIN — SODIUM CHLORIDE, PRESERVATIVE FREE 10 ML: 5 INJECTION INTRAVENOUS at 20:17

## 2021-01-01 RX ADMIN — FLUTICASONE PROPIONATE 2 SPRAY: 50 SPRAY, METERED NASAL at 09:27

## 2021-01-01 RX ADMIN — SODIUM CHLORIDE, PRESERVATIVE FREE 10 ML: 5 INJECTION INTRAVENOUS at 08:10

## 2021-01-01 RX ADMIN — MEMANTINE HYDROCHLORIDE 10 MG: 5 TABLET ORAL at 18:58

## 2021-01-01 RX ADMIN — MEMANTINE HYDROCHLORIDE 10 MG: 5 TABLET ORAL at 11:16

## 2021-01-01 RX ADMIN — BUPIVACAINE HYDROCHLORIDE 5 ML: 2.5 INJECTION, SOLUTION EPIDURAL; INFILTRATION; INTRACAUDAL; PERINEURAL at 14:42

## 2021-01-01 RX ADMIN — TRIAMCINOLONE ACETONIDE 80 MG: 40 INJECTION, SUSPENSION INTRA-ARTICULAR; INTRAMUSCULAR at 14:12

## 2021-01-01 RX ADMIN — CEFEPIME HYDROCHLORIDE 2 G: 2 INJECTION, SOLUTION INTRAVENOUS at 04:25

## 2021-01-01 RX ADMIN — LIDOCAINE HYDROCHLORIDE 3 ML: 10 INJECTION, SOLUTION INFILTRATION; PERINEURAL at 14:42

## 2021-01-01 RX ADMIN — CEFEPIME HYDROCHLORIDE 2 G: 2 INJECTION, SOLUTION INTRAVENOUS at 22:00

## 2021-01-01 RX ADMIN — ENOXAPARIN SODIUM 30 MG: 30 INJECTION SUBCUTANEOUS at 20:59

## 2021-01-01 RX ADMIN — LIDOCAINE HYDROCHLORIDE 4 ML: 10 INJECTION, SOLUTION EPIDURAL; INFILTRATION; INTRACAUDAL; PERINEURAL at 15:31

## 2021-01-01 RX ADMIN — SODIUM CHLORIDE, POTASSIUM CHLORIDE, SODIUM LACTATE AND CALCIUM CHLORIDE 100 ML/HR: 600; 310; 30; 20 INJECTION, SOLUTION INTRAVENOUS at 11:58

## 2021-01-01 RX ADMIN — ENOXAPARIN SODIUM 40 MG: 40 INJECTION SUBCUTANEOUS at 18:10

## 2021-01-01 RX ADMIN — FLUOXETINE 20 MG: 20 CAPSULE ORAL at 11:37

## 2021-01-01 RX ADMIN — POLYETHYLENE GLYCOL 3350 17 G: 17 POWDER, FOR SOLUTION ORAL at 11:17

## 2021-01-01 RX ADMIN — METHYLPREDNISOLONE ACETATE 80 MG: 40 INJECTION, SUSPENSION INTRA-ARTICULAR; INTRALESIONAL; INTRAMUSCULAR; SOFT TISSUE at 14:42

## 2021-01-01 RX ADMIN — TRIAMCINOLONE ACETONIDE 80 MG: 40 INJECTION, SUSPENSION INTRA-ARTICULAR; INTRAMUSCULAR at 15:31

## 2021-01-01 RX ADMIN — CEFEPIME HYDROCHLORIDE 2 G: 2 INJECTION, SOLUTION INTRAVENOUS at 11:22

## 2021-01-01 RX ADMIN — MEMANTINE HYDROCHLORIDE 10 MG: 5 TABLET ORAL at 09:01

## 2021-01-01 RX ADMIN — FLUOXETINE 20 MG: 20 CAPSULE ORAL at 09:00

## 2021-01-01 RX ADMIN — CEFEPIME HYDROCHLORIDE 2 G: 2 INJECTION, SOLUTION INTRAVENOUS at 04:27

## 2021-01-01 RX ADMIN — GABAPENTIN 300 MG: 300 CAPSULE ORAL at 13:23

## 2021-01-01 RX ADMIN — MORPHINE SULFATE 30 MG: 15 TABLET, FILM COATED, EXTENDED RELEASE ORAL at 11:36

## 2021-01-01 RX ADMIN — CEFEPIME HYDROCHLORIDE 2 G: 2 INJECTION, SOLUTION INTRAVENOUS at 15:47

## 2021-01-01 RX ADMIN — MEMANTINE HYDROCHLORIDE 10 MG: 5 TABLET ORAL at 09:26

## 2021-01-01 RX ADMIN — FLUTICASONE PROPIONATE 2 SPRAY: 50 SPRAY, METERED NASAL at 11:15

## 2021-01-01 RX ADMIN — CEFTRIAXONE 2 G: 2 INJECTION, SOLUTION INTRAVENOUS at 16:31

## 2021-01-01 RX ADMIN — LIDOCAINE HYDROCHLORIDE 8 ML: 10 INJECTION, SOLUTION EPIDURAL; INFILTRATION; INTRACAUDAL; PERINEURAL at 14:12

## 2021-01-01 RX ADMIN — LIDOCAINE HYDROCHLORIDE 4 ML: 10 INJECTION, SOLUTION EPIDURAL; INFILTRATION; INTRACAUDAL; PERINEURAL at 14:12

## 2021-01-01 RX ADMIN — FAMOTIDINE 20 MG: 10 INJECTION, SOLUTION INTRAVENOUS at 08:07

## 2021-01-01 RX ADMIN — SODIUM CHLORIDE, PRESERVATIVE FREE 10 ML: 5 INJECTION INTRAVENOUS at 20:59

## 2021-01-01 RX ADMIN — MORPHINE SULFATE 30 MG: 15 TABLET, FILM COATED, EXTENDED RELEASE ORAL at 09:01

## 2021-01-01 RX ADMIN — HYDROCODONE BITARTRATE AND ACETAMINOPHEN 1 TABLET: 5; 325 TABLET ORAL at 03:45

## 2021-01-01 RX ADMIN — FAMOTIDINE 20 MG: 10 INJECTION, SOLUTION INTRAVENOUS at 20:16

## 2021-01-01 RX ADMIN — LIDOCAINE HYDROCHLORIDE 4 ML: 10 INJECTION, SOLUTION EPIDURAL; INFILTRATION; INTRACAUDAL; PERINEURAL at 10:54

## 2021-01-01 RX ADMIN — SODIUM CHLORIDE, PRESERVATIVE FREE 10 ML: 5 INJECTION INTRAVENOUS at 20:01

## 2021-01-01 RX ADMIN — CEFEPIME HYDROCHLORIDE 2 G: 2 INJECTION, SOLUTION INTRAVENOUS at 11:34

## 2021-01-01 RX ADMIN — ACETAMINOPHEN 650 MG: 325 TABLET, FILM COATED ORAL at 20:01

## 2021-01-01 RX ADMIN — VANCOMYCIN HYDROCHLORIDE 1500 MG: 500 INJECTION, POWDER, LYOPHILIZED, FOR SOLUTION INTRAVENOUS at 21:30

## 2021-01-01 RX ADMIN — GABAPENTIN 200 MG: 100 CAPSULE ORAL at 20:01

## 2021-01-01 RX ADMIN — HYDROCODONE BITARTRATE AND ACETAMINOPHEN 1 TABLET: 5; 325 TABLET ORAL at 15:04

## 2021-01-01 RX ADMIN — HYDROCODONE BITARTRATE AND ACETAMINOPHEN 1 TABLET: 7.5; 325 TABLET ORAL at 18:58

## 2021-01-01 RX ADMIN — SODIUM CHLORIDE 1000 ML: 9 INJECTION, SOLUTION INTRAVENOUS at 14:48

## 2021-01-01 RX ADMIN — LIDOCAINE HYDROCHLORIDE 4 ML: 10 INJECTION, SOLUTION EPIDURAL; INFILTRATION; INTRACAUDAL; PERINEURAL at 16:19

## 2021-01-01 RX ADMIN — TRIAMCINOLONE ACETONIDE 40 MG: 40 INJECTION, SUSPENSION INTRA-ARTICULAR; INTRAMUSCULAR at 15:43

## 2021-01-01 RX ADMIN — SODIUM CHLORIDE, PRESERVATIVE FREE 10 ML: 5 INJECTION INTRAVENOUS at 11:52

## 2021-01-01 RX ADMIN — ACETAMINOPHEN 650 MG: 325 TABLET, FILM COATED ORAL at 06:35

## 2021-01-01 RX ADMIN — SODIUM CHLORIDE 1000 MG: 900 INJECTION, SOLUTION INTRAVENOUS at 22:50

## 2021-01-01 RX ADMIN — LIDOCAINE HYDROCHLORIDE 4 ML: 10 INJECTION, SOLUTION EPIDURAL; INFILTRATION; INTRACAUDAL; PERINEURAL at 15:45

## 2021-01-01 RX ADMIN — NALOXONE HYDROCHLORIDE 0.4 MG: 0.4 INJECTION, SOLUTION INTRAMUSCULAR; INTRAVENOUS; SUBCUTANEOUS at 14:45

## 2021-01-01 RX ADMIN — SODIUM CHLORIDE, PRESERVATIVE FREE 10 ML: 5 INJECTION INTRAVENOUS at 09:26

## 2021-01-01 RX ADMIN — TRIAMCINOLONE ACETONIDE 80 MG: 40 INJECTION, SUSPENSION INTRA-ARTICULAR; INTRAMUSCULAR at 10:54

## 2021-01-01 RX ADMIN — LIDOCAINE HYDROCHLORIDE 4 ML: 10 INJECTION, SOLUTION EPIDURAL; INFILTRATION; INTRACAUDAL; PERINEURAL at 16:18

## 2021-01-01 RX ADMIN — HYDROCODONE BITARTRATE AND ACETAMINOPHEN 1 TABLET: 7.5; 325 TABLET ORAL at 01:26

## 2021-01-01 RX ADMIN — Medication 0.4 MG: at 14:45

## 2021-01-01 RX ADMIN — CEFEPIME HYDROCHLORIDE 2 G: 2 INJECTION, SOLUTION INTRAVENOUS at 23:39

## 2021-01-01 RX ADMIN — SODIUM CHLORIDE 40 ML: 9 INJECTION, SOLUTION INTRAVENOUS at 21:03

## 2021-01-07 NOTE — PROGRESS NOTES
Subjective     Sandra Mane is a 75 y.o. female, who presents with a chief complaint of   Chief Complaint   Patient presents with   • Hyperlipidemia   • Anemia   • Chronic Kidney Disease   • Anxiety   • Depression     Hyperlipidemia    Chronic Kidney Disease  Associated symptoms include arthralgias.   Memory Loss  Associated symptoms include arthralgias.   Leg Swelling  Associated symptoms include arthralgias.   Anemia    DepressionPatient is not experiencing: nervousness/anxiety.    Knee Pain     Anxiety  Patient reports no nervous/anxious behavior.     Her past medical history is significant for anemia and depression.     1. Dementia.  Pt takes memantine and tolerates this.    2. Depression with anxiety.  Pt states she is doing better with the increased dose fluoxetine.  Denies SI.    3. Chronic back pain.  She sees Dr. Anaya, who does epidural injections.  She also takes morphine.    4. Left knee OA.  She has injections by DINORAH Hauser and is currently feeling good.    5. Leg swelling.  This is chronic.  She takes furosemide.  Declines compression stockings.    6. Weight loss. 28 pound weight loss since July.  She attributes this to the Covid-19 pandemic and not going to restaurants every day.    7. Dysuria X 2 months.  Mild, intermittent.  Cloudy urine.  Frequency.  No fevers or flank pain.    The following portions of the patient's history were reviewed and updated as appropriate: allergies, current medications, past family history, past medical history, past social history, past surgical history and problem list.    Allergies: Patient has no known allergies.    Review of Systems   Constitutional: Positive for unexpected weight change.   HENT: Negative.    Eyes: Negative.    Cardiovascular: Positive for leg swelling.   Gastrointestinal: Negative.    Endocrine: Negative.    Genitourinary: Positive for dysuria and frequency.   Musculoskeletal: Positive for arthralgias and back pain.   Allergic/Immunologic:  Negative.    Neurological: Negative.    Hematological: Negative.    Psychiatric/Behavioral: The patient is not nervous/anxious.      Objective     Wt Readings from Last 3 Encounters:   01/07/21 76.2 kg (168 lb)   10/29/20 80.3 kg (177 lb)   09/03/20 80.3 kg (177 lb)     Temp Readings from Last 3 Encounters:   01/07/21 97.3 °F (36.3 °C) (Temporal)   09/03/20 97.5 °F (36.4 °C) (Temporal)   07/21/20 97.3 °F (36.3 °C) (Temporal)     BP Readings from Last 3 Encounters:   01/07/21 122/68   09/03/20 110/70   07/21/20 (!) 170/102     Pulse Readings from Last 3 Encounters:   01/07/21 62   09/03/20 70   07/21/20 53     Body mass index is 39.05 kg/m².  SpO2 Readings from Last 3 Encounters:   02/26/18 96%   12/04/17 97%   10/10/17 95%       Physical Exam   Constitutional: She is oriented to person, place, and time. She appears well-developed.   Sitting in wheelchair.   HENT:   Head: Normocephalic and atraumatic.   Mouth/Throat: Mucous membranes are moist.   Eyes: Conjunctivae are normal.   Neck: Neck supple. No neck rigidity.   Cardiovascular: Normal rate, regular rhythm and normal heart sounds. Exam reveals no gallop and no friction rub.   No murmur heard.  Pulmonary/Chest: Effort normal and breath sounds normal. No respiratory distress. She has no wheezes. She has no rales.   Abdominal: Soft. Bowel sounds are normal.   Musculoskeletal: No deformity.      Right lower leg: Edema present.      Left lower leg: Edema (left greater than right) present.      Comments: Back not examined.   Neurological: She is alert and oriented to person, place, and time.   Skin: Skin is warm and dry. No rash noted.   Psychiatric: Her behavior is normal. Mood normal.   Nursing note and vitals reviewed.      Assessment/Plan   Diagnoses and all orders for this visit:    1. Hyperlipidemia, unspecified hyperlipidemia type (Primary)    2. Stage 3 chronic kidney disease, unspecified whether stage 3a or 3b CKD    3. Depression with anxiety    4. Chronic  midline low back pain, unspecified whether sciatica present    5. Anemia, unspecified type    6. Late onset Alzheimer's disease without behavioral disturbance (CMS/HCC)    7. Gastroesophageal reflux disease without esophagitis    8. Dependent edema    9. Arthritis    10. Routine health maintenance    11. Encounter for screening for malignant neoplasm of colon  -     Cologuard - Stool, Per Rectum; Future    12. Encounter for screening mammogram for malignant neoplasm of breast  -     Mammo Screening Bilateral With CAD; Future    13. Acute cystitis without hematuria  -     sulfamethoxazole-trimethoprim (Bactrim DS) 800-160 MG per tablet; Take 1 tablet by mouth 2 (Two) Times a Day for 7 days.  Dispense: 14 tablet; Refill: 0    14. Weight loss    Other orders  -     memantine (NAMENDA) 10 MG tablet; Take 1 tablet by mouth Daily.  Dispense: 90 tablet; Refill: 0      1. Hyperlipidemia.  Mild.  Controlled with lifestyle measures. Recent labs reviewed.    2. CKDIII.  Stable.  Avoid NSAIDs.    3. Depression with anxiety.  Improved.  Continue fluoxetine 40 mg daily.  Anticipatory guidance given.    4. Chronic back pain.  Stable.  Per pain management.  Dr. Anaya.    5. Anemia.  Chronic.  Stable.  Ferritin, B12, folate normal in the past.     6. Alzheimer's.  She has had neuropsych testing and neurology consult.  She saw Dr. Cole.  Continue memantine.    8. GERD. Controlled with omeprazole prn.    9. Dependent edema.  Continue furosemide 40 mg daily and potassium supplement 20 mEq daily.  Compression stockings declined.      10. Left knee OA.  Injections per ortho.    11. Routine health maint.  She agrees to send in Cologuard.  Mammogram ordered.  I instructed the patient on making sure her cancer screenings are up to date given recent weight loss.  Flu and Pneumovax UTD.    12. Acute cystitis.  Treat with Bactrim DS.  Sensitivities pending.    13. Weight loss.  Likely d/t change in lifestyle.  I reviewed patient's current  diet.  Advised to increase protein and iron intake; eating toast and cereal in the mornings and salad in the evenings; she is getting in fruits and vegetables.  F/U 3-4 months.      Outpatient Medications Prior to Visit   Medication Sig Dispense Refill   • clonazePAM (KlonoPIN) 0.5 MG tablet TAKE TWO TABLETS BY MOUTH ONCE NIGHTLY AS NEEDED 60 tablet 0   • FLUoxetine (PROzac) 20 MG tablet Take 2 tablets by mouth Daily. 60 tablet 5   • fluticasone (FLONASE) 50 MCG/ACT nasal spray 2 sprays into the nostril(s) as directed by provider Daily.     • furosemide (LASIX) 20 MG tablet TAKE TWO TABLETS BY MOUTH DAILY 60 tablet 3   • gabapentin (NEURONTIN) 400 MG capsule Take 400 mg by mouth every 4 (four) hours.     • Morphine (MSIR) 30 MG tablet 2 tablets 2 (two) times a day.     • omeprazole (priLOSEC) 20 MG capsule Take 20 mg by mouth.     • potassium chloride 10 MEQ CR tablet TAKE TWO TABLETS BY MOUTH DAILY 60 tablet 3   • Morphine (MSIR) 30 MG tablet      • memantine (NAMENDA) 10 MG tablet TAKE ONE TABLET BY MOUTH DAILY (Patient taking differently: 60 mg 2 (Two) Times a Day.) 90 tablet 0   • Morphine (MS CONTIN) 100 MG 12 hr tablet        No facility-administered medications prior to visit.      New Medications Ordered This Visit   Medications   • memantine (NAMENDA) 10 MG tablet     Sig: Take 1 tablet by mouth Daily.     Dispense:  90 tablet     Refill:  0   • sulfamethoxazole-trimethoprim (Bactrim DS) 800-160 MG per tablet     Sig: Take 1 tablet by mouth 2 (Two) Times a Day for 7 days.     Dispense:  14 tablet     Refill:  0     [unfilled]  Medications Discontinued During This Encounter   Medication Reason   • Morphine (MS CONTIN) 100 MG 12 hr tablet *Therapy completed   • Morphine (MSIR) 30 MG tablet    • memantine (NAMENDA) 10 MG tablet Reorder       Return in about 4 months (around 5/7/2021).

## 2021-01-29 NOTE — PROGRESS NOTES
Subjective:     Patient ID: Sandra Mane is a 75 y.o. female.    Chief Complaint:  Follow-up DJD left shoulder, rotator cuff tear  Follow-up DJD left knee   Corticosteroid injections last received 10/29/2020   History of Present Illness  Sandra Mane returns to clinic with spouse for evaluation of her left knee and left shoulder.  Left knee pain.  Well-controlled continues to experience pain at the left shoulder.  Received significant symptom relief with previous corticosteroid injections the left and as well as the glenohumeral joint of the left shoulder, maximal tenderness present today's visit the lateral aspect of the shoulder.  Increased.  With reaching above head, reaching out to side.  She has noted weight loss spouse states primary care is evaluating.  Denies other concerns present time.     Social History     Occupational History   • Not on file   Tobacco Use   • Smoking status: Never Smoker   • Smokeless tobacco: Never Used   Substance and Sexual Activity   • Alcohol use: No   • Drug use: No   • Sexual activity: Defer      Past Medical History:   Diagnosis Date   • Anxiety    • Arthritis    • Back pain     SEES DR. BERRY FOR THIS   • Depression      Past Surgical History:   Procedure Laterality Date   • COLONOSCOPY     • JOINT REPLACEMENT     • REPLACEMENT TOTAL KNEE Right    • SPINAL CORD STIMULATOR IMPLANT  2011   • SPINAL CORD STIMULATOR IMPLANT         Family History   Problem Relation Age of Onset   • Cancer Father    • COPD Father    • Breast cancer Paternal Aunt          Review of Systems   Constitutional: Negative for chills, diaphoresis, fever and unexpected weight change.   HENT: Negative for hearing loss, nosebleeds, sore throat and tinnitus.    Eyes: Negative for pain and visual disturbance.   Respiratory: Negative for cough, shortness of breath and wheezing.    Cardiovascular: Negative for chest pain and palpitations.   Gastrointestinal: Negative for abdominal pain, diarrhea, nausea and  "vomiting.   Endocrine: Negative for cold intolerance, heat intolerance and polydipsia.   Genitourinary: Negative for difficulty urinating, dysuria and hematuria.   Musculoskeletal: Positive for arthralgias. Negative for joint swelling and myalgias.   Skin: Negative for rash and wound.   Allergic/Immunologic: Negative for environmental allergies.   Neurological: Negative for dizziness, syncope and numbness.   Hematological: Does not bruise/bleed easily.   Psychiatric/Behavioral: Negative for dysphoric mood and sleep disturbance. The patient is not nervous/anxious.    All other systems reviewed and are negative.          Objective:  Physical Exam  General: No acute distress.  Eyes: conjunctiva clear; pupils equally round and reactive  ENT: external ears and nose atraumatic; oropharynx clear  CV: no peripheral edema  Resp: normal respiratory effort  Skin: no rashes or wounds; normal turgor  Psych: mood and affect appropriate; recent and remote memory intact    Vitals:    01/29/21 1357   Weight: 76.2 kg (168 lb)   Height: 139.7 cm (55\")         01/29/21  1357   Weight: 76.2 kg (168 lb)     Body mass index is 39.05 kg/m².      Ortho Exam     No change in exam from prior visit      Assessment:        1. Pain    2. Primary osteoarthritis of left knee    3. Complete tear of left rotator cuff, unspecified whether traumatic    4. Primary osteoarthritis, left shoulder           Plan:  1.  Discussed plan of care with patient and family.  Wishes to proceed corticosteroid injection last week in the subacromial aspect of the left shoulder.  Discussed if not improving colchicine meloxicam clinic in 4 to 6 weeks.  Can discuss receiving corticosteroid injection glenohumeral joint of the left shoulder.  Patient and spouse verbalized understanding of all information agrees with plan of care.  Continues to improve we will plan to see him back in clinic in 3 months repeat steroid injections.  All questions answered.  Large Joint " Arthrocentesis: L subacromial bursa  Date/Time: 1/29/2021 2:12 PM  Consent given by: patient  Site marked: site marked  Supporting Documentation  Indications: pain   Procedure Details  Location: shoulder - L subacromial bursa  Preparation: Patient was prepped and draped in the usual sterile fashion  Needle size: 22 G  Medications administered: 80 mg triamcinolone acetonide 40 MG/ML; 4 mL lidocaine PF 1% 1 %  Patient tolerance: patient tolerated the procedure well with no immediate complications    Large Joint Arthrocentesis: L knee  Date/Time: 1/29/2021 2:12 PM  Consent given by: patient  Site marked: site marked  Timeout: Immediately prior to procedure a time out was called to verify the correct patient, procedure, equipment, support staff and site/side marked as required   Supporting Documentation  Indications: pain   Procedure Details  Location: knee - L knee  Preparation: Patient was prepped and draped in the usual sterile fashion  Needle size: 22 G  Approach: superior  Medications administered: 8 mL lidocaine PF 1% 1 %; 80 mg triamcinolone acetonide 40 MG/ML  Patient tolerance: patient tolerated the procedure well with no immediate complications          1. Discussed plan of care with patient.   Orders:  Orders Placed This Encounter   Procedures   • Large Joint Arthrocentesis: L subacromial bursa   • Large Joint Arthrocentesis: L knee       Medications:  No orders of the defined types were placed in this encounter.      Followup:  No follow-ups on file.    Diagnoses and all orders for this visit:    1. Pain (Primary)  -     Large Joint Arthrocentesis: L subacromial bursa  -     Large Joint Arthrocentesis: L knee    2. Primary osteoarthritis of left knee    3. Complete tear of left rotator cuff, unspecified whether traumatic    4. Primary osteoarthritis, left shoulder      Dictated utilizing Dragon dictation

## 2021-02-04 NOTE — PROGRESS NOTES
Sandra Mane is a 75 y.o. female, who presents with a chief complaint of   Chief Complaint   Patient presents with   • Leg Swelling   • Skin Lesion     LRE       HPI     Pt presents to f/u on her chronic bilateral leg edema.  She takes furosemide 40 mg every morning.  She has not been able to use compression stockings.  She sometimes nicks the skin when she bumps against something and develops a wound that weeps.  She denies fevers, pain, change in symptoms.    The following portions of the patient's history were reviewed and updated as appropriate: allergies, current medications, past family history, past medical history, past social history, past surgical history and problem list.    Allergies: Patient has no known allergies.    Review of Systems   Constitutional: Negative for fever.   Respiratory: Negative.    Cardiovascular: Positive for leg swelling. Negative for chest pain and palpitations.   Musculoskeletal: Positive for back pain and gait problem.   Skin: Positive for wound. Negative for color change and rash.             Wt Readings from Last 3 Encounters:   02/04/21 80.7 kg (178 lb)   01/29/21 76.2 kg (168 lb)   01/07/21 76.2 kg (168 lb)     Temp Readings from Last 3 Encounters:   02/04/21 97.1 °F (36.2 °C) (Temporal)   01/07/21 97.3 °F (36.3 °C) (Temporal)   09/03/20 97.5 °F (36.4 °C) (Temporal)     BP Readings from Last 3 Encounters:   02/04/21 98/60   01/07/21 122/68   09/03/20 110/70     Pulse Readings from Last 3 Encounters:   02/04/21 82   01/07/21 62   09/03/20 70     Body mass index is 41.37 kg/m².  @LASTSAO2(3)@    Physical Exam  Vitals signs and nursing note reviewed.   Constitutional:       General: She is not in acute distress.     Comments: In wheelchair   HENT:      Head: Normocephalic and atraumatic.      Mouth/Throat:      Mouth: Mucous membranes are moist.   Eyes:      Extraocular Movements: Extraocular movements intact.      Conjunctiva/sclera: Conjunctivae normal.   Neck:       Musculoskeletal: Neck supple. No neck rigidity.   Pulmonary:      Effort: Pulmonary effort is normal. No respiratory distress.   Musculoskeletal:      Right lower leg: Edema (pitting, 1+ with a few scattered healing scabs.  No weeping.) present.      Left lower leg: Edema (pitting, 2+) present.   Neurological:      Mental Status: She is alert and oriented to person, place, and time. Mental status is at baseline.   Psychiatric:         Mood and Affect: Mood normal.         Behavior: Behavior normal.         Results for orders placed or performed in visit on 01/04/21   Urine Culture - Urine, Urine, Clean Catch    Specimen: Urine, Clean Catch    CU   Result Value Ref Range    Urine Culture Final report (A)     Result 1 Escherichia coli (A)     Susceptibility Testing Comment    Comprehensive Metabolic Panel    Specimen: Blood   Result Value Ref Range    Glucose 88 65 - 99 mg/dL    BUN 29 (H) 8 - 27 mg/dL    Creatinine 1.65 (H) 0.57 - 1.00 mg/dL    eGFR Non African Am 31 (L) >59 mL/min/1.73    eGFR African Am 35 (L) >59 mL/min/1.73    BUN/Creatinine Ratio 18 12 - 28    Sodium 137 134 - 144 mmol/L    Potassium 4.0 3.5 - 5.2 mmol/L    Chloride 97 96 - 106 mmol/L    Total CO2 26 20 - 29 mmol/L    Calcium 9.1 8.7 - 10.3 mg/dL    Total Protein 6.9 6.0 - 8.5 g/dL    Albumin 4.1 3.7 - 4.7 g/dL    Globulin 2.8 1.5 - 4.5 g/dL    A/G Ratio 1.5 1.2 - 2.2    Total Bilirubin 0.4 0.0 - 1.2 mg/dL    Alkaline Phosphatase 96 39 - 117 IU/L    AST (SGOT) 25 0 - 40 IU/L    ALT (SGPT) 19 0 - 32 IU/L   Lipid Panel With LDL / HDL Ratio    Specimen: Blood   Result Value Ref Range    Total Cholesterol 236 (H) 100 - 199 mg/dL    Triglycerides 215 (H) 0 - 149 mg/dL    HDL Cholesterol 43 >39 mg/dL    VLDL Cholesterol Taco 39 5 - 40 mg/dL    LDL Chol Calc (NIH) 154 (H) 0 - 99 mg/dL    LDL/HDL RATIO 3.6 (H) 0.0 - 3.2 ratio   TSH    Specimen: Blood   Result Value Ref Range    TSH 2.160 0.450 - 4.500 uIU/mL   T4, Free    Specimen: Blood   Result Value  Ref Range    Free T4 1.15 0.82 - 1.77 ng/dL   CBC & Differential    Specimen: Blood   Result Value Ref Range    WBC 5.5 3.4 - 10.8 x10E3/uL    RBC 3.37 (L) 3.77 - 5.28 x10E6/uL    Hemoglobin 10.4 (L) 11.1 - 15.9 g/dL    Hematocrit 32.2 (L) 34.0 - 46.6 %    MCV 96 79 - 97 fL    MCH 30.9 26.6 - 33.0 pg    MCHC 32.3 31.5 - 35.7 g/dL    RDW 14.0 11.7 - 15.4 %    Platelets 191 150 - 450 x10E3/uL    Neutrophil Rel % 64 Not Estab. %    Lymphocyte Rel % 22 Not Estab. %    Monocyte Rel % 10 Not Estab. %    Eosinophil Rel % 3 Not Estab. %    Basophil Rel % 1 Not Estab. %    Neutrophils Absolute 3.6 1.4 - 7.0 x10E3/uL    Lymphocytes Absolute 1.2 0.7 - 3.1 x10E3/uL    Monocytes Absolute 0.5 0.1 - 0.9 x10E3/uL    Eosinophils Absolute 0.2 0.0 - 0.4 x10E3/uL    Basophils Absolute 0.0 0.0 - 0.2 x10E3/uL    Immature Granulocyte Rel % 0 Not Estab. %    Immature Grans Absolute 0.0 0.0 - 0.1 x10E3/uL           Diagnoses and all orders for this visit:    1. Dependent edema (Primary)  -     Ambulatory Referral to Home Health      Chronic, stable.  No evidence of cellulitis or a change that is concerning for DVT.  Some healing wounds.  Pt is uncomfortable and unable to use compression stockings.  Will see if home health can evaluate her for possible [place] boots.  Take an extra 20 mg furosemide in the afternoon for 3 days.    Outpatient Medications Prior to Visit   Medication Sig Dispense Refill   • clonazePAM (KlonoPIN) 0.5 MG tablet TAKE TWO TABLETS BY MOUTH ONCE NIGHTLY AS NEEDED 60 tablet 5   • FLUoxetine (PROzac) 20 MG tablet Take 2 tablets by mouth Daily. 60 tablet 5   • fluticasone (FLONASE) 50 MCG/ACT nasal spray 2 sprays into the nostril(s) as directed by provider Daily.     • furosemide (LASIX) 20 MG tablet TAKE TWO TABLETS BY MOUTH DAILY 60 tablet 3   • gabapentin (NEURONTIN) 400 MG capsule Take 400 mg by mouth every 4 (four) hours.     • memantine (NAMENDA) 10 MG tablet Take 1 tablet by mouth Daily. 90 tablet 0   • Morphine  (MSIR) 30 MG tablet 60 mg 3 (Three) Times a Day.     • omeprazole (priLOSEC) 20 MG capsule Take 20 mg by mouth.     • potassium chloride 10 MEQ CR tablet TAKE TWO TABLETS BY MOUTH DAILY 60 tablet 3     No facility-administered medications prior to visit.      No orders of the defined types were placed in this encounter.    [unfilled]  There are no discontinued medications.      Return for Next scheduled follow up.

## 2021-02-05 NOTE — TELEPHONE ENCOUNTER
[facility] CALLING TO SAY THEY CANNOT TAKE ON THIS PT AT THIS TIME.     THEY SAID TO TRY CALLING NEXT WEEK.     JUST WANTED TO LET YOU KNOW.

## 2021-02-08 NOTE — TELEPHONE ENCOUNTER
[facility] does not have a contract with [facility] so they will not be able to see patient.

## 2021-03-06 PROBLEM — A41.9 SEPSIS (HCC): Status: ACTIVE | Noted: 2021-01-01

## 2021-03-06 NOTE — ED PROVIDER NOTES
EMERGENCY DEPARTMENT ENCOUNTER      Room Number: ICU5/1    History is provided by the patient, no translation services needed    HPI:    Chief complaint: Altered mental status    Location: At home    Quality/Severity: Moderate    Timing/Duration: Today    Modifying Factors: patient has a history of dementia    Associated Symptoms: No fever, no vomiting, no pain    Narrative: Pt is a 76 y.o. female who presents complaining of altered mental status brought in by EMS.  Patient has been called EMS today due to the fact that the patient was not acting her normal self.   states that patient normally walks around on her own and is eating well but today she has been not able to dress herself or move.   states that this morning patient was up had difficulty using the bath until he helped her.  Patient states around 10:00 patient refused to get up or move and was very sleepy.  Patient denies any pain at this time.      PMD: Sriram Blanton MD    REVIEW OF SYSTEMS  Review of Systems   Reason unable to perform ROS: Partial ROS due to patient's dementia.   Constitutional: Positive for activity change, appetite change and fatigue.   Skin: Negative for rash and wound.         PAST MEDICAL HISTORY  Active Ambulatory Problems     Diagnosis Date Noted   • Depression with anxiety    • Back pain    • Arthritis    • CKD (chronic kidney disease) stage 3, GFR 30-59 ml/min (CMS/[facility]) 05/16/2016   • Hyperlipidemia 05/16/2016   • GERD (gastroesophageal reflux disease) 02/27/2017   • Diplopia 03/23/2017   • Memory changes 03/23/2017   • Snoring 03/23/2017   • Anemia 05/22/2017   • Head injury 10/10/2017   • Late onset Alzheimer's disease without behavioral disturbance (CMS/[facility]) 10/10/2017   • Ecchymosis of right eye 10/10/2017   • Dependent edema 07/02/2018   • OAB (overactive bladder) 07/02/2018   • Chronic pain of left knee 06/27/2019   • Primary osteoarthritis of left knee 07/25/2019   • Greater trochanteric bursitis of  both hips 09/04/2019   • Routine health maintenance 10/31/2019   • Non-healing ulcer of lower leg, left, limited to breakdown of skin (CMS/HCC) 12/03/2019   • Bilateral hip pain 03/03/2020   • Complete tear of left rotator cuff 07/31/2020   • Primary osteoarthritis, left shoulder 07/31/2020     Resolved Ambulatory Problems     Diagnosis Date Noted   • Acute cystitis 05/16/2016     Past Medical History:   Diagnosis Date   • Anxiety    • Depression    • Scoliosis        PAST SURGICAL HISTORY  Past Surgical History:   Procedure Laterality Date   • COLONOSCOPY     • JOINT REPLACEMENT     • REPLACEMENT TOTAL KNEE Right    • SPINAL CORD STIMULATOR IMPLANT  2011   • SPINAL CORD STIMULATOR IMPLANT         FAMILY HISTORY  Family History   Problem Relation Age of Onset   • Cancer Father    • COPD Father    • Breast cancer Paternal Aunt        SOCIAL HISTORY  Social History     Socioeconomic History   • Marital status:      Spouse name: Not on file   • Number of children: Not on file   • Years of education: Not on file   • Highest education level: Not on file   Tobacco Use   • Smoking status: Never Smoker   • Smokeless tobacco: Never Used   Substance and Sexual Activity   • Alcohol use: No   • Drug use: No   • Sexual activity: Defer       ALLERGIES  Patient has no known allergies.      Current Facility-Administered Medications:   •  acetaminophen (TYLENOL) tablet 650 mg, 650 mg, Oral, Q4H PRN **OR** acetaminophen (TYLENOL) suppository 650 mg, 650 mg, Rectal, Q4H PRN, Clementina Moss MD  •  albuterol (PROVENTIL) nebulizer solution 0.083% 2.5 mg/3mL, 2.5 mg, Nebulization, Q6H PRN, Clementina Moss MD  •  bisacodyl (DULCOLAX) suppository 10 mg, 10 mg, Rectal, Daily PRN, Clementina Moss MD  •  [START ON 3/7/2021] cefepime (MAXIPIME) IVPB 2 g/50ml D5W (premix), 2 g, Intravenous, Q24H, Clementina Moss MD  •  enoxaparin (LOVENOX) syringe 30 mg, 30 mg, Subcutaneous, Q24H, Clementina Moss MD, 30 mg  at 03/06/21 2059  •  lactated ringers infusion, 150 mL/hr, Intravenous, Continuous, Clementina Moss MD, Last Rate: 150 mL/hr at 03/06/21 1927, 150 mL/hr at 03/06/21 1927  •  ondansetron (ZOFRAN) tablet 4 mg, 4 mg, Oral, Q6H PRN **OR** ondansetron (ZOFRAN) injection 4 mg, 4 mg, Intravenous, Q6H PRN, Clementina Moss MD  •  Pharmacy to dose vancomycin, , Does not apply, Continuous PRN, Clementina Moss MD  •  [COMPLETED] Insert peripheral IV, , , Once **AND** sodium chloride 0.9 % flush 10 mL, 10 mL, Intravenous, PRN, Clementina Moss MD  •  sodium chloride 0.9 % flush 10 mL, 10 mL, Intravenous, PRN, Clementina Moss MD, 10 mL at 03/06/21 2059  •  sodium chloride 0.9 % flush 10 mL, 10 mL, Intravenous, Q12H, Clementina Moss MD, 10 mL at 03/06/21 2112  •  sodium chloride 0.9 % infusion  - ADS Override Pull, , , ,   •  sodium chloride 0.9 % infusion  - ADS Override Pull, , , ,   •  sodium chloride 0.9 % infusion 40 mL, 40 mL, Intravenous, PRN, Clementina Moss MD, 40 mL at 03/06/21 2103  •  vancomycin (VANCOCIN) 1,500 mg in sodium chloride 0.9 % 500 mL IVPB, 20 mg/kg, Intravenous, Once **AND** [DISCONTINUED] Pharmacy to dose vancomycin, , Does not apply, Continuous NAEEM, Clementina Moss MD  •  vancomycin (VANCOCIN) 500 MG injection  - ADS Override Pull, , , ,     PHYSICAL EXAM  ED Triage Vitals [03/06/21 1407]   Temp Heart Rate Resp BP SpO2   98.6 °F (37 °C) 80 16 91/61 90 %      Temp src Heart Rate Source Patient Position BP Location FiO2 (%)   Oral Monitor Lying Right arm --       Physical Exam  Vitals and nursing note reviewed.   Constitutional:       Appearance: She is ill-appearing.   HENT:      Head: Normocephalic and atraumatic.      Mouth/Throat:      Lips: Pink.      Mouth: Mucous membranes are dry.   Eyes:      Conjunctiva/sclera: Conjunctivae normal.   Cardiovascular:      Rate and Rhythm: Normal rate and regular rhythm.      Heart sounds: Normal heart sounds.      Pulmonary:      Effort: Pulmonary effort is normal. No respiratory distress.      Breath sounds: Normal breath sounds.   Abdominal:      General: Bowel sounds are normal. There is no distension.      Palpations: Abdomen is soft.      Tenderness: There is no abdominal tenderness.   Musculoskeletal:         General: Normal range of motion.      Cervical back: Normal range of motion and neck supple.        Legs:    Skin:     General: Skin is warm and dry.   Neurological:      Mental Status: She is lethargic.      GCS: GCS eye subscore is 4. GCS verbal subscore is 5. GCS motor subscore is 6.   Psychiatric:         Mood and Affect: Mood and affect normal.           LAB RESULTS  Lab Results (last 24 hours)     Procedure Component Value Units Date/Time    CBC & Differential [[id number]]  (Abnormal) Collected: 03/06/21 1424    Specimen: Blood Updated: 03/06/21 1437    Narrative:      The following orders were created for panel order CBC & Differential.  Procedure                               Abnormality         Status                     ---------                               -----------         ------                     CBC Auto Differential[[id number]]        Abnormal            Final result                 Please view results for these tests on the individual orders.    Comprehensive Metabolic Panel [[id number]]  (Abnormal) Collected: 03/06/21 1424    Specimen: Blood Updated: 03/06/21 1503     Glucose 92 mg/dL      BUN 43 mg/dL      Creatinine 2.13 mg/dL      Sodium 141 mmol/L      Potassium 4.5 mmol/L      Chloride 103 mmol/L      CO2 28.2 mmol/L      Calcium 9.1 mg/dL      Total Protein 7.2 g/dL      Albumin 3.60 g/dL      ALT (SGPT) 18 U/L      AST (SGOT) 25 U/L      Alkaline Phosphatase 85 U/L      Total Bilirubin 0.5 mg/dL      eGFR Non African Amer 23 mL/min/1.73      Globulin 3.6 gm/dL      A/G Ratio 1.0 g/dL      BUN/Creatinine Ratio 20.2     Anion Gap 9.8 mmol/L     Narrative:      GFR Normal >60  Chronic  Kidney Disease <60  Kidney Failure <15      Troponin [[id number]]  (Normal) Collected: 03/06/21 1424    Specimen: Blood Updated: 03/06/21 1503     Troponin T 0.024 ng/mL     Narrative:      Troponin T Reference Range:  <= 0.03 ng/mL-   Negative for AMI  >0.03 ng/mL-     Abnormal for myocardial necrosis.  Clinicians would have to utilize clinical acumen, EKG, Troponin and serial changes to determine if it is an Acute Myocardial Infarction or myocardial injury due to an underlying chronic condition.       Results may be falsely decreased if patient taking Biotin.      CBC Auto Differential [[id number]]  (Abnormal) Collected: 03/06/21 1424    Specimen: Blood Updated: 03/06/21 1437     WBC 10.09 10*3/mm3      RBC 3.63 10*6/mm3      Hemoglobin 10.8 g/dL      Hematocrit 35.6 %      MCV 98.1 fL      MCH 29.8 pg      MCHC 30.3 g/dL      RDW 14.5 %      RDW-SD 52.3 fl      MPV 9.7 fL      Platelets 187 10*3/mm3      Neutrophil % 80.0 %      Lymphocyte % 8.2 %      Monocyte % 10.7 %      Eosinophil % 0.6 %      Basophil % 0.2 %      Immature Grans % 0.3 %      Neutrophils, Absolute 8.07 10*3/mm3      Lymphocytes, Absolute 0.83 10*3/mm3      Monocytes, Absolute 1.08 10*3/mm3      Eosinophils, Absolute 0.06 10*3/mm3      Basophils, Absolute 0.02 10*3/mm3      Immature Grans, Absolute 0.03 10*3/mm3      nRBC 0.0 /100 WBC     Lactic Acid, Plasma [[id number]]  (Normal) Collected: 03/06/21 1424    Specimen: Blood Updated: 03/06/21 1452     Lactate 1.1 mmol/L     Procalcitonin [[id number]]  (Abnormal) Collected: 03/06/21 1424    Specimen: Blood Updated: 03/06/21 1503     Procalcitonin 3.38 ng/mL     Narrative:      Results may be falsely decreased if patient taking Biotin.     Magnesium [[id number]]  (Normal) Collected: 03/06/21 1424    Specimen: Blood Updated: 03/06/21 1910     Magnesium 2.1 mg/dL     Phosphorus [[id number]]  (Normal) Collected: 03/06/21 1424    Specimen: Blood Updated: 03/06/21 1910     Phosphorus 3.6 mg/dL      Hemoglobin A1c [[id number]]  (Normal) Collected: 03/06/21 1424    Specimen: Blood Updated: 03/06/21 1914     Hemoglobin A1C 5.20 %     Narrative:      Hemoglobin A1C Ranges:    Increased Risk for Diabetes  5.7% to 6.4%  Diabetes                     >= 6.5%  Diabetic Goal                < 7.0%    TSH [[id number]]  (Normal) Collected: 03/06/21 1424    Specimen: Blood Updated: 03/06/21 1923     TSH 1.530 uIU/mL     Urinalysis With Microscopic If Indicated (No Culture) - Urine, Catheter [[id number]]  (Abnormal) Collected: 03/06/21 1436    Specimen: Urine, Catheter Updated: 03/06/21 1448     Color, UA Yellow     Appearance, UA Cloudy     pH, UA 6.0     Specific Gravity, UA 1.015     Glucose, UA Negative     Ketones, UA Negative     Bilirubin, UA Negative     Blood, UA Small (1+)     Protein, UA Negative     Leuk Esterase, UA Large (3+)     Nitrite, UA Positive     Urobilinogen, UA 0.2 E.U./dL    Urinalysis, Microscopic Only - Urine, Catheter [[id number]]  (Abnormal) Collected: 03/06/21 1436    Specimen: Urine, Catheter Updated: 03/06/21 1451     RBC, UA 3-5 /HPF      WBC, UA 31-50 /HPF      Bacteria, UA 4+ /HPF      Squamous Epithelial Cells, UA 0-2 /HPF      Hyaline Casts, UA None Seen /LPF      Amorphous Crystals, UA Moderate/2+ /HPF      Methodology Manual Light Microscopy    Blood Culture - Blood, Arm, Left [[id number]] Collected: 03/06/21 1619    Specimen: Blood from Arm, Left Updated: 03/06/21 1637    COVID-19 and FLU A/B PCR - Swab, Nasopharynx [[id number]]  (Normal) Collected: 03/06/21 1624    Specimen: Swab from Nasopharynx Updated: 03/06/21 1658     COVID19 Not Detected     Influenza A PCR Not Detected     Influenza B PCR Not Detected    Narrative:      Fact sheet for providers: https://www.fda.gov/media/813456/download    Fact sheet for patients: https://www.fda.gov/media/829350/download    Test performed by PCR.    Blood Culture - Blood, Hand, Right [[id number]] Collected: 03/06/21 1638    Specimen: Blood from Hand,  Right Updated: 03/06/21 1638            I ordered the above labs and reviewed the results    RADIOLOGY  CT Head Without Contrast    Result Date: 3/6/2021  CT Head WO HISTORY: Dizziness and lethargy upon waking today TECHNIQUE: Routine noncontrast head CT. Radiation dose reduction techniques included automated exposure control or exposure modulation based on body size. Radiation audit for CT and nuclear cardiology exams in the last 12 months: 0. COMPARISON: 3/28/2017 FINDINGS: No acute intracranial hemorrhage, mass lesion, or abnormal extra-axial fluid collection. No midline shift or focal mass effect. Ventricular system is normal in size and configuration. . The gray-white matter differentiation is preserved. Visualized paranasal sinuses are clear. Visualized mastoid air cells are clear. No acute osseous abnormality.     1.  No acute intracranial abnormality. Signer Name: CALOS COLE MD  Signed: 3/6/2021 3:58 PM  Workstation Name: [facility]  Radiology [facility]    XR Chest 1 View    Result Date: 3/6/2021  CR Chest 1 Vw INDICATION: Altered mental status and lethargy today COMPARISON:  None available. FINDINGS: Single portable AP view(s) of the chest.  Left heart border is obscured by left basilar airspace disease. There is patchy airspace disease throughout the left base. Small left pleural effusion. Mild prominence of pulmonary vasculature which could suggest underlying mild pulmonary vascular congestion. No acute bony abnormality.     Airspace disease at the left base with left pleural effusion. Signer Name: CALOS COLE MD  Signed: 3/6/2021 3:58 PM  Workstation Name: [facility]  Radiology [facility]      I ordered the above radiologic testing and reviewed the results    PROCEDURES  Procedures      PROGRESS AND CONSULTS  ED Course as of Mar 06 2224   Sat Mar 06, 2021   1439 EKG         EKG time / Interpretation time: 1414/1416  Rhythm/Rate: Sinus rhythm/77   AZ: 137  QRS, axis: 7,  normal axis  QTc 556  ST and T waves: No significant ST elevation or depression, no T wave abnormality  EKG Tracing Interpreted Contemporaneously by me, independently viewed by me and MD.        [GT]   1548 WBC, UA(!): 31-50 [GT]   1549 Bacteria, UA(!): 4+ [GT]   1549 WBC: 10.09 [GT]   1549 Creatinine(!): 2.13 [GT]   1549 BUN(!): 43 [GT]   2220 76-year-old female brought in by EMS with complaints of altered mental status.  Patient does have a history of dementia.  Patient's  states that patient was unable to dress herself and was acting unusual this morning.  After history and physical exam patient is noted to be lethargic and responds to audible stimuli.  Patient is noted to have a soft and nontender abdomen.  Patient is noted to have mild pitting edema in her left extremity.  Patient is oriented x1.  Laboratory studies were done showing a nitrite positive urinalysis with 31-50 WBCs.  Patient's post calcitonin was elevated at 3.38.  Patient was not septic with a lactate of 1.1.  Patient's BUN and creatinine were elevated showing acute kidney injury with a BUN of 43 and creatinine of 2.13.  Patient was shown to have a negative troponin.  Patient was negative for Covid or flu.  Patient's checks x-ray showed an airspace disease in her left lower lung.  Patient CT scan of her head showed no intracranial acute disease process.  I discussed these results with his .  Discussed with patient's  that due to the fact that she had 2 infections and that she was altered that she would need to be admitted to the hospital.  Patient was given 2 g of Rocephin IV while in the ED.  Prior to given antibiotics blood cultures were drawn.  Patient also was given 2 L of fluid while in the ED.  Patient's blood pressure remained low while in the ED but improved with fluids.  Dr. Moss, hospitalist, was consulted regarding admission.  She has accepted patient to the ICU.  Patient is stable at this time and will be  admitted to the ICU.    [GT]      ED Course User Index  [GT] Devika Vaughn PA-C           MEDICAL DECISION MAKING    MDM       DIAGNOSIS  Final diagnoses:   Urinary tract infection in female   Pneumonia of left lower lobe due to infectious organism       Latest Documented Vital Signs:  As of 22:24 EST  BP- (!) 85/47 HR- 61 Temp- 97.4 °F (36.3 °C) (Axillary) O2 sat- 97%    DISPOSITION  Admitted to [facility]        Discussed pertinent labs and imaging findings with the patient/family.  Patient/Family voiced understanding of need to follow-up for recheck, further testing as needed.  Return to the emergency Department warnings were given.         Medication List      No changes were made to your prescriptions during this visit.                   Dictated utilizing Dragon dictation     Devika Vaughn PA-C  03/06/21 3880

## 2021-03-06 NOTE — H&P
"[facility] HOSPITALIST     Sriram Blanton MD    CHIEF COMPLAINT: Hypotension    HISTORY OF PRESENT ILLNESS:    75 yo WF w/ PMH of Dementia (?  Severity), Chronic Back pain s/p Spinal cord stimulator implant, Depression/anxiety, and unintentional wt loss who presented via EMS this Afternoon for evaluation of AMS. per ER report the  noted the patient seemed more lethargic today as she was not able to dress herself or move, had a hard time getting her up and using the bath so we had to assist her, which is not usual.  Then around 10 AM patient refused to get up or move and was very sleepy, so EMS was called and presented to the ER.  Where she was found to have a urinary tract infection, NEIL and hypotension.  She does appear very dry on exam, even after getting more than 2 L down in the ER.    On exam this evening no family at bedside, and patient very altered.  ICU nurse stated that when she got to the floor about 30 minutes prior that patient had helped the nurse transfer from the stretcher to the bed, and gave some 1-2 word answers.  However on my exam patient would open her eyes to touch, responded and localized to touch in all 4 extremities, would briefly make eye contact, and patient opened her mouth wide when this provider stated out loud  \"your tongue is very dry, and cracked \".    Baseline by report from the ER is that the  states that she normally walks around on her own. Review of recent clinic notes seem to indicate that patient is able to answer questions about her condition appropriately, though no real clear documentation of her baseline functional status.  Did get a cortico steroid injection of her left knee by Ortho nurse practitioner on 1/29/2021    Patient takes Lasix for chronic dependent edema, no echo in [facility] or [employer] system    Unable to get any other history except what was obtained above from the nurse, the ER PA, and review of the chart.    Unable to " obtain review of systems      Past Medical History:   Diagnosis Date   • Anxiety    • Arthritis    • Back pain     SEES DR. BERRY FOR THIS   • Depression    • Scoliosis      Past Surgical History:   Procedure Laterality Date   • COLONOSCOPY     • JOINT REPLACEMENT     • REPLACEMENT TOTAL KNEE Right    • SPINAL CORD STIMULATOR IMPLANT  2011   • SPINAL CORD STIMULATOR IMPLANT       Family History   Problem Relation Age of Onset   • Cancer Father    • COPD Father    • Breast cancer Paternal Aunt      Social History     Tobacco Use   • Smoking status: Never Smoker   • Smokeless tobacco: Never Used   Substance Use Topics   • Alcohol use: No   • Drug use: No     Medications Prior to Admission   Medication Sig Dispense Refill Last Dose   • furosemide (LASIX) 20 MG tablet TAKE TWO TABLETS BY MOUTH DAILY 60 tablet 3 3/5/2021 at Unknown time   • gabapentin (NEURONTIN) 400 MG capsule Take 400 mg by mouth every 4 (four) hours.   3/5/2021 at Unknown time   • memantine (NAMENDA) 10 MG tablet TAKE ONE TABLET BY MOUTH DAILY 90 tablet 1 3/5/2021 at  2300   • potassium chloride 10 MEQ CR tablet TAKE TWO TABLETS BY MOUTH DAILY 60 tablet 3 3/5/2021 at Unknown time   • clonazePAM (KlonoPIN) 0.5 MG tablet TAKE TWO TABLETS BY MOUTH ONCE NIGHTLY AS NEEDED 60 tablet 5 3/5/2021 at 2330   • FLUoxetine (PROzac) 20 MG tablet Take 2 tablets by mouth Daily. 60 tablet 5 3/6/2021 at 1000   • fluticasone (FLONASE) 50 MCG/ACT nasal spray 2 sprays into the nostril(s) as directed by provider Daily.   Unknown at Unknown time   • Morphine (MSIR) 30 MG tablet 60 mg 3 (Three) Times a Day.   3/6/2021 at 1000   • omeprazole (priLOSEC) 20 MG capsule Take 20 mg by mouth.        Allergies:  Patient has no known allergies.  Debilities: As per HPI and Physical Exam.     REVIEW OF SYSTEMS:  Please see the above history of present illness for pertinent positives and negatives.  The remainder of the patient's systems have been reviewed and are negative.     Vital  "Signs  Temp:  [98 °F (36.7 °C)-98.6 °F (37 °C)] 98 °F (36.7 °C)  Heart Rate:  [61-80] 61  Resp:  [12-16] 12  BP: ()/(40-63) 85/47    Flowsheet Rows      First Filed Value   Admission Height  165.1 cm (65\") Documented at 03/06/2021 1407   Admission Weight  71.5 kg (157 lb 9.6 oz) Documented at 03/06/2021 1407           Physical Exam:  Physical Exam  Vitals and nursing note reviewed.   Constitutional:       General: She is not in acute distress.     Appearance: She is ill-appearing and toxic-appearing. She is not diaphoretic.   HENT:      Head: Normocephalic and atraumatic.      Nose: Nose normal. No rhinorrhea.      Mouth/Throat:      Mouth: Mucous membranes are dry.      Comments: Cobblestone tongue, unable to visualize most of posterior oropharynx  Eyes:      General: No scleral icterus.        Right eye: No discharge.         Left eye: No discharge.      Conjunctiva/sclera: Conjunctivae normal.      Pupils: Pupils are equal, round, and reactive to light.      Comments: EOMI grossly intact   Cardiovascular:      Rate and Rhythm: Normal rate and regular rhythm.      Heart sounds: Normal heart sounds.   Pulmonary:      Effort: Pulmonary effort is normal. No respiratory distress.      Breath sounds: Rhonchi present. No wheezing or rales.      Comments: Rhonchi may have been transmitted upper airway sounds  Abdominal:      General: Bowel sounds are normal.      Palpations: Abdomen is soft.      Tenderness: There is abdominal tenderness. There is no guarding or rebound.      Comments: Questionable mild to moderate gaseous distention, suprapubic tenderness greater than right lower quadrant tenderness, not an acute surgical abdomen   Musculoskeletal:      Comments: Trace pitting edema of the bilateral extremities, on what may be chronic lymphedema   Skin:     General: Skin is dry.      Coloration: Skin is not jaundiced.      Findings: No bruising, erythema or rash.      Comments: Positive skin turgor   Neurological: "      Mental Status: She is lethargic.      GCS: GCS eye subscore is 3. GCS verbal subscore is 1. GCS motor subscore is 5.      Motor: No tremor, abnormal muscle tone or seizure activity.      Deep Tendon Reflexes: Babinski sign absent on the right side. Babinski sign absent on the left side.      Comments: No obvious focal neuro deficit          Results Review:    I reviewed the patient's new clinical results.  Lab Results (most recent)     Procedure Component Value Units Date/Time    COVID-19 and FLU A/B PCR - Swab, Nasopharynx [[id number]] Collected: 03/06/21 1624    Specimen: Swab from Nasopharynx Updated: 03/06/21 1643    Blood Culture - Blood, Hand, Right [[id number]] Collected: 03/06/21 1638    Specimen: Blood from Hand, Right Updated: 03/06/21 1638    Blood Culture - Blood, Arm, Left [[id number]] Collected: 03/06/21 1619    Specimen: Blood from Arm, Left Updated: 03/06/21 1637    Procalcitonin [[id number]]  (Abnormal) Collected: 03/06/21 1424    Specimen: Blood Updated: 03/06/21 1503     Procalcitonin 3.38 ng/mL     Narrative:      Results may be falsely decreased if patient taking Biotin.     Comprehensive Metabolic Panel [[id number]]  (Abnormal) Collected: 03/06/21 1424    Specimen: Blood Updated: 03/06/21 1503     Glucose 92 mg/dL      BUN 43 mg/dL      Creatinine 2.13 mg/dL      Sodium 141 mmol/L      Potassium 4.5 mmol/L      Chloride 103 mmol/L      CO2 28.2 mmol/L      Calcium 9.1 mg/dL      Total Protein 7.2 g/dL      Albumin 3.60 g/dL      ALT (SGPT) 18 U/L      AST (SGOT) 25 U/L      Alkaline Phosphatase 85 U/L      Total Bilirubin 0.5 mg/dL      eGFR Non African Amer 23 mL/min/1.73      Globulin 3.6 gm/dL      A/G Ratio 1.0 g/dL      BUN/Creatinine Ratio 20.2     Anion Gap 9.8 mmol/L     Narrative:      GFR Normal >60  Chronic Kidney Disease <60  Kidney Failure <15      Troponin [[id number]]  (Normal) Collected: 03/06/21 1424    Specimen: Blood Updated: 03/06/21 1503     Troponin T 0.024 ng/mL      Narrative:      Troponin T Reference Range:  <= 0.03 ng/mL-   Negative for AMI  >0.03 ng/mL-     Abnormal for myocardial necrosis.  Clinicians would have to utilize clinical acumen, EKG, Troponin and serial changes to determine if it is an Acute Myocardial Infarction or myocardial injury due to an underlying chronic condition.       Results may be falsely decreased if patient taking Biotin.      Lactic Acid, Plasma [[id number]]  (Normal) Collected: 03/06/21 1424    Specimen: Blood Updated: 03/06/21 1452     Lactate 1.1 mmol/L     Urinalysis, Microscopic Only - Urine, Catheter [[id number]]  (Abnormal) Collected: 03/06/21 1436    Specimen: Urine, Catheter Updated: 03/06/21 1451     RBC, UA 3-5 /HPF      WBC, UA 31-50 /HPF      Bacteria, UA 4+ /HPF      Squamous Epithelial Cells, UA 0-2 /HPF      Hyaline Casts, UA None Seen /LPF      Amorphous Crystals, UA Moderate/2+ /HPF      Methodology Manual Light Microscopy    Urinalysis With Microscopic If Indicated (No Culture) - Urine, Catheter [[id number]]  (Abnormal) Collected: 03/06/21 1436    Specimen: Urine, Catheter Updated: 03/06/21 1448     Color, UA Yellow     Appearance, UA Cloudy     pH, UA 6.0     Specific Gravity, UA 1.015     Glucose, UA Negative     Ketones, UA Negative     Bilirubin, UA Negative     Blood, UA Small (1+)     Protein, UA Negative     Leuk Esterase, UA Large (3+)     Nitrite, UA Positive     Urobilinogen, UA 0.2 E.U./dL    CBC & Differential [[id number]]  (Abnormal) Collected: 03/06/21 1424    Specimen: Blood Updated: 03/06/21 1437    Narrative:      The following orders were created for panel order CBC & Differential.  Procedure                               Abnormality         Status                     ---------                               -----------         ------                     CBC Auto Differential[[id number]]        Abnormal            Final result                 Please view results for these tests on the individual orders.    CBC Auto  Differential [[id number]]  (Abnormal) Collected: 03/06/21 1424    Specimen: Blood Updated: 03/06/21 1437     WBC 10.09 10*3/mm3      RBC 3.63 10*6/mm3      Hemoglobin 10.8 g/dL      Hematocrit 35.6 %      MCV 98.1 fL      MCH 29.8 pg      MCHC 30.3 g/dL      RDW 14.5 %      RDW-SD 52.3 fl      MPV 9.7 fL      Platelets 187 10*3/mm3      Neutrophil % 80.0 %      Lymphocyte % 8.2 %      Monocyte % 10.7 %      Eosinophil % 0.6 %      Basophil % 0.2 %      Immature Grans % 0.3 %      Neutrophils, Absolute 8.07 10*3/mm3      Lymphocytes, Absolute 0.83 10*3/mm3      Monocytes, Absolute 1.08 10*3/mm3      Eosinophils, Absolute 0.06 10*3/mm3      Basophils, Absolute 0.02 10*3/mm3      Immature Grans, Absolute 0.03 10*3/mm3      nRBC 0.0 /100 WBC           Imaging Results (Most Recent)     Procedure Component Value Units Date/Time    XR Chest 1 View [[id number]] Collected: 03/06/21 1558     Updated: 03/06/21 1600    Narrative:      CR Chest 1 Vw    INDICATION:   Altered mental status and lethargy today     COMPARISON:    None available.    FINDINGS:  Single portable AP view(s) of the chest.  Left heart border is obscured by left basilar airspace disease. There is patchy airspace disease throughout the left base. Small left pleural effusion. Mild prominence of pulmonary vasculature which could suggest  underlying mild pulmonary vascular congestion. No acute bony abnormality.      Impression:      Airspace disease at the left base with left pleural effusion.    Signer Name: CALOS COLE MD   Signed: 3/6/2021 3:58 PM   Workstation Name: [id number]    [facility]    CT Head Without Contrast [[id number]] Collected: 03/06/21 1558     Updated: 03/06/21 1600    Narrative:      CT Head WO    HISTORY:   Dizziness and lethargy upon waking today    TECHNIQUE:   Routine noncontrast head CT. Radiation dose reduction techniques included automated exposure control or exposure modulation based on body size. Radiation  audit for CT and nuclear cardiology exams in the last 12 months: 0.    COMPARISON:   3/28/2017    FINDINGS:       No acute intracranial hemorrhage, mass lesion, or abnormal extra-axial fluid collection. No midline shift or focal mass effect. Ventricular system is normal in size and configuration. .    The gray-white matter differentiation is preserved.    Visualized paranasal sinuses are clear. Visualized mastoid air cells are clear.    No acute osseous abnormality.        Impression:        1.  No acute intracranial abnormality.    Signer Name: CALOS COLE MD   Signed: 3/6/2021 3:58 PM   Workstation Name: [id number]    [facility]          ECG/EMG Results (most recent)     Procedure Component Value Units Date/Time    ECG 12 Lead [[id number]] Collected: 03/06/21 1414     Updated: 03/06/21 1417     QT Interval 491 ms     Narrative:      HEART RATE= 77  bpm  RR Interval= 780  ms  FL Interval= 137  ms  P Horizontal Axis=   deg  P Front Axis= 32  deg  QRSD Interval= 99  ms  QT Interval= 491  ms  QRS Axis= 7  deg  T Wave Axis= 15  deg  - ABNORMAL ECG -  Sinus rhythm  Prolonged QT interval  Electronically Signed By:   Date and Time of Study: 2021-03-06 14:14:34          Independent read of above EKG image, baseline with significant artifact, unclear if patient actually has as prolonged of QTC as calculated, will repeat    Assessment/Plan     Sepsis probably from UTI, but could have other sources with NEIL  -Initially started on ceftriaxone, but after exam and review of records, feel that her elevated pro-Taco may indicate infection from a different source, will broaden to Vanco and cefepime for now  -Patient very likely is developing septic shock, already has gotten greater than 30cc/kg bolus, but does still appear volume down, so we will still attempt to resuscitate with fluids  -Does have chronic lower extremity edema, unclear if this is lymphedema, or representing undiagnosed chronic heart  failure, will not be able to get an echo cardiogram tomorrow, so will monitor  -Patient initially got to the unit with blood pressures of 110s over 70s, however blood pressures have dropped again to 80s over 40s, will bolus 1 more liter, discussed with nurse to get second peripheral IV with an 18-gauge preferred  -Suspect that patient will likely need pressors within the next couple hours, and less she substantially improves after the next fluid bolus  -Monitor urine output, with bladder scans in order to make sure that patient is not having urinary retention or obstruction  -Blood and urine cultures pending  -Did not get a CT scan of her abdomen pelvis down in the ER, pain is more of suprapubic, and left lower quadrant, however may need CT scan if patient is not improving, or if abdominal pain worsens    Altered mental status due to metabolic encephalopathy from sepsis and NEIL  -Get bedside swallow eval, when patient's delirium allows  -Patient is ill with baseline dementia, will attempt to start melatonin if able to take oral medications    Status post spinal cord stimulator for chronic back pain-no focal neuro signs  -Holding her chronic morphine and all sedating meds, though if patient appears to have significant opioid withdrawal, may need some opioids to combat symptoms    I discussed the patient's findings and my recommendations with nursing    Critical care time: Total of 60 minutes of critical care time between 1620 and 1900    Clementina Moss MD  03/06/21  19:38 EST

## 2021-03-06 NOTE — ED NOTES
2+ pitting edema Festus Magallanes, RN  03/06/21 0752    
Labs drawn after waste per IV site left ACFestus Yates, RN  03/06/21 1500    
O2 @ 2L/NC placed on pt for Sats 89-90%.     Festus Jennings, RN  03/06/21 3720    
Pt resting with eyes closed, pt adm to ICU ,IV site patent.     Festus Jennings, RN  03/06/21 1075    
Pt spouse states he gave pt Morphine at 10am and that pt was lethargic at that time.     Festus Jennings RN  03/06/21 2457    
Pt's lips dry and peeling.     Festus Jennings, RN  03/06/21 5683    
Str Cath with cloudy yellow urine returned.     Festus Jennings, DAMIEN  03/06/21 0233    
yes

## 2021-03-07 NOTE — PROGRESS NOTES
Pharmacokinetic Consult - Vancomycin Dosing    Sandra Mane is a 76 y.o. female who has been consulted for vancomycin dosing for sepsis (likely source is UTI).    Relevant clinical data and objective history reviewed:  Lab Results   Component Value Date/Time    CREATININE 1.49 (H) 03/07/2021 04:46 AM    CREATININE 2.13 (H) 03/06/2021 02:24 PM    CREATININE 1.65 (H) 01/04/2021 02:16 PM    CREATININE 1.43 (H) 06/24/2020 01:50 PM    CREATININE 1.39 (H) 06/01/2020 02:48 PM    CREATININE 1.5 03/09/2018 03:31 PM    BUN 34 (H) 03/07/2021 04:46 AM    BUN 43 (H) 03/06/2021 02:24 PM    BUN 29 (H) 01/04/2021 02:16 PM    BUN 18 06/24/2020 01:50 PM    BUN 14 06/01/2020 02:48 PM    BUN 22 (H) 03/09/2018 03:31 PM     Estimated Creatinine Clearance: 32.6 mL/min (A) (by C-G formula based on SCr of 1.49 mg/dL (H)).  Patient dehydrated upon admission. Fluids being given and serum creatine normalizing.    Lab Results   Component Value Date/Time    WBC 9.60 03/07/2021 04:46 AM    WBC 5.5 01/04/2021 02:16 PM    WBC 6.29 03/09/2018 03:31 PM    HGB 8.8 (L) 03/07/2021 04:46 AM    HGB 10.5 (L) 03/09/2018 03:31 PM    HCT 29.6 (L) 03/07/2021 04:46 AM    HCT 33.6 (L) 03/09/2018 03:31 PM    MCV 98.7 (H) 03/07/2021 04:46 AM    MCV 96.3 03/09/2018 03:31 PM     03/07/2021 04:46 AM     03/09/2018 03:31 PM     Temp Readings from Last 3 Encounters:   03/07/21 99.1 °F (37.3 °C) (Oral)   02/04/21 97.1 °F (36.2 °C) (Temporal)   01/07/21 97.3 °F (36.3 °C) (Temporal)     Weight: 74 kg (163 lb 2.3 oz)    Assessment/Plan  The patient will be started on vancomycin utilizing AUC dosing recommendations. Patient given Vancomycin 1500 mg IV X 1 on 3/6 at 2100. Will start Vancomycin 1 gram IV every 24 hours.     Serum creatinine will be ordered per policy.  Plan for trough as patient approaches steady state, prior to the 4th dose.  Due to infection severity, will target a trough of 15-20 mcg/ml. Pharmacy will continue to follow the patient’s culture  results and clinical progress daily.    Jose Dudley, PharmD

## 2021-03-07 NOTE — PLAN OF CARE
Goal Outcome Evaluation:  Plan of Care Reviewed With: patient  Progress: improving  Outcome Summary: Pt bp improved, more alert, oriented to person, place and situation; SR on monitor, LR at 150. Unable to void, intermit cath at midnight with 600 cc dark yellow urine, bladder scan at 4 am showing 312ml

## 2021-03-07 NOTE — PLAN OF CARE
Goal Outcome Evaluation:     Progress: improving  Outcome Summary: Pt had a fair day today was downgraded and is now med surg. She has c/o pain today to  her back.Spouse was here and she does not have an implanted stimulator in her back, it was removed year ago. Pain meds are routine Morphine 30 BID, Hydrocodone 5/325 given x1,with some relief hydrocodone 7.5/325 ordered prn for severe pain. Appetite good, pt is on a regular diet, passed bedside swallow eval.. IVF LR cont at 100. Urology was consulted and liang anchored, CT renal done as ordered and pt will be seen by urology in the am. Awake today, talkative, pleasantly confused at times. Lovenox to be started, scd cont. VSS b/p 109/57, resp 20 and on RA and o2 sats 91%. T 99.1. remains SR 60-70's., IV antibiotics cont for her UTI

## 2021-03-07 NOTE — NURSING NOTE
Patient to ICU at 1800.  After settling patient in, I had to assist with an urgent matter in another room so I was unable to call the family to complete admission navigator.  This was passed along to nightshift RN.

## 2021-03-07 NOTE — NURSING NOTE
"Discharge Planning Assessment  [facility]     Patient Name: Sandra Mane  MRN: [id number]  Today's Date: 3/7/2021    Admit Date: 3/6/2021    Discharge Needs Assessment     Row Name 03/07/21 1328       Living Environment    Lives With  spouse    Name(s) of Who Lives With Patient   Juni    Current Living Arrangements  home/apartment/condo    Primary Care Provided by  self    Provides Primary Care For  no one, unable/limited ability to care for self    Family Caregiver if Needed  spouse    Family Caregiver Names  Juni    Quality of Family Relationships  helpful;involved;supportive    Able to Return to Prior Arrangements  yes       Resource/Environmental Concerns    Resource/Environmental Concerns  none       Transition Planning    Patient/Family Anticipates Transition to  home with family    Transportation Anticipated  family or friend will provide       Discharge Needs Assessment    Readmission Within the Last 30 Days  no previous admission in last 30 days    Equipment Currently Used at Home  walker, rolling;wheelchair    Concerns to be Addressed  discharge planning    Anticipated Changes Related to Illness  none    Equipment Needed After Discharge  none        Discharge Plan     Row Name 03/07/21 3486       Plan    Plan  Home when stable    Provided Post Acute Provider List?  N/A    N/A Provider List Comment  No needs at this time    Patient/Family in Agreement with Plan  yes    Plan Comments  Spoke with Mrs Mane and her  (with permission) at bedside.  They live [facility] [facility] in [place].  [employer] verified.  She ambulates with a rolling walker but uses a wheelchair for distances.  She is independent with her ADL\"s but her  does all of the driving.  [facility] [place].  There are no issues with paying for her prescriptions.  She does not have an advanced directive on file but they would like to talk with the  about such.  will do a referral to [facility] the .  Plan " is home when stable.  Will continue to follow        Continued Care and Services - Admitted Since 3/6/2021    Coordination has not been started for this encounter.         Demographic Summary     Row Name 03/07/21 1327       General Information    Admission Type  inpatient    Arrived From  home    Referral Source  admission list    Reason for Consult  discharge planning    Preferred Language  English     Used During This Interaction  no       Contact Information    Permission Granted to Share Info With          Functional Status    No documentation.       Psychosocial    No documentation.       Abuse/Neglect    No documentation.       Legal    No documentation.       Substance Abuse    No documentation.       Patient Forms    No documentation.           Keily Ambriz RN

## 2021-03-08 PROBLEM — N39.0 URINARY TRACT INFECTION IN FEMALE: Status: ACTIVE | Noted: 2021-01-01

## 2021-03-08 PROBLEM — A41.9 SEPSIS (HCC): Status: RESOLVED | Noted: 2021-01-01 | Resolved: 2021-01-01

## 2021-03-08 NOTE — PROGRESS NOTES
Adult Nutrition  Assessment/PES    Patient Name:  Sandra Mane  YOB: 1945  MRN: [id number]  Admit Date:  3/6/2021    Assessment Date:  3/8/2021    Comments: Good po intake.   Will cont to follow and monitor.     Reason for Assessment     Row Name 03/08/21 1332          Reason for Assessment    Reason For Assessment  identified at risk by screening criteria     Diagnosis  infection/sepsis Sepsis, UTI, NEIL, Change in MS hx Alzheimer, spinal cord stimulator         Nutrition/Diet History     Row Name 03/08/21 1333          Nutrition/Diet History    Typical Food/Fluid Intake  Spoke w pt at bedside, Didn't like eggs/sausage this am. Took pt meal prefs for lunch and offered snack. Reports appetite good athome. Denies an signficant wt loss 's.         Anthropometrics     Row Name 03/08/21 1334          Anthropometrics    Weight  -- 163.1#        Body Mass Index (BMI)    BMI Assessment  BMI 25-29.9: overweight         Labs/Tests/Procedures/Meds     Row Name 03/08/21 1334          Labs/Procedures/Meds    Lab Results Reviewed  reviewed     Lab Results Comments  BUn 34 H, creat 1.3 H        Diagnostic Tests/Procedures    Diagnostic Test/Procedure Reviewed  reviewed        Medications    Pertinent Medications Reviewed  reviewed     Pertinent Medications Comments  miralax, enema           Estimated/Assessed Needs     Row Name 03/08/21 1331          Estimated/Assessed Needs    Additional Documentation  Calorie Requirements (Group);Fluid Requirements (Group);Protein Requirements (Group)        Calorie Requirements    Estimated Calorie Need Method  [place] Je[place]     Estimated Calorie Requirement Comment  1471 kcal ( mifflin 1.2 )        Protein Requirements    Est Protein Requirement Amount (gms/kg)  1.0 gm protein        Fluid Requirements    Estimated Fluid Requirement Method  RDA Method 1471 ml         Nutrition Prescription Ordered     Row Name 03/08/21 1336          Nutrition Prescription PO    Current  PO Diet  Regular         Evaluation of Received Nutrient/Fluid Intake     Row Name 03/08/21 1336          Fluid Intake Evaluation    Oral Fluid (mL)  1200 82%        PO Evaluation    Number of Meals  2     % PO Intake  88%               Problem/Interventions:  Problem 1     Row Name 03/08/21 1337          Nutrition Diagnoses Problem 1    Problem 1  Nutrition Appropriate for Condition at this Time               Intervention Goal     Row Name 03/08/21 1337          Intervention Goal    General  Meet nutritional needs for age/condition     PO  Maintain intake;PO intake (%)     PO Intake %  75 % or greater     Weight  Maintain weight         Nutrition Intervention     Row Name 03/08/21 1337          Nutrition Intervention    RD/Tech Action  Follow Tx progress           Education/Evaluation     Row Name 03/08/21 1337          Education    Education  No discharge needs identified at this time        Monitor/Evaluation    Monitor  Per protocol;I&O;PO intake;Pertinent labs;Weight           Electronically signed by:  Neda Peters RD  03/08/21 13:37 EST

## 2021-03-08 NOTE — PLAN OF CARE
Goal Outcome Evaluation:  Plan of Care Reviewed With: patient     Outcome Summary: Pt moved from ICU to [facility], tolerated travel well. Pt requesting pain med x1 overnight, see emar. see flowsheets charting.

## 2021-03-08 NOTE — NURSING NOTE
Continued Stay Note  GUILHERME Barahona     Patient Name: Sandra Mane  MRN: [id number]  Today's Date: 3/8/2021    Admit Date: 3/6/2021    Discharge Plan     Row Name 03/08/21 1550       Plan    Plan  home with [facility]    Plan Comments  rec'd call from Elan QUEZADA stating patients  has concerns r/t dc. Spoke with patient and  at bedside. Mr Mane feels patient is still very weak and he has concerns about her returning home. Explained that medically she is stable to for dc. Offered HH to follow and both patient and  are agreeable to HH. Mr Mane states there are 4 steps to get from the garage into the home and he is concerned about her ability to get up the steps. He states there is no family available to meet him at the house to assist getting pateint inside the home. Dr Horowitz updated and HH order rec'd.Spoke with Kamilah/[facility], referral  given - informed patient will dc today. Spoke with Heriberto/[facility] who can provide assistance to get patient into the home. Elan QUEZADA updated. Spoke with Mr Mane and he is appreciative of assistance. CM or RN will notify EMS when patient is leaving the facility, they will meet patient at home. CM will continue to follow.        Discharge Codes    No documentation.       Expected Discharge Date and Time     Expected Discharge Date Expected Discharge Time    Mar 8, 2021             Sarmad Whitehead, DAMIEN

## 2021-03-08 NOTE — CONSULTS
FIRST UROLOGY CONSULT      Patient Identification:  NAME:  Sandra Mane  Age:  76 y.o.   Sex:  female   :  1945   MRN:  [id number]       Chief complaint: Feeling better no back pain    History of present illness: 76-year-old female gives no prior  history or GYN history presented with a prior history of chronic back pain with a spinal cord stimulator depression anxiety noted by her has become progressive lethargy difficulty getting her up using the bathroom assisting her with the hypotension urinary tract infection and dehydration altered mental status prior to this admission patient walks normally.    Placed on broad-spectrum antibiotics Vidal catheter was inserted the amount of pain was not noted CT scan was ordered yesterday showing no signs of any upper urinary tract obstruction and Vidal catheter in place no stones renal masses      Past medical history:  Past Medical History:   Diagnosis Date   • Anxiety    • Arthritis    • Back pain     SEES DR. BERRY FOR THIS   • Depression    • Scoliosis        Past surgical history:  Past Surgical History:   Procedure Laterality Date   • COLONOSCOPY     • JOINT REPLACEMENT     • REPLACEMENT TOTAL KNEE Right    • SPINAL CORD STIMULATOR IMPLANT     • SPINAL CORD STIMULATOR IMPLANT         Allergies:  Patient has no known allergies.    Home medications:  Medications Prior to Admission   Medication Sig Dispense Refill Last Dose   • clonazePAM (KlonoPIN) 0.5 MG tablet TAKE TWO TABLETS BY MOUTH ONCE NIGHTLY AS NEEDED 60 tablet 5 3/5/2021 at 2200   • furosemide (LASIX) 20 MG tablet TAKE TWO TABLETS BY MOUTH DAILY 60 tablet 3 3/5/2021 at 1000   • gabapentin (NEURONTIN) 400 MG capsule Take 400 mg by mouth every 4 (four) hours.   3/6/2021 at 1000   • memantine (NAMENDA) 10 MG tablet TAKE ONE TABLET BY MOUTH DAILY 90 tablet 1 3/5/2021 at  2300   • potassium chloride 10 MEQ CR tablet TAKE TWO TABLETS BY MOUTH DAILY 60 tablet 3 3/5/2021 at 1000   • FLUoxetine  (PROzac) 20 MG tablet Take 2 tablets by mouth Daily. 60 tablet 5 3/5/2021 at 1000   • fluticasone (FLONASE) 50 MCG/ACT nasal spray 2 sprays into the nostril(s) as directed by provider Daily.   3/5/2021 at 1000   • Morphine (MSIR) 30 MG tablet 60 mg 3 (Three) Times a Day. Pt takes 2 po three times a day   3/6/2021 at 1000   • omeprazole (priLOSEC) 20 MG capsule Take 20 mg by mouth.          Hospital medications:  cefepime, 2 g, Intravenous, Q24H  FLUoxetine, 20 mg, Oral, Daily  fluticasone, 2 spray, Nasal, Daily  gabapentin, 300 mg, Oral, Q8H  memantine, 10 mg, Oral, Daily  Morphine, 30 mg, Oral, Q12H  sodium chloride, 10 mL, Intravenous, Q12H  vancomycin, 20 mg/kg, Intravenous, Once  vancomycin, 1,000 mg, Intravenous, Q24H      lactated ringers, 100 mL/hr, Last Rate: 100 mL/hr (21 0426)  Pharmacy to dose vancomycin,       •  acetaminophen **OR** acetaminophen  •  albuterol  •  bisacodyl  •  clonazePAM  •  HYDROcodone-acetaminophen **OR** HYDROcodone-acetaminophen  •  ondansetron **OR** ondansetron  •  Pharmacy to dose vancomycin  •  [COMPLETED] Insert peripheral IV **AND** sodium chloride  •  sodium chloride  •  sodium chloride    Family history:  Family History   Problem Relation Age of Onset   • Cancer Father    • COPD Father    • Breast cancer Paternal Aunt        Social history:  Social History     Tobacco Use   • Smoking status: Never Smoker   • Smokeless tobacco: Never Used   Substance Use Topics   • Alcohol use: No   • Drug use: No       Review of systems:    Negative 12-system ROS except for the following: No hematuria      Objective:  TMax 24 hours:   Temp (24hrs), Av.3 °F (36.8 °C), Min:98 °F (36.7 °C), Max:99.1 °F (37.3 °C)      Vitals Ranges:   Temp:  [98 °F (36.7 °C)-99.1 °F (37.3 °C)] 98.1 °F (36.7 °C)  Heart Rate:  [55-75] 56  Resp:  [18-20] 18  BP: (105-152)/(57-95) 134/68    Intake/Output Last 3 shifts:  I/O last 3 completed shifts:  In: 6186.3 [P.O.:1200; I.V.:4236.3; IV  Piggyback:750]  Out: 3000 [Urine:3000]     Physical Exam: Nurse in attendance       General Appearance:    Alert, cooperative, in no acute distress conversant much more appropriate than on admission   Head:    Normocephalic, without obvious abnormality, atraumatic   Eyes:           conjunctivae and corneas clear   Ears:    Normal external inspection   Throat:   No oral lesions, oral mucosa moist   Neck:   Supple, no LAD, trachea midline   Back:     No CVA tenderness   Lungs:     Respirations unlabored, symmetric excursion    Heart:    RRR, intact peripheral pulses   Abdomen:    Soft no masses   :   Catheter is in place urine is clear pelvic examination reveals no pelvic organ prolapse pelvis is nontender there is firm hard stool in rectal vault   Extremities:   No edema, no deformity   Skin:   No bleeding, bruising or rashes   Neuro/Psych:   Orientation intact, mood/affect pleasant,        Results review:   I reviewed the patient's new clinical results.    Data review:  Lab Results (last 24 hours)     Procedure Component Value Units Date/Time    Phosphorus [[id number]]  (Abnormal) Collected: 03/08/21 0707    Specimen: Blood Updated: 03/08/21 0806     Phosphorus 2.3 mg/dL     Comprehensive Metabolic Panel [[id number]]  (Abnormal) Collected: 03/08/21 0707    Specimen: Blood Updated: 03/08/21 0752     Glucose 94 mg/dL      BUN 23 mg/dL      Creatinine 1.35 mg/dL      Sodium 139 mmol/L      Potassium 4.0 mmol/L      Chloride 106 mmol/L      CO2 26.0 mmol/L      Calcium 8.7 mg/dL      Total Protein 6.3 g/dL      Albumin 2.90 g/dL      ALT (SGPT) 23 U/L      AST (SGOT) 48 U/L      Alkaline Phosphatase 75 U/L      Total Bilirubin 0.5 mg/dL      eGFR Non African Amer 38 mL/min/1.73      Globulin 3.4 gm/dL      A/G Ratio 0.9 g/dL      BUN/Creatinine Ratio 17.0     Anion Gap 7.0 mmol/L     Narrative:      GFR Normal >60  Chronic Kidney Disease <60  Kidney Failure <15      Magnesium [[id number]]  (Normal) Collected: 03/08/21  0707    Specimen: Blood Updated: 03/08/21 0752     Magnesium 1.7 mg/dL     CBC & Differential [[id number]]  (Abnormal) Collected: 03/08/21 0707    Specimen: Blood Updated: 03/08/21 0718    Narrative:      The following orders were created for panel order CBC & Differential.  Procedure                               Abnormality         Status                     ---------                               -----------         ------                     CBC Auto Differential[[id number]]        Abnormal            Final result                 Please view results for these tests on the individual orders.    CBC Auto Differential [[id number]]  (Abnormal) Collected: 03/08/21 0707    Specimen: Blood Updated: 03/08/21 0718     WBC 6.61 10*3/mm3      RBC 3.08 10*6/mm3      Hemoglobin 9.2 g/dL      Hematocrit 30.1 %      MCV 97.7 fL      MCH 29.9 pg      MCHC 30.6 g/dL      RDW 14.5 %      RDW-SD 52.5 fl      MPV 10.1 fL      Platelets 151 10*3/mm3      Neutrophil % 70.7 %      Lymphocyte % 17.5 %      Monocyte % 8.5 %      Eosinophil % 2.7 %      Basophil % 0.3 %      Immature Grans % 0.3 %      Neutrophils, Absolute 4.67 10*3/mm3      Lymphocytes, Absolute 1.16 10*3/mm3      Monocytes, Absolute 0.56 10*3/mm3      Eosinophils, Absolute 0.18 10*3/mm3      Basophils, Absolute 0.02 10*3/mm3      Immature Grans, Absolute 0.02 10*3/mm3      nRBC 0.0 /100 WBC     Blood Culture - Blood, Arm, Left [[id number]] Collected: 03/06/21 1619    Specimen: Blood from Arm, Left Updated: 03/07/21 1645     Blood Culture No growth at 24 hours    Blood Culture - Blood, Hand, Right [[id number]] Collected: 03/06/21 1638    Specimen: Blood from Hand, Right Updated: 03/07/21 1645     Blood Culture No growth at 24 hours           Imaging:  Imaging Results (Last 24 Hours)     Procedure Component Value Units Date/Time    CT Abdomen Pelvis Stone Protocol [[id number]] Collected: 03/07/21 1822     Updated: 03/07/21 1824    Narrative:      CT Abdomen Pelvis  WO    INDICATION:   Bilateral flank pain, hematuria, confusion, ICA.    TECHNIQUE:   CT of the abdomen and pelvis without IV contrast. Coronal and sagittal reconstructions were obtained.  Radiation dose reduction techniques included automated exposure control or exposure modulation based on body size. Radiation audit for CT and nuclear  cardiology exams in the last 12 months: 1.     COMPARISON:   None available.    FINDINGS:    Evaluation lung bases demonstrates consolidation at the left base, possibly atelectasis though infectious/inflammatory infiltrate could cause an identical appearance. There is likely trace left pleural effusion. Some ill-defined groundglass opacities in  the left upper lobe are also noted.    No destructive osseous lesion. Degenerative changes of the spine and dextroscoliosis.    Evaluation of the solid viscera compromised by lack of IV contrast. Artifact from patient's arms at her side further limits evaluation of the abdomen. Allowing for this normal noncontrast appearance of liver, gallbladder, pancreas, spleen and both  adrenal glands.    The kidneys are symmetric in size. Evaluation for solid renal lesion is largely precluded by lack of IV contrast. No hydronephrosis. No renal or ureteral calculus. Urinary bladder is decompressed by Vidal catheter. Intraluminal air in the urinary bladder  likely relates to iatrogenic causes.    No evidence of bowel obstruction. Hardened stool within the colon especially at the level the rectum. There is nonspecific free fluid layering in the right pelvis, etiology uncertain. There is no large adnexal mass identified. Favor the uterus to be  present.    Tortuous aorta appears of normal size. No convincing lymphadenopathy in the abdomen or pelvis.        Impression:        1.  Consolidation at the left base and trace left pleural effusion. Possibly pneumonia with superimposed atelectasis.  2.   Exceedingly difficult evaluation of the abdomen and pelvis due  to combination of factors including motion, Quantum model, and streak artifact. Allowing for this no certain focal acute abdominal or pelvic abnormality seen. There is a small amount  free fluid layering within the pelvis on the right, uncertain etiology.  3.  Hardened stool within the colon, possibly early fecal impaction.          Signer Name: CALOS COLE MD   Signed: 3/7/2021 6:22 PM   Workstation Name: [id number]    [facility]             Assessment:       Sepsis (CMS/HCC)    Progressive lethargy urinary tract infection hypertension possibility of sepsis already on broad-spectrum antibiotics culture so far negative  Fecal impaction  Plan:     Urine culture pending continue current antibiotics Vidal catheter can removed anytime for voiding trial with and out cath as necessary orders have been written patient does have firm stool in the vault and will need possible disimpaction I discussed with the nurse and patient    Krishan Sebastian MD  03/08/21  08:40 EST

## 2021-03-08 NOTE — PROGRESS NOTES
"SERVICE: [facility] HOSPITALIST    CHIEF COMPLAINT: Not having her home morphine    SUBJECTIVE:    Patient seen on rounds this morning, did not require any pressors overnight.  Had a remarkable recovery in mental status.  Was alert and oriented x4.    Had detailed questions about why her home medications were held, but was understanding and accepting of the explanation of her altered mental status and altered clearance.    Patient no longer has a pain pump, has been stable on this dose of medication for a long time.           Patient states that she felt like she had a UTI when she was last seen by her primary care, with burning, pressure, increased urinary frequency, but states \"I do not know if anything came of that, maybe the nurse forgot to mention this to the doctor\".  Patient gave the indication that this visit to the primary care was very recent, and that she has had progressively worsening symptoms since, but review of records indicate that the patient last seen her primary care doctor on 2/4.    She was found to have some urinary retention, with a bladder scan showing residual volumes.  With Vidal placed.    Review of systems was positive for her chronic back and neck pain, nausea and vomiting the night before admission   negative for shortness of breath, cough, chest pain, abdominal pain, sore throat or headache                                            OBJECTIVE:    /57 (BP Location: Left arm, Patient Position: Lying)   Pulse 63   Temp 99.1 °F (37.3 °C) (Axillary)   Resp 20   Ht 162.6 cm (64\")   Wt 74 kg (163 lb 2.3 oz)   SpO2 91%   BMI 28.00 kg/m²     MEDS/LABS REVIEWED AND ORDERED    cefepime, 2 g, Intravenous, Q24H  FLUoxetine, 20 mg, Oral, Daily  fluticasone, 2 spray, Nasal, Daily  gabapentin, 300 mg, Oral, Q8H  memantine, 10 mg, Oral, Daily  Morphine, 30 mg, Oral, Q12H  sodium chloride, 10 mL, Intravenous, Q12H  vancomycin, 20 mg/kg, Intravenous, Once  vancomycin, " 1,000 mg, Intravenous, Q24H        Physical Exam  Vitals and nursing note reviewed.   Constitutional:       General: She is not in acute distress.     Appearance: She is not toxic-appearing or diaphoretic.   HENT:      Nose: Nose normal.      Mouth/Throat:      Mouth: Mucous membranes are moist.   Eyes:      Extraocular Movements: Extraocular movements intact.      Conjunctiva/sclera: Conjunctivae normal.      Pupils: Pupils are equal, round, and reactive to light.   Cardiovascular:      Rate and Rhythm: Normal rate and regular rhythm.      Heart sounds: Normal heart sounds. No murmur.   Pulmonary:      Effort: No respiratory distress.      Breath sounds: Normal breath sounds. No wheezing, rhonchi or rales.   Abdominal:      General: Bowel sounds are normal. There is no distension.      Palpations: Abdomen is soft.      Tenderness: There is no abdominal tenderness. There is no guarding.   Musculoskeletal:      Right lower leg: No edema.      Left lower leg: No edema.   Skin:     General: Skin is warm and dry.   Neurological:      General: No focal deficit present.      Mental Status: She is alert and oriented to person, place, and time.      Cranial Nerves: No cranial nerve deficit.   Psychiatric:         Mood and Affect: Mood normal.         Behavior: Behavior normal.      Comments: Pleasant, cheerful and gregarious mood and affect         LAB/DIAGNOSTICS:    Lab Results (last 24 hours)     Procedure Component Value Units Date/Time    Blood Culture - Blood, Arm, Left [[id number]] Collected: 03/06/21 1619    Specimen: Blood from Arm, Left Updated: 03/07/21 1645     Blood Culture No growth at 24 hours    Blood Culture - Blood, Hand, Right [[id number]] Collected: 03/06/21 1638    Specimen: Blood from Hand, Right Updated: 03/07/21 1645     Blood Culture No growth at 24 hours    Urine Culture - Urine, Urine, Catheter [[id number]] Collected: 03/06/21 1436    Specimen: Urine, Catheter Updated: 03/07/21 1031    Procalcitonin  [[id number]]  (Abnormal) Collected: 03/07/21 0446    Specimen: Blood Updated: 03/07/21 0522     Procalcitonin 6.50 ng/mL     Narrative:      Results may be falsely decreased if patient taking Biotin.     Comprehensive Metabolic Panel [[id number]]  (Abnormal) Collected: 03/07/21 0446    Specimen: Blood Updated: 03/07/21 0517     Glucose 89 mg/dL      BUN 34 mg/dL      Creatinine 1.49 mg/dL      Sodium 144 mmol/L      Potassium 4.3 mmol/L      Chloride 111 mmol/L      CO2 25.3 mmol/L      Calcium 8.0 mg/dL      Total Protein 5.8 g/dL      Albumin 2.70 g/dL      ALT (SGPT) 20 U/L      AST (SGOT) 47 U/L      Alkaline Phosphatase 70 U/L      Total Bilirubin 0.3 mg/dL      eGFR Non African Amer 34 mL/min/1.73      Globulin 3.1 gm/dL      A/G Ratio 0.9 g/dL      BUN/Creatinine Ratio 22.8     Anion Gap 7.7 mmol/L     Narrative:      GFR Normal >60  Chronic Kidney Disease <60  Kidney Failure <15      Phosphorus [[id number]]  (Normal) Collected: 03/07/21 0446    Specimen: Blood Updated: 03/07/21 0517     Phosphorus 3.4 mg/dL     Magnesium [[id number]]  (Normal) Collected: 03/07/21 0446    Specimen: Blood Updated: 03/07/21 0517     Magnesium 1.8 mg/dL     Lactic Acid, Plasma [[id number]]  (Normal) Collected: 03/07/21 0447    Specimen: Blood Updated: 03/07/21 0511     Lactate 0.6 mmol/L     CBC & Differential [[id number]]  (Abnormal) Collected: 03/07/21 0446    Specimen: Blood Updated: 03/07/21 0456    Narrative:      The following orders were created for panel order CBC & Differential.  Procedure                               Abnormality         Status                     ---------                               -----------         ------                     CBC Auto Differential[[id number]]        Abnormal            Final result                 Please view results for these tests on the individual orders.    CBC Auto Differential [[id number]]  (Abnormal) Collected: 03/07/21 0446    Specimen: Blood Updated: 03/07/21 0456     WBC 9.60  10*3/mm3      RBC 3.00 10*6/mm3      Hemoglobin 8.8 g/dL      Hematocrit 29.6 %      MCV 98.7 fL      MCH 29.3 pg      MCHC 29.7 g/dL      RDW 14.6 %      RDW-SD 53.1 fl      MPV 9.7 fL      Platelets 150 10*3/mm3      Neutrophil % 75.7 %      Lymphocyte % 14.0 %      Monocyte % 7.8 %      Eosinophil % 1.9 %      Basophil % 0.2 %      Immature Grans % 0.4 %      Neutrophils, Absolute 7.27 10*3/mm3      Lymphocytes, Absolute 1.34 10*3/mm3      Monocytes, Absolute 0.75 10*3/mm3      Eosinophils, Absolute 0.18 10*3/mm3      Basophils, Absolute 0.02 10*3/mm3      Immature Grans, Absolute 0.04 10*3/mm3      nRBC 0.0 /100 WBC             ASSESSMENT/PLAN:    Severe sepsis with and organ dysfunction most likely from UTI  -Did not require pressors overnight, patient mental status remarkably improved this morning, but pro-Taco did go from 3-6, downgraded to MedSurg  - Appears to be complicated with urinary obstruction  -Vidal placed, urology consulted  -Cultures pending, keep on cefepime and Vanco for now, de-escalate as indicated    NEIL  -Creatinine rapidly improved from 2.13-1.49, 1.  4-1 5 5 appears to be approximately patient's baseline  -We will continue to monitor    Chronic opioid use  -Patient gets 60 mg long acting oral morphine twice a day, doses verified by [facility]  -Have ordered scheduled 30 mg every 12, and have added Norco 5/7.5 for moderate/severe breakthrough pain which if she took every 4 hours would be equivalent to her reduced dose of scheduled morphine, with a 50% reduction for incomplete cross-reactivity    Altered mental status due to metabolic encephalopathy from sepsis and NEIL  -Markably improved, but with some expected waxing and waning

## 2021-03-09 NOTE — OUTREACH NOTE
Prep Survey      Responses   [facility] facility patient discharged from?  [place]   Is LACE score < 7 ?  No   Emergency Room discharge w/ pulse ox?  No   Eligibility  TCM   Date of Admission  03/06/21   Date of Discharge  03/08/21   Discharge Disposition  Home-Health Care [facility]   Discharge diagnosis  Sepsis Pneumonia of left lower lobe    Does the patient have one of the following disease processes/diagnoses(primary or secondary)?  Sepsis   Does the patient have Home health ordered?  Yes   What is the Home health agency?   home with [facility]   Is there a DME ordered?  No   Prep survey completed?  Yes          Courtney Jeter RN

## 2021-03-09 NOTE — OUTREACH NOTE
Call Center TCM Note      Responses   [facility] patient discharged from?  [place]   Does the patient have one of the following disease processes/diagnoses(primary or secondary)?  Sepsis   TCM attempt successful?  Yes   Discharge diagnosis  Sepsis Pneumonia of left lower lobe    Is patient permission given to speak with other caregiver?  Yes   List who call center can speak with     Person spoke with today (if not patient) and relationship     Meds reviewed with patient/caregiver?  Yes   Is the patient having any side effects they believe may be caused by any medication additions or changes?  No   Does the patient have all medications related to this admission filled (includes all antibiotics, inhalers, nebulizers,steroids,etc.)  Yes   Is the patient taking all medications as directed (includes completed medication regime)?  Yes   Does the patient have a primary care provider?   Yes   Comments regarding PCP  Niharika Blanton MD   TCM FWP with PCP is 03/18/2021   Does the patient have an appointment with their PCP within 7 days of discharge?  Greater than 7 days   What is preventing the patient from scheduling follow up appointments within 7 days of discharge?  Earlier appointment not available   Has the patient kept scheduled appointments due by today?  N/A   Home health comments  Pt/ declined    Psychosocial issues?  No   Did the patient receive a copy of their discharge instructions?  Yes   Nursing interventions  Reviewed instructions with patient   What is the patient's perception of their health status since discharge?  Improving   Nursing interventions  Nurse provided patient education   Is the patient/caregiver able to teach back Sepsis?  S - Shivering,fever or very cold, E - Extreme pain or generalized discomfort (worst ever,especially abdomen), P - Pale or discolored skin, S - Sleepy, difficult to arouse,confused, I -   I feel like I might die-a feeling of hopelessness, S -  "Short of breath   Is patient/caregiver able to teach back steps to recovery at home?  Set small, achievable goals for return to baseline health, Record milestones and struggles in a journal, Exercise as tolerated, Make a list of questions for PCP appoinment, Learn about sepsis(sepsis.org), Rest and regain strength, Talk about feelings with family/friends, Eat a balanced diet   Is the patient/caregiver able to teach back signs and symptoms of worsening condition:  Fever, Altered mental status(confusion/coma), Edema, Hyperthermia, Rapid heart rate (>90), High blood glucose without diabetes, Shortness of breath/rapid respiratory rate   If the patient is a current smoker, are they able to teach back resources for cessation?  Not a smoker   Is the patient/caregiver able to teach back the hierarchy of who to call/visit for symptoms/problems? PCP, Specialist, Home health nurse, Urgent Care, ED, 911  Yes   TCM call completed?  Yes   Wrap up additional comments  Pt was in bath.  states pt much better, \"back to her regular self\". New meds in place. No questions at this time. TCM FWP with PCP is 03/18/2021          Keily López MA    3/9/2021, 14:55 EST      "

## 2021-03-09 NOTE — NURSING NOTE
Case Management Discharge Note      Final Note: Discharged home with [facility].    Provided Post Acute Provider List?: N/A  N/A Provider List Comment: No needs at this time    Selected Continued Care - Discharged on 3/8/2021 Admission date: 3/6/2021 - Discharge disposition: [place] [facility]    Destination    No services have been selected for the patient.              Durable Medical Equipment    No services have been selected for the patient.              Dialysis/Infusion    No services have been selected for the patient.              Home Medical Care Coordination complete    Service Provider Selected Services Address Phone Fax Patient Preferred    [facility] [place]  [facility] [address] [address] [phone] --          Therapy    No services have been selected for the patient.              Community Resources    No services have been selected for the patient.                       Final Discharge Disposition Code: 06 - home with home health care

## 2021-03-15 PROBLEM — F32.A DEPRESSION: Status: ACTIVE | Noted: 2021-01-01

## 2021-03-15 PROBLEM — Z87.11 HISTORY OF BLEEDING ULCERS: Status: ACTIVE | Noted: 2021-01-01

## 2021-03-15 NOTE — PROGRESS NOTES
Subjective:     Patient ID: Sandra Mane is a 76 y.o. female.    Chief Complaint:  Follow-up DJD left shoulder, subacromial bursitis left shoulder  Follow-up right hip pain  History of Present Illness  Sandra Mane returns to clinic for follow-up of her right hip.  Maximal tenderness present the lateral posterior lateral aspect of the hip worse with lying on the right side of the hip, transitional activity such as seated standing attempting to walk.  She received fluoroscopy guided injection last year with significant symptom relief until most recently.  She is recently been hospitalized which exacerbated her pain at the shoulder and the hip.  She received corticosteroid injection subacromial bursa of the left shoulder 1/29/2021 with significant symptom relief until the last 1 week after discharge from hospital.  Denies pain radiating to the groin.  Denies other concerns present this time.     Social History     Occupational History   • Not on file   Tobacco Use   • Smoking status: Never Smoker   • Smokeless tobacco: Never Used   Vaping Use   • Vaping Use: Never used   Substance and Sexual Activity   • Alcohol use: No   • Drug use: Yes     Types: Morphine   • Sexual activity: Defer      Past Medical History:   Diagnosis Date   • Anxiety    • Arthritis    • Back pain     SEES DR. BERRY FOR THIS   • Depression    • Scoliosis      Past Surgical History:   Procedure Laterality Date   • COLONOSCOPY     • JOINT REPLACEMENT     • REPLACEMENT TOTAL KNEE Right    • SPINAL CORD STIMULATOR IMPLANT  2011   • SPINAL CORD STIMULATOR IMPLANT         Family History   Problem Relation Age of Onset   • Cancer Father    • COPD Father    • Breast cancer Paternal Aunt          Review of Systems   Constitutional: Negative for appetite change, chills, diaphoresis, fever and unexpected weight change.   HENT: Negative for hearing loss, nosebleeds, sore throat and tinnitus.    Eyes: Negative for pain and visual disturbance.   Respiratory:  "Negative for cough, shortness of breath and wheezing.    Cardiovascular: Negative for chest pain and palpitations.   Gastrointestinal: Negative for abdominal pain, diarrhea, nausea and vomiting.   Endocrine: Negative for cold intolerance, heat intolerance and polydipsia.   Genitourinary: Negative for difficulty urinating, dysuria and hematuria.   Musculoskeletal: Positive for arthralgias and myalgias. Negative for joint swelling.   Skin: Negative for rash and wound.   Allergic/Immunologic: Negative for environmental allergies.   Neurological: Negative for dizziness, syncope and numbness.   Hematological: Does not bruise/bleed easily.   Psychiatric/Behavioral: Negative for dysphoric mood and sleep disturbance. The patient is not nervous/anxious.            Objective:  Physical Exam    General: No acute distress.  Eyes: conjunctiva clear; pupils equally round and reactive  ENT: external ears and nose atraumatic; oropharynx clear  CV: no peripheral edema  Resp: normal respiratory effort  Skin: no rashes or wounds; normal turgor  Psych: mood and affect appropriate; recent and remote memory intact    Vitals:    03/15/21 1457   Weight: 73.9 kg (163 lb)   Height: 162.6 cm (64\")         03/15/21  1457   Weight: 73.9 kg (163 lb)     Body mass index is 27.98 kg/m².      Right Hip Exam     Tenderness   The patient is experiencing tenderness in the greater trochanter, lateral and posterior.    Range of Motion   Abduction: 45 (passive )   Adduction: 25 (passive )   Extension: 0   External rotation: 50   Internal rotation: 25     Muscle Strength   Abduction: 4/5   Adduction: 4/5   Flexion: 4/5     Tests   MIRNA: negative  Fadir:  Negative FADIR test    Other   Erythema: absent  Sensation: normal  Pulse: present    Comments:  Negative logroll exam  negative stinchfield exam        Left Shoulder Exam     Tenderness   The patient is experiencing tenderness in the acromion.    Range of Motion   External rotation: 50   Forward " flexion: 160   Internal rotation 0 degrees: L5     Muscle Strength   Internal rotation: 4/5   External rotation: 4/5   Supraspinatus: 4/5   Subscapularis: 4/5   Biceps: 4/5     Tests   Cross arm: negative  Drop arm: negative    Other   Erythema: absent  Sensation: normal  Pulse: present     Comments:  Maximal tenderness anterior joint line  positive empty can  negative Alexander's  negative Speed's  negative bear hug exam             Imaging:  Right Hip X-Ray  Indication: Pain  AP and Frog Leg views    Findings:  No fracture  No bony lesion  Normal soft tissues  Normal joint spaces  Mild hip arthropathy, moderate osteoarthritic changes left hip  No prior studies were available for comparison.    Assessment:        1. Bilateral hip pain    2. Right hip pain    3. Primary osteoarthritis, left shoulder           Plan:  1.  Discussed plan of care with patient.  Wish to proceed glenohumeral corticosteroid injection left shoulder.  2.  Also wish to proceed with fluoroscopy guided injection to the right hip considering she is received significant symptom relief from injection she received 1 year ago we will proceed with fluoroscopy injection again to the hip.  She does have follow-up previously scheduled in the month to reevaluate for the knee and shoulder.  Plan to follow-up with her then.  All questions answered.    Large Joint Arthrocentesis: L glenohumeral  Date/Time: 3/15/2021 3:31 PM  Consent given by: patient  Site marked: site marked  Timeout: Immediately prior to procedure a time out was called to verify the correct patient, procedure, equipment, support staff and site/side marked as required   Supporting Documentation  Indications: pain   Procedure Details  Location: shoulder - L glenohumeral  Preparation: Patient was prepped and draped in the usual sterile fashion  Needle size: 22 G  Approach: anterior  Medications administered: 80 mg triamcinolone acetonide 40 MG/ML; 4 mL lidocaine PF 1% 1 %  Patient tolerance:  patient tolerated the procedure well with no immediate complications          Orders:  Orders Placed This Encounter   Procedures   • Large Joint Arthrocentesis   • XR Hip With or Without Pelvis 2 - 3 View Right   • FL Guide For Pain Meds Injection Joint       Medications:  No orders of the defined types were placed in this encounter.      Followup:  No follow-ups on file.    Diagnoses and all orders for this visit:    1. Bilateral hip pain (Primary)  -     XR Hip With or Without Pelvis 2 - 3 View Right    2. Right hip pain  -     FL Guide For Pain Meds Injection Joint; Future    3. Primary osteoarthritis, left shoulder    Other orders  -     Large Joint Arthrocentesis      Dictated utilizing Dragon dictation

## 2021-03-16 NOTE — OUTREACH NOTE
Sepsis Week 2 Survey      Responses   [facility] patient discharged from?  [place]   Does the patient have one of the following disease processes/diagnoses(primary or secondary)?  Sepsis   Week 2 attempt successful?  No   Unsuccessful attempts  Attempt 1          Anna Sanchez LPN

## 2021-03-18 NOTE — PROGRESS NOTES
Jean Mane is a 76 y.o. female presenting today for follow up of   Chief Complaint   Patient presents with   • Blood Infection     d/c from hospital 3/8/21   • Urinary Tract Infection       History of Present Illness     Pt here for hospital follow-up.  She was hospitalized 3/6/21-3/8/21 with e. Coli UTI and sepsis.  She had mental status changes and acute renal injury, both of which resolved to baseline quickly with treatment.  She completed a course of outpatient cefuroxime and has seen Dr. Plaza, urologist.  Cystoscopy is planned for May 2021.  She is feeling fine at this point and says she is back to baseline.  She is focusing on increased water intake.  Appetite is okay.    Patient Active Problem List   Diagnosis   • Depression with anxiety   • Back pain   • Arthritis   • CKD (chronic kidney disease) stage 3, GFR 30-59 ml/min (CMS/HCC)   • Hyperlipidemia   • GERD (gastroesophageal reflux disease)   • Diplopia   • Memory changes   • Snoring   • Anemia   • Head injury   • Late onset Alzheimer's disease without behavioral disturbance (CMS/[facility])   • Ecchymosis of right eye   • Dependent edema   • OAB (overactive bladder)   • Chronic pain of left knee   • Primary osteoarthritis of left knee   • Greater trochanteric bursitis of both hips   • Routine health maintenance   • Non-healing ulcer of lower leg, left, limited to breakdown of skin (CMS/HCC)   • Bilateral hip pain   • Complete tear of left rotator cuff   • Primary osteoarthritis, left shoulder   • Urinary tract infection in female   • Arthritis of right knee   • History of bleeding ulcers   • Depression   • S/P total knee arthroplasty       Current Outpatient Medications on File Prior to Visit   Medication Sig   • clonazePAM (KlonoPIN) 0.5 MG tablet TAKE TWO TABLETS BY MOUTH ONCE NIGHTLY AS NEEDED   • FLUoxetine (PROzac) 20 MG tablet TAKE TWO TABLETS BY MOUTH DAILY   • fluticasone (FLONASE) 50 MCG/ACT nasal spray 2 sprays into the nostril(s) as  "directed by provider Daily.   • furosemide (LASIX) 20 MG tablet TAKE TWO TABLETS BY MOUTH DAILY   • gabapentin (NEURONTIN) 400 MG capsule Take 400 mg by mouth every 4 (four) hours.   • memantine (NAMENDA) 10 MG tablet TAKE ONE TABLET BY MOUTH DAILY   • Morphine (MSIR) 30 MG tablet 60 mg 3 (Three) Times a Day. Pt takes 2 po three times a day   • polyethylene glycol (MIRALAX) 17 g packet Take 17 g by mouth Daily As Needed (contipation).   • potassium chloride 10 MEQ CR tablet TAKE TWO TABLETS BY MOUTH DAILY   • [DISCONTINUED] cefuroxime (CEFTIN) 500 MG tablet Take 1 tablet by mouth 2 (Two) Times a Day.     No current facility-administered medications on file prior to visit.          The following portions of the patient's history were reviewed and updated as appropriate: allergies, current medications, past family history, past medical history, past social history, past surgical history and problem list.    Review of Systems   Constitutional: Negative for chills, fever and unexpected weight gain.   Respiratory: Negative for cough and shortness of breath.    Cardiovascular: Positive for leg swelling. Negative for chest pain and palpitations.   Genitourinary: Positive for frequency and urinary incontinence. Negative for dysuria and flank pain.   Musculoskeletal: Positive for back pain.   Neurological: Negative for confusion.       Objective   Vitals:    03/18/21 1342   BP: 100/80   BP Location: Left arm   Patient Position: Sitting   Cuff Size: Large Adult   Pulse: 64   Resp: 16   Temp: 98.2 °F (36.8 °C)   TempSrc: Temporal   SpO2: 97%   Weight: 75.8 kg (167 lb)   Height: 162.6 cm (64\")       BP Readings from Last 3 Encounters:   03/18/21 100/80   03/08/21 147/71   02/04/21 98/60        Wt Readings from Last 3 Encounters:   03/18/21 75.8 kg (167 lb)   03/15/21 73.9 kg (163 lb)   03/07/21 74 kg (163 lb 2.3 oz)        Body mass index is 28.67 kg/m².  Nursing notes and vitals reviewed.    Physical Exam  Vitals and nursing " note reviewed.   Constitutional:       Appearance: Normal appearance.   HENT:      Head: Normocephalic and atraumatic.      Mouth/Throat:      Mouth: Mucous membranes are moist.   Eyes:      Extraocular Movements: Extraocular movements intact.      Conjunctiva/sclera: Conjunctivae normal.   Cardiovascular:      Rate and Rhythm: Normal rate and regular rhythm.      Heart sounds: No murmur heard.   No gallop.    Pulmonary:      Effort: No respiratory distress.      Breath sounds: Normal breath sounds.   Abdominal:      Palpations: Abdomen is soft.      Tenderness: There is no abdominal tenderness.   Musculoskeletal:      Cervical back: Neck supple. No rigidity.      Right lower leg: Edema (trace) present.      Left lower leg: Edema (to shin) present.   Skin:     General: Skin is warm and dry.   Neurological:      General: No focal deficit present.      Mental Status: She is alert and oriented to person, place, and time.   Psychiatric:         Mood and Affect: Mood normal.         Behavior: Behavior normal.         Recent Results (from the past 672 hour(s))   ECG 12 Lead    Collection Time: 03/06/21  2:14 PM   Result Value Ref Range    QT Interval 491 ms   Comprehensive Metabolic Panel    Collection Time: 03/06/21  2:24 PM    Specimen: Blood   Result Value Ref Range    Glucose 92 65 - 99 mg/dL    BUN 43 (H) 8 - 23 mg/dL    Creatinine 2.13 (H) 0.57 - 1.00 mg/dL    Sodium 141 136 - 145 mmol/L    Potassium 4.5 3.5 - 5.2 mmol/L    Chloride 103 98 - 107 mmol/L    CO2 28.2 22.0 - 29.0 mmol/L    Calcium 9.1 8.6 - 10.5 mg/dL    Total Protein 7.2 6.0 - 8.5 g/dL    Albumin 3.60 3.50 - 5.20 g/dL    ALT (SGPT) 18 1 - 33 U/L    AST (SGOT) 25 1 - 32 U/L    Alkaline Phosphatase 85 39 - 117 U/L    Total Bilirubin 0.5 0.0 - 1.2 mg/dL    eGFR Non African Amer 23 (L) >60 mL/min/1.73    Globulin 3.6 gm/dL    A/G Ratio 1.0 g/dL    BUN/Creatinine Ratio 20.2 7.0 - 25.0    Anion Gap 9.8 5.0 - 15.0 mmol/L   Troponin    Collection Time:  03/06/21  2:24 PM    Specimen: Blood   Result Value Ref Range    Troponin T 0.024 0.000 - 0.030 ng/mL   CBC Auto Differential    Collection Time: 03/06/21  2:24 PM    Specimen: Blood   Result Value Ref Range    WBC 10.09 3.40 - 10.80 10*3/mm3    RBC 3.63 (L) 3.77 - 5.28 10*6/mm3    Hemoglobin 10.8 (L) 12.0 - 15.9 g/dL    Hematocrit 35.6 34.0 - 46.6 %    MCV 98.1 (H) 79.0 - 97.0 fL    MCH 29.8 26.6 - 33.0 pg    MCHC 30.3 (L) 31.5 - 35.7 g/dL    RDW 14.5 12.3 - 15.4 %    RDW-SD 52.3 37.0 - 54.0 fl    MPV 9.7 6.0 - 12.0 fL    Platelets 187 140 - 450 10*3/mm3    Neutrophil % 80.0 (H) 42.7 - 76.0 %    Lymphocyte % 8.2 (L) 19.6 - 45.3 %    Monocyte % 10.7 5.0 - 12.0 %    Eosinophil % 0.6 0.3 - 6.2 %    Basophil % 0.2 0.0 - 1.5 %    Immature Grans % 0.3 0.0 - 0.5 %    Neutrophils, Absolute 8.07 (H) 1.70 - 7.00 10*3/mm3    Lymphocytes, Absolute 0.83 0.70 - 3.10 10*3/mm3    Monocytes, Absolute 1.08 (H) 0.10 - 0.90 10*3/mm3    Eosinophils, Absolute 0.06 0.00 - 0.40 10*3/mm3    Basophils, Absolute 0.02 0.00 - 0.20 10*3/mm3    Immature Grans, Absolute 0.03 0.00 - 0.05 10*3/mm3    nRBC 0.0 0.0 - 0.2 /100 WBC   Lactic Acid, Plasma    Collection Time: 03/06/21  2:24 PM    Specimen: Blood   Result Value Ref Range    Lactate 1.1 0.5 - 2.0 mmol/L   Procalcitonin    Collection Time: 03/06/21  2:24 PM    Specimen: Blood   Result Value Ref Range    Procalcitonin 3.38 (H) 0.00 - 0.25 ng/mL   Magnesium    Collection Time: 03/06/21  2:24 PM    Specimen: Blood   Result Value Ref Range    Magnesium 2.1 1.6 - 2.4 mg/dL   Phosphorus    Collection Time: 03/06/21  2:24 PM    Specimen: Blood   Result Value Ref Range    Phosphorus 3.6 2.5 - 4.5 mg/dL   Hemoglobin A1c    Collection Time: 03/06/21  2:24 PM    Specimen: Blood   Result Value Ref Range    Hemoglobin A1C 5.20 4.80 - 5.60 %   TSH    Collection Time: 03/06/21  2:24 PM    Specimen: Blood   Result Value Ref Range    TSH 1.530 0.270 - 4.200 uIU/mL   Urinalysis With Microscopic If Indicated  (No Culture) - Urine, Catheter    Collection Time: 03/06/21  2:36 PM    Specimen: Urine, Catheter   Result Value Ref Range    Color, UA Yellow Yellow, Straw    Appearance, UA Cloudy (A) Clear    pH, UA 6.0 4.5 - 8.0    Specific Gravity, UA 1.015 1.003 - 1.030    Glucose, UA Negative Negative    Ketones, UA Negative Negative    Bilirubin, UA Negative Negative    Blood, UA Small (1+) (A) Negative    Protein, UA Negative Negative    Leuk Esterase, UA Large (3+) (A) Negative    Nitrite, UA Positive (A) Negative    Urobilinogen, UA 0.2 E.U./dL 0.2 - 1.0 E.U./dL   Urinalysis, Microscopic Only - Urine, Catheter    Collection Time: 03/06/21  2:36 PM    Specimen: Urine, Catheter   Result Value Ref Range    RBC, UA 3-5 (A) None Seen /HPF    WBC, UA 31-50 (A) None Seen /HPF    Bacteria, UA 4+ (A) None Seen /HPF    Squamous Epithelial Cells, UA 0-2 None Seen, 0-2 /HPF    Hyaline Casts, UA None Seen None Seen /LPF    Amorphous Crystals, UA Moderate/2+ None Seen /HPF    Methodology Manual Light Microscopy    Urine Culture - Urine, Urine, Catheter    Collection Time: 03/06/21  2:36 PM    Specimen: Urine, Catheter   Result Value Ref Range    Urine Culture >100,000 CFU/mL Escherichia coli (A)        Susceptibility    Escherichia coli - RIDGE     Ampicillin 8 Susceptible ug/ml     Ampicillin + Sulbactam <=2 Susceptible ug/ml     Cefazolin <=4 Susceptible ug/ml     Cefepime <=1 Susceptible ug/ml     Ceftazidime <=1 Susceptible ug/ml     Ceftriaxone <=1 Susceptible ug/ml     Gentamicin <=1 Susceptible ug/ml     Levofloxacin <=0.12 Susceptible ug/ml     Nitrofurantoin <=16 Susceptible ug/ml     Piperacillin + Tazobactam <=4 Susceptible ug/ml     Tetracycline <=1 Susceptible ug/ml     Trimethoprim + Sulfamethoxazole <=20 Susceptible ug/ml   Blood Culture - Blood, Arm, Left    Collection Time: 03/06/21  4:19 PM    Specimen: Arm, Left; Blood   Result Value Ref Range    Blood Culture No growth at 5 days    COVID-19 and FLU A/B PCR - Swab,  Nasopharynx    Collection Time: 03/06/21  4:24 PM    Specimen: Nasopharynx; Swab   Result Value Ref Range    COVID19 Not Detected Not Detected - Ref. Range    Influenza A PCR Not Detected Not Detected    Influenza B PCR Not Detected Not Detected   Blood Culture - Blood, Hand, Right    Collection Time: 03/06/21  4:38 PM    Specimen: Hand, Right; Blood   Result Value Ref Range    Blood Culture No growth at 5 days    ECG 12 Lead    Collection Time: 03/06/21  8:43 PM   Result Value Ref Range    QT Interval 485 ms   CBC Auto Differential    Collection Time: 03/07/21  4:46 AM    Specimen: Blood   Result Value Ref Range    WBC 9.60 3.40 - 10.80 10*3/mm3    RBC 3.00 (L) 3.77 - 5.28 10*6/mm3    Hemoglobin 8.8 (L) 12.0 - 15.9 g/dL    Hematocrit 29.6 (L) 34.0 - 46.6 %    MCV 98.7 (H) 79.0 - 97.0 fL    MCH 29.3 26.6 - 33.0 pg    MCHC 29.7 (L) 31.5 - 35.7 g/dL    RDW 14.6 12.3 - 15.4 %    RDW-SD 53.1 37.0 - 54.0 fl    MPV 9.7 6.0 - 12.0 fL    Platelets 150 140 - 450 10*3/mm3    Neutrophil % 75.7 42.7 - 76.0 %    Lymphocyte % 14.0 (L) 19.6 - 45.3 %    Monocyte % 7.8 5.0 - 12.0 %    Eosinophil % 1.9 0.3 - 6.2 %    Basophil % 0.2 0.0 - 1.5 %    Immature Grans % 0.4 0.0 - 0.5 %    Neutrophils, Absolute 7.27 (H) 1.70 - 7.00 10*3/mm3    Lymphocytes, Absolute 1.34 0.70 - 3.10 10*3/mm3    Monocytes, Absolute 0.75 0.10 - 0.90 10*3/mm3    Eosinophils, Absolute 0.18 0.00 - 0.40 10*3/mm3    Basophils, Absolute 0.02 0.00 - 0.20 10*3/mm3    Immature Grans, Absolute 0.04 0.00 - 0.05 10*3/mm3    nRBC 0.0 0.0 - 0.2 /100 WBC   Magnesium    Collection Time: 03/07/21  4:46 AM    Specimen: Blood   Result Value Ref Range    Magnesium 1.8 1.6 - 2.4 mg/dL   Phosphorus    Collection Time: 03/07/21  4:46 AM    Specimen: Blood   Result Value Ref Range    Phosphorus 3.4 2.5 - 4.5 mg/dL   Comprehensive Metabolic Panel    Collection Time: 03/07/21  4:46 AM    Specimen: Blood   Result Value Ref Range    Glucose 89 65 - 99 mg/dL    BUN 34 (H) 8 - 23 mg/dL     Creatinine 1.49 (H) 0.57 - 1.00 mg/dL    Sodium 144 136 - 145 mmol/L    Potassium 4.3 3.5 - 5.2 mmol/L    Chloride 111 (H) 98 - 107 mmol/L    CO2 25.3 22.0 - 29.0 mmol/L    Calcium 8.0 (L) 8.6 - 10.5 mg/dL    Total Protein 5.8 (L) 6.0 - 8.5 g/dL    Albumin 2.70 (L) 3.50 - 5.20 g/dL    ALT (SGPT) 20 1 - 33 U/L    AST (SGOT) 47 (H) 1 - 32 U/L    Alkaline Phosphatase 70 39 - 117 U/L    Total Bilirubin 0.3 0.0 - 1.2 mg/dL    eGFR Non African Amer 34 (L) >60 mL/min/1.73    Globulin 3.1 gm/dL    A/G Ratio 0.9 g/dL    BUN/Creatinine Ratio 22.8 7.0 - 25.0    Anion Gap 7.7 5.0 - 15.0 mmol/L   Procalcitonin    Collection Time: 03/07/21  4:46 AM    Specimen: Blood   Result Value Ref Range    Procalcitonin 6.50 (H) 0.00 - 0.25 ng/mL   Lactic Acid, Plasma    Collection Time: 03/07/21  4:47 AM    Specimen: Blood   Result Value Ref Range    Lactate 0.6 0.5 - 2.0 mmol/L   ECG 12 Lead    Collection Time: 03/07/21  2:42 PM   Result Value Ref Range    QT Interval 452 ms   Magnesium    Collection Time: 03/08/21  7:07 AM    Specimen: Blood   Result Value Ref Range    Magnesium 1.7 1.6 - 2.4 mg/dL   Phosphorus    Collection Time: 03/08/21  7:07 AM    Specimen: Blood   Result Value Ref Range    Phosphorus 2.3 (L) 2.5 - 4.5 mg/dL   Comprehensive Metabolic Panel    Collection Time: 03/08/21  7:07 AM    Specimen: Blood   Result Value Ref Range    Glucose 94 65 - 99 mg/dL    BUN 23 8 - 23 mg/dL    Creatinine 1.35 (H) 0.57 - 1.00 mg/dL    Sodium 139 136 - 145 mmol/L    Potassium 4.0 3.5 - 5.2 mmol/L    Chloride 106 98 - 107 mmol/L    CO2 26.0 22.0 - 29.0 mmol/L    Calcium 8.7 8.6 - 10.5 mg/dL    Total Protein 6.3 6.0 - 8.5 g/dL    Albumin 2.90 (L) 3.50 - 5.20 g/dL    ALT (SGPT) 23 1 - 33 U/L    AST (SGOT) 48 (H) 1 - 32 U/L    Alkaline Phosphatase 75 39 - 117 U/L    Total Bilirubin 0.5 0.0 - 1.2 mg/dL    eGFR Non African Amer 38 (L) >60 mL/min/1.73    Globulin 3.4 gm/dL    A/G Ratio 0.9 g/dL    BUN/Creatinine Ratio 17.0 7.0 - 25.0    Anion Gap  7.0 5.0 - 15.0 mmol/L   CBC Auto Differential    Collection Time: 03/08/21  7:07 AM    Specimen: Blood   Result Value Ref Range    WBC 6.61 3.40 - 10.80 10*3/mm3    RBC 3.08 (L) 3.77 - 5.28 10*6/mm3    Hemoglobin 9.2 (L) 12.0 - 15.9 g/dL    Hematocrit 30.1 (L) 34.0 - 46.6 %    MCV 97.7 (H) 79.0 - 97.0 fL    MCH 29.9 26.6 - 33.0 pg    MCHC 30.6 (L) 31.5 - 35.7 g/dL    RDW 14.5 12.3 - 15.4 %    RDW-SD 52.5 37.0 - 54.0 fl    MPV 10.1 6.0 - 12.0 fL    Platelets 151 140 - 450 10*3/mm3    Neutrophil % 70.7 42.7 - 76.0 %    Lymphocyte % 17.5 (L) 19.6 - 45.3 %    Monocyte % 8.5 5.0 - 12.0 %    Eosinophil % 2.7 0.3 - 6.2 %    Basophil % 0.3 0.0 - 1.5 %    Immature Grans % 0.3 0.0 - 0.5 %    Neutrophils, Absolute 4.67 1.70 - 7.00 10*3/mm3    Lymphocytes, Absolute 1.16 0.70 - 3.10 10*3/mm3    Monocytes, Absolute 0.56 0.10 - 0.90 10*3/mm3    Eosinophils, Absolute 0.18 0.00 - 0.40 10*3/mm3    Basophils, Absolute 0.02 0.00 - 0.20 10*3/mm3    Immature Grans, Absolute 0.02 0.00 - 0.05 10*3/mm3    nRBC 0.0 0.0 - 0.2 /100 WBC         Assessment/Plan   Diagnoses and all orders for this visit:    1. Sepsis due to Escherichia coli with acute renal failure, unspecified acute renal failure type, unspecified whether septic shock present (CMS/[facility]) (Primary)    2. Dependent edema  -     Comprehensive Metabolic Panel  -     CBC & Differential  -     BNP ([facility] Only); Future    1. Sepsis due to E. Coli UTI.  Hospital records reviewed.  She is back to baseline and has already established with Dr. Plaza and had repeat urinalysis.  Completed course of antibiotics.    2. Dependent edema.  Continue furosemide.  Check BNP.  Compression stockings.        Medications, including side effects, were discussed with the patient. Patient verbalized understanding.  The plan of care was discussed. All questions were answered. Patient verbalized understanding.      Return for Next scheduled follow up.

## 2021-03-19 NOTE — OUTREACH NOTE
Sepsis Week 2 Survey      Responses   [facility] [place] patient discharged from?  [place]   Does the patient have one of the following disease processes/diagnoses(primary or secondary)?  Sepsis   Week 2 attempt successful?  No   Unsuccessful attempts  Attempt 2          Joel Pope RN

## 2021-03-26 NOTE — OUTREACH NOTE
Sepsis Week 3 Survey      Responses   [facility] patient discharged from?  [place]   Does the patient have one of the following disease processes/diagnoses(primary or secondary)?  Sepsis   Week 3 attempt successful?  Yes   Call start time  1601   Call end time  1602   Discharge diagnosis  Sepsis Pneumonia of left lower lobe    Meds reviewed with patient/caregiver?  Yes   Is the patient taking all medications as directed (includes completed medication regime)?  Yes   Medication comments  finished antibx   Has the patient kept scheduled appointments due by today?  Yes   Psychosocial issues?  No   What is the patient's perception of their health status since discharge?  Improving   Nursing interventions  Nurse provided patient education   Is the patient/caregiver able to teach back Sepsis?  E - Extreme pain or generalized discomfort (worst ever,especially abdomen)   Is patient/caregiver able to teach back steps to recovery at home?  Rest and regain strength   Is the patient/caregiver able to teach back signs and symptoms of worsening condition:  Fever   Is the patient/caregiver able to teach back the hierarchy of who to call/visit for symptoms/problems? PCP, Specialist, Home health nurse, Urgent Care, ED, 911  Yes   Additional teach back comments  Enc fluids and pt states she is doing well   Week 3 call completed?  Yes          Rosa Rosenberg RN

## 2021-04-14 NOTE — ED PROVIDER NOTES
Subjective   History of Present Illness  76-year-old female presents to the emergency room complaining of skin tear to her right leg. She says she was trying to put some pants on she lost her balance and got caught up in her walker. She denies any other injuries, denies hitting her head, denies loss consciousness, denies neck pain back pain says she can walk as well as normal just has the laceration. She reports her last tetanus shot was sometime about about a year ago but certainly within 5 years  Review of Systems   Constitutional: Negative for chills and fever.   Musculoskeletal: Negative for back pain and neck pain.   Skin: Positive for wound.       Past Medical History:   Diagnosis Date   • Anxiety    • Arthritis    • Back pain     SEES DR. BERRY FOR THIS   • Depression    • Scoliosis        No Known Allergies    Past Surgical History:   Procedure Laterality Date   • COLONOSCOPY     • JOINT REPLACEMENT     • REPLACEMENT TOTAL KNEE Right    • SPINAL CORD STIMULATOR IMPLANT  2011   • SPINAL CORD STIMULATOR IMPLANT         Family History   Problem Relation Age of Onset   • Cancer Father    • COPD Father    • Breast cancer Paternal Aunt        Social History     Socioeconomic History   • Marital status:      Spouse name: Not on file   • Number of children: Not on file   • Years of education: Not on file   • Highest education level: Not on file   Tobacco Use   • Smoking status: Never Smoker   • Smokeless tobacco: Never Used   Vaping Use   • Vaping Use: Never used   Substance and Sexual Activity   • Alcohol use: No   • Drug use: Yes     Types: Morphine   • Sexual activity: Defer           Objective    ED Triage Vitals [04/14/21 1645]   Temp Heart Rate Resp BP SpO2   98.5 °F (36.9 °C) 62 18 123/77 99 %      Temp src Heart Rate Source Patient Position BP Location FiO2 (%)   Oral Apical Lying Right arm --       Physical Exam  INITIAL VITAL SIGNS: Reviewed by me.  Pulse ox normal  GENERAL: Alert. Well developed  and well nourished. No respiratory distress.  HEAD: Normocephalic.   EYES: No conjunctival injection.  ENT: Oral mucosa is moist.  NECK: Supple. Full range of motion.  RESPIRATORY: Non-labored respirations.  EXTREMITIES: No deformity. Semioval skin tear over the right shin.  SKIN: Warm and dry. No rashes. No diaphoresis.  NEUROLOGIC: Alert. Normal gait    Laceration Repair    Date/Time: 4/14/2021 5:33 PM  Performed by: Seth Mancilla MD  Authorized by: Seth Mancilla MD     Consent:     Consent obtained:  Verbal    Consent given by:  Patient    Risks discussed:  Infection, poor cosmetic result and poor wound healing    Alternatives discussed:  No treatment  Anesthesia (see MAR for exact dosages):     Anesthesia method:  None  Laceration details:     Location:  Leg    Leg location:  R lower leg    Length (cm):  8  Repair type:     Repair type:  Simple  Treatment:     Amount of cleaning:  Extensive    Irrigation solution:  Sterile saline  Skin repair:     Repair method:  Tissue adhesive and Steri-Strips    Number of Steri-Strips:  6  Approximation:     Approximation:  Close  Post-procedure details:     Dressing:  Non-adherent dressing    Patient tolerance of procedure:  Tolerated well, no immediate complications               ED Course                                           MDM  76-year-old female skin tear right leg, irrigated copiously appears clean tissue is contused discussed with patient poor healing due to the thin skin had wound margin Steri-Strips and tissue adhesive was used rather than sutures. She is up-to-date on her tetanus.  Final diagnoses:   Noninfected skin tear of right lower extremity, initial encounter       ED Disposition  ED Disposition     ED Disposition Condition Comment    Discharge Good           Sriram Blanton MD  [facility] [address] [address] [phone]    Call   For wound re-check         Medication List      No changes were made to your prescriptions  during this visit.          Seth Mancilla MD  04/14/21 9532

## 2021-04-14 NOTE — DISCHARGE INSTRUCTIONS
Keep wound clean and dry for 48 hours. After that you may shower please do not scrub at the region please gently dab dry with a towel. Do not take a bath until wound is fully healed.

## 2021-04-15 NOTE — OUTREACH NOTE
Care Plan Note      Responses   Annual Wellness Visit:   Patient Has Completed   Care Gaps Addressed  Flu Shot, Mammogram, Pneumonia Vaccine   Flu Shot Status  Up to Date   Mammogram Status  Up to Date   Mammogram Completion at [facility] or Other  [facility]   Pneumonia Vaccine Status  Up to Date   Other Patient Education/Resources   24/7 [facility] Nurse Call Line, Advanced Care Planning, [facility]   24/7 Nurse Call Line Education Method  Verbal   ACP Education Method  Verbal [Has information. Needs to complete]   MyChart Education Method  Verbal [Declines]   Does patient have depression diagnosis?  Yes   Advanced Directives:  -- [Has information. Needs to complete]   Ed Visits past 12 months:  1   Hospitalizations past 12 months  1   Medication Adherence  Medications understood        The main concerns and/or symptoms the patient would like to address are: Talked with patient. Discussed 4/14/21 ED visit regarding noninfected skin tear to right lower extremity. Patient states to be compliant with ED recommendations; dressing; dry; intact with no drainage or redness noted and voiced intent to contact PCP as needed. Patient lives with spouse; independent with ADL's; meal preparation; transportation(has assistance as needed from spouse) and ambulates with walker . Patient compliant with medications and  medical appointments. Patient reports improvement regarding right hip pain and   no difficulty with fever; voiding; chest pan; SOB; appetite or sleeping. Patient has 4/296/21 ortho appointment and 5/6/21 PCP appointment.     Education/instruction provided by Care Coordinator: Reviewed with patient ED AVS recommendations; education provided; fall and safety precautions; COVID 19 precautions; 24/7 Nurse Line Telephone number; ACM contact information; Advance Directives; My Chart; gaps in care; MWV and Case Management services.  Patient verbalized understanding and states to appreciate phone call. SDOH reviewed and  states no difficulty with food or transportation.  No further questions or concerns voiced at this time.     Follow Up Outreach Due: Follow up as needed.     Yesi Hutchinson RN  Ambulatory     4/15/2021, 12:51 EDT

## 2021-04-29 NOTE — PROGRESS NOTES
Subjective:     Patient ID: Sandra Mane is a 76 y.o. female.    Chief Complaint:  Follow-up DJD left shoulder, subacromial bursitis left shoulder  Follow-up DJD left knee  History of Present Illness  Sandra Mane returns to clinic for follow-up of her left knee and left shoulder.  Received corticosteroid injection last visit 3/15/2021 glenohumeral joint without significant symptom relief.  She is experiencing pain mostly at the lateral aspect of the acromion increased pain noted with use mild symptom relief with rest.  Pain does radiate from the lateral aspect of the acromion inferiorly mid humerus and stops.  She does report new injury where she fell at home after she tripped over her walker suffered gash at the right lower extremity is seeing primary care for that on Monday.  He did began noticing return of pain after fall at home with the left knee.  Received corticosteroid injection to the left knee back in January has done fairly well since that time.  Swelling slightly present bilateral lower extremities is not experiencing significant amount of pain at the left knee mild pain is present.  Denies other concerns present time.    Social History     Occupational History   • Not on file   Tobacco Use   • Smoking status: Never Smoker   • Smokeless tobacco: Never Used   Vaping Use   • Vaping Use: Never used   Substance and Sexual Activity   • Alcohol use: No   • Drug use: Yes     Types: Morphine   • Sexual activity: Defer      Past Medical History:   Diagnosis Date   • Anxiety    • Arthritis    • Back pain     SEES DR. BERRY FOR THIS   • Depression    • Scoliosis      Past Surgical History:   Procedure Laterality Date   • COLONOSCOPY     • JOINT REPLACEMENT     • REPLACEMENT TOTAL KNEE Right    • SPINAL CORD STIMULATOR IMPLANT  2011   • SPINAL CORD STIMULATOR IMPLANT         Family History   Problem Relation Age of Onset   • Cancer Father    • COPD Father    • Breast cancer Paternal Aunt   "          Objective:  Physical Exam    General: No acute distress.  Eyes: conjunctiva clear; pupils equally round and reactive  ENT: external ears and nose atraumatic; oropharynx clear  CV: no peripheral edema  Resp: normal respiratory effort  Skin: no rashes or wounds; normal turgor  Psych: mood and affect appropriate; recent and remote memory intact    Vitals:    04/29/21 1543   Weight: 74.8 kg (165 lb)   Height: 165.1 cm (65\")         04/29/21  1543   Weight: 74.8 kg (165 lb)     Body mass index is 27.46 kg/m².      Left Knee Exam     Tenderness   The patient is experiencing tenderness in the medial joint line and patella.    Range of Motion   Extension: 5   Flexion: 120     Tests   Zonia:  Medial - negative Lateral - negative  Varus: negative Valgus: negative  Lachman:  Anterior - 1+      Drawer:  Anterior - negative       Patellar apprehension: positive    Other   Erythema: absent  Sensation: normal  Pulse: present  Swelling: moderate  Effusion: no effusion present    Comments:  Positive tenderness across the anterior aspect as well as the superior lateral aspect of the knee, slight ecchymosis to the superior lateral aspect of the knee no evidence of any skin breakdown below knee, no evidence of any erythema.             Left Shoulder Exam      Tenderness   The patient is experiencing tenderness in the acromion.     Range of Motion   External rotation: 50 passive  Forward flexion: 160 passive   Internal rotation 0 degrees: to side     Muscle Strength   Internal rotation: 4/5   External rotation: 4/5   Supraspinatus: 4/5   Subscapularis: 4/5   Biceps: 4/5      Tests   Cross arm: negative  Drop arm: negative     Other   Erythema: absent  Sensation: normal  Pulse: present      Comments:    Maximal tenderness anterior joint line  positive empty can  negative [place]'s  negative Speed's  negative bear hug exam       Assessment:        1. Primary osteoarthritis, left shoulder    2. Complete tear of left rotator " cuff, unspecified whether traumatic    3. Primary osteoarthritis of left knee           Plan:  1.  Discussed plan of care with patient and spouse.  At this time discussed treatment options including viscosupplementation injection versus corticosteroid injections into the knee.  Given the swelling that she is experiencing the pain wishes to proceed with corticosteroid injection to the left knee.  Also discussed treatment options regarding shoulder does not wish to proceed with any MRI or surgical intervention of the left shoulder wishes to proceed with corticosteroid injection subacromial bursa.  Plan to see her back in clinic in 2 months to reevaluate.  Discussed at that time if not improving we always have the option of submitting for viscosupplementation injections.  Large Joint Arthrocentesis: L knee  Date/Time: 4/29/2021 4:18 PM  Consent given by: patient  Site marked: site marked  Timeout: Immediately prior to procedure a time out was called to verify the correct patient, procedure, equipment, support staff and site/side marked as required   Supporting Documentation  Indications: pain   Procedure Details  Location: knee - L knee  Preparation: Patient was prepped and draped in the usual sterile fashion  Needle size: 22 G  Approach: superior  Medications administered: 4 mL lidocaine PF 1% 1 %; 12 mg betamethasone acetate-betamethasone sodium phosphate 6 (3-3) MG/ML  Patient tolerance: patient tolerated the procedure well with no immediate complications    Large Joint Arthrocentesis: L subacromial bursa  Date/Time: 4/29/2021 4:19 PM  Consent given by: patient  Site marked: site marked  Supporting Documentation  Indications: pain   Procedure Details  Location: shoulder - L subacromial bursa  Preparation: Patient was prepped and draped in the usual sterile fashion  Needle size: 22 G  Medications administered: 4 mL lidocaine PF 1% 1 %; 12 mg betamethasone acetate-betamethasone sodium phosphate 6 (3-3) MG/ML  Patient  tolerance: patient tolerated the procedure well with no immediate complications        Orders:  Orders Placed This Encounter   Procedures   • Large Joint Arthrocentesis: L knee   • Large Joint Arthrocentesis: L subacromial bursa       Medications:  No orders of the defined types were placed in this encounter.      Followup:  No follow-ups on file.    Diagnoses and all orders for this visit:    1. Primary osteoarthritis, left shoulder (Primary)    2. Complete tear of left rotator cuff, unspecified whether traumatic    3. Primary osteoarthritis of left knee    Other orders  -     Large Joint Arthrocentesis: L knee  -     Large Joint Arthrocentesis: L subacromial bursa        Dictated utilizing Dragon dictation

## 2021-05-03 NOTE — PROGRESS NOTES
Subjective   Sandra Mane is a 76 y.o. female presenting today for follow up of   Chief Complaint   Patient presents with   • Hospital Follow Up Visit   • Fall     4/14/21       History of Present Illness     Pt presents for ER follow-up.  She went to the ER on 4/14/21 after she lacerated her right shin while falling against her walker.  Steri strips were applied but the wound came open.  She wasn't a candidate for sutures due to the thin texture of the skin.  She denies pain, surrounding redness, drainage.  She has been intermittently dressing it with Vaseline and a bandage.    Patient Active Problem List   Diagnosis   • Depression with anxiety   • Back pain   • Arthritis   • CKD (chronic kidney disease) stage 3, GFR 30-59 ml/min (CMS/HCC)   • Hyperlipidemia   • GERD (gastroesophageal reflux disease)   • Diplopia   • Memory changes   • Snoring   • Anemia   • Head injury   • Late onset Alzheimer's disease without behavioral disturbance (CMS/HCC)   • Ecchymosis of right eye   • Dependent edema   • OAB (overactive bladder)   • Chronic pain of left knee   • Primary osteoarthritis of left knee   • Greater trochanteric bursitis of both hips   • Routine health maintenance   • Non-healing ulcer of lower leg, left, limited to breakdown of skin (CMS/HCC)   • Bilateral hip pain   • Complete tear of left rotator cuff   • Primary osteoarthritis, left shoulder   • Urinary tract infection in female   • Arthritis of right knee   • History of bleeding ulcers   • Depression   • S/P total knee arthroplasty       Current Outpatient Medications on File Prior to Visit   Medication Sig   • clonazePAM (KlonoPIN) 0.5 MG tablet TAKE TWO TABLETS BY MOUTH ONCE NIGHTLY AS NEEDED   • FLUoxetine (PROzac) 20 MG tablet TAKE TWO TABLETS BY MOUTH DAILY   • fluticasone (FLONASE) 50 MCG/ACT nasal spray 2 sprays into the nostril(s) as directed by provider Daily.   • furosemide (LASIX) 20 MG tablet TAKE TWO TABLETS BY MOUTH DAILY   • gabapentin  "(NEURONTIN) 400 MG capsule Take 400 mg by mouth every 4 (four) hours.   • memantine (NAMENDA) 10 MG tablet TAKE ONE TABLET BY MOUTH DAILY   • Morphine (MSIR) 30 MG tablet 60 mg 3 (Three) Times a Day. Pt takes 2 po three times a day   • polyethylene glycol (MIRALAX) 17 g packet Take 17 g by mouth Daily As Needed (contipation).   • potassium chloride 10 MEQ CR tablet TAKE TWO TABLETS BY MOUTH DAILY     No current facility-administered medications on file prior to visit.          The following portions of the patient's history were reviewed and updated as appropriate: allergies, current medications, past family history, past medical history, past social history, past surgical history and problem list.    Review of Systems   Constitutional: Negative for fever.   Skin:        Large wound right shin.       Objective   Vitals:    05/03/21 1441   BP: 132/62   Pulse: 65   Temp: 97.3 °F (36.3 °C)   SpO2: 98%   Weight: 78 kg (172 lb)   Height: 165.1 cm (65\")       BP Readings from Last 3 Encounters:   05/03/21 132/62   04/14/21 120/70   03/18/21 100/80        Wt Readings from Last 3 Encounters:   05/03/21 78 kg (172 lb)   04/29/21 74.8 kg (165 lb)   04/14/21 74.8 kg (165 lb)        Body mass index is 28.62 kg/m².  Nursing notes and vitals reviewed.    Physical Exam  Vitals and nursing note reviewed.   Constitutional:       General: She is not in acute distress.  HENT:      Head: Normocephalic and atraumatic.      Mouth/Throat:      Mouth: Mucous membranes are moist.   Eyes:      Extraocular Movements: Extraocular movements intact.      Conjunctiva/sclera: Conjunctivae normal.   Pulmonary:      Effort: Pulmonary effort is normal. No respiratory distress.   Musculoskeletal:      Cervical back: Neck supple. No rigidity.   Skin:            Comments: 7 cm X 4 cm open wound right shin with no drainage or surrounding erythema; black colored tissue covering.   Neurological:      Mental Status: She is alert.   Psychiatric:         Mood " and Affect: Mood normal.         Behavior: Behavior normal.         No results found for this or any previous visit (from the past 672 hour(s)).      Assessment/Plan   Diagnoses and all orders for this visit:    1. Dehiscence of wound of skin, subsequent encounter (Primary)  -     Ambulatory Referral to Wound Clinic    2. Open leg wound, right, sequela  -     Ambulatory Referral to Wound Clinic      Ointment and a bandage placed.  Wound care instructions given.  Referral to wound clinic.        Medications, including side effects, were discussed with the patient. Patient verbalized understanding.  The plan of care was discussed. All questions were answered. Patient verbalized understanding.      No follow-ups on file.

## 2021-05-15 PROBLEM — J18.9 PNEUMONIA OF BOTH LUNGS DUE TO INFECTIOUS ORGANISM: Status: ACTIVE | Noted: 2021-01-01

## 2021-05-15 NOTE — ED PROVIDER NOTES
Subjective   History of Present Illness  76-year-old female with a history of mild dementia presents to the ER via EMS for altered mental status.  They report that the  did not give much history but said that he told them she was on her baseline and not answering questions normally and kept closing her eyes and did not want to keep them open.  She denies any complaints in my interview  Review of Systems   Unable to perform ROS: Mental status change       Past Medical History:   Diagnosis Date   • Anxiety    • Arthritis    • Back pain     SEES DR. BERRY FOR THIS   • Depression    • Scoliosis        No Known Allergies    Past Surgical History:   Procedure Laterality Date   • COLONOSCOPY     • JOINT REPLACEMENT     • REPLACEMENT TOTAL KNEE Right    • SPINAL CORD STIMULATOR IMPLANT  2011   • SPINAL CORD STIMULATOR IMPLANT         Family History   Problem Relation Age of Onset   • Cancer Father    • COPD Father    • Breast cancer Paternal Aunt        Social History     Socioeconomic History   • Marital status:      Spouse name: Not on file   • Number of children: Not on file   • Years of education: Not on file   • Highest education level: Not on file   Tobacco Use   • Smoking status: Never Smoker   • Smokeless tobacco: Never Used   Vaping Use   • Vaping Use: Never used   Substance and Sexual Activity   • Alcohol use: No   • Drug use: Yes     Types: Morphine     Comment: patient unable to answer-5/15/21   • Sexual activity: Defer           Objective    ED Triage Vitals   Temp Heart Rate Resp BP SpO2   05/15/21 1315 05/15/21 1315 05/15/21 1315 05/15/21 1320 05/15/21 1319   99.8 °F (37.7 °C) 80 18 (!) 88/55 92 %      Temp src Heart Rate Source Patient Position BP Location FiO2 (%)   05/15/21 1315 05/15/21 1315 05/15/21 1320 05/15/21 1320 --   Oral Monitor Lying Right arm        Physical Exam  INITIAL VITAL SIGNS: Reviewed by me.  Pulse ox normal  GENERAL: Alert and interactive. No acute distress.  HEAD: Head  is normocephalic.  EYES: EOMI. PERRL. No scleral icterus. No conjunctival injection.  ENT: Moist mucous membranes.   NECK: Supple. Full range of motion.  RESPIRATORY: No tachypnea. Clear breath sounds bilaterally. No wheezing. No rales. No rhonchi.  CV: Regular rate and rhythm. No murmurs. No rubs or gallops.  ABDOMEN: Soft, mildly distended, mildly tender to palpation in the lower abdomen  BACK:  No obvious deformity.  EXTREMITIES: No deformity. No clubbing or cyanosis. No edema.   SKIN: Warm and dry. No diaphoresis. No obvious rashes.  Bilateral lower legs are wrapped  NEUROLOGIC: Eyes are closed, she opens them to voice, she moves all extremities and endorses normal sensation.  She is oriented to her name, the month and that she is at a hospital although cannot tell me which hospital.   Results for orders placed or performed during the hospital encounter of 05/15/21   Comprehensive Metabolic Panel    Specimen: Blood   Result Value Ref Range    Glucose 87 65 - 99 mg/dL    BUN 20 8 - 23 mg/dL    Creatinine 1.31 (H) 0.57 - 1.00 mg/dL    Sodium 141 136 - 145 mmol/L    Potassium 4.8 3.5 - 5.2 mmol/L    Chloride 103 98 - 107 mmol/L    CO2 28.4 22.0 - 29.0 mmol/L    Calcium 8.7 8.6 - 10.5 mg/dL    Total Protein 6.8 6.0 - 8.5 g/dL    Albumin 3.50 3.50 - 5.20 g/dL    ALT (SGPT) 9 1 - 33 U/L    AST (SGOT) 14 1 - 32 U/L    Alkaline Phosphatase 91 39 - 117 U/L    Total Bilirubin 0.4 0.0 - 1.2 mg/dL    eGFR Non African Amer 40 (L) >60 mL/min/1.73    Globulin 3.3 gm/dL    A/G Ratio 1.1 g/dL    BUN/Creatinine Ratio 15.3 7.0 - 25.0    Anion Gap 9.6 5.0 - 15.0 mmol/L   Urinalysis With Culture If Indicated - Urine, Catheter In/Out    Specimen: Urine, Catheter In/Out   Result Value Ref Range    Color, UA Yellow Yellow, Straw    Appearance, UA Clear Clear    pH, UA 6.0 4.5 - 8.0    Specific Gravity, UA 1.015 1.003 - 1.030    Glucose, UA Negative Negative    Ketones, UA Negative Negative    Bilirubin, UA Negative Negative    Blood,  UA Negative Negative    Protein, UA Negative Negative    Leuk Esterase, UA Negative Negative    Nitrite, UA Negative Negative    Urobilinogen, UA 0.2 E.U./dL 0.2 - 1.0 E.U./dL   CBC Auto Differential    Specimen: Blood   Result Value Ref Range    WBC 13.11 (H) 3.40 - 10.80 10*3/mm3    RBC 3.28 (L) 3.77 - 5.28 10*6/mm3    Hemoglobin 9.9 (L) 12.0 - 15.9 g/dL    Hematocrit 32.1 (L) 34.0 - 46.6 %    MCV 97.9 (H) 79.0 - 97.0 fL    MCH 30.2 26.6 - 33.0 pg    MCHC 30.8 (L) 31.5 - 35.7 g/dL    RDW 14.8 12.3 - 15.4 %    RDW-SD 52.7 37.0 - 54.0 fl    MPV 9.6 6.0 - 12.0 fL    Platelets 234 140 - 450 10*3/mm3    Neutrophil % 84.2 (H) 42.7 - 76.0 %    Lymphocyte % 9.0 (L) 19.6 - 45.3 %    Monocyte % 5.0 5.0 - 12.0 %    Eosinophil % 0.9 0.3 - 6.2 %    Basophil % 0.2 0.0 - 1.5 %    Immature Grans % 0.7 (H) 0.0 - 0.5 %    Neutrophils, Absolute 11.03 (H) 1.70 - 7.00 10*3/mm3    Lymphocytes, Absolute 1.18 0.70 - 3.10 10*3/mm3    Monocytes, Absolute 0.66 0.10 - 0.90 10*3/mm3    Eosinophils, Absolute 0.12 0.00 - 0.40 10*3/mm3    Basophils, Absolute 0.03 0.00 - 0.20 10*3/mm3    Immature Grans, Absolute 0.09 (H) 0.00 - 0.05 10*3/mm3    nRBC 0.0 0.0 - 0.2 /100 WBC   Lactic Acid, Plasma    Specimen: Blood   Result Value Ref Range    Lactate 0.6 0.5 - 2.0 mmol/L   Urine Drug Screen - Urine, Clean Catch    Specimen: Urine, Clean Catch   Result Value Ref Range    THC, Screen, Urine Negative Negative    Phencyclidine (PCP), Urine Negative Negative    Cocaine Screen, Urine Negative Negative    Methamphetamine, Ur Negative Negative    Opiate Screen Positive (A) Negative    Amphetamine Screen, Urine Negative Negative    Benzodiazepine Screen, Urine Negative Negative    Tricyclic Antidepressants Screen Negative Negative    Methadone Screen, Urine Negative Negative    Barbiturates Screen, Urine Negative Negative    Oxycodone Screen, Urine Negative Negative    Propoxyphene Screen Negative Negative    Buprenorphine, Screen, Urine Negative Negative    Ethanol    Specimen: Blood   Result Value Ref Range    Ethanol <10 0 - 10 mg/dL    Ethanol % <0.010 %   Acetaminophen Level    Specimen: Blood   Result Value Ref Range    Acetaminophen <5.0 0.0 - 30.0 mcg/mL   Salicylate Level    Specimen: Blood   Result Value Ref Range    Salicylate <0.3 <=30.0 mg/dL   Troponin    Specimen: Blood   Result Value Ref Range    Troponin T 0.014 0.000 - 0.030 ng/mL   TSH    Specimen: Blood   Result Value Ref Range    TSH 1.050 0.270 - 4.200 uIU/mL   T4, Free    Specimen: Blood   Result Value Ref Range    Free T4 0.99 0.93 - 1.70 ng/dL   ECG 12 Lead   Result Value Ref Range    QT Interval 414 ms     CT Head Without Contrast    Result Date: 5/15/2021  CT Head WO: 5/15/2021 5:01 PM HISTORY: Altered mental status TECHNIQUE: Axial unenhanced head CT. Radiation dose reduction techniques included automated exposure control or exposure modulation based on body size. Radiation audit for number of CT and nuclear cardiology exams performed in the last year: 0.  COMPARISON: CT of the head from 3/6/2021 FINDINGS: Exam is limited by patient motion. No definite intracranial hemorrhage, mass, or infarct. No hydrocephalus or extra-axial fluid collection. The skull base, calvarium, and extracranial soft tissues are normal.     No definite acute intracranial abnormality. Exam is limited by extensive motion artifact. Signer Name: Judie Lara MD  Signed: 5/15/2021 4:16 PM  Workstation Name: [id number]  [facility]    CT Abdomen Pelvis With Contrast    Result Date: 5/15/2021  CT Abdomen Pelvis W INDICATION: Lower abdominal pain, altered mental status TECHNIQUE: CT of the abdomen and pelvis with IV contrast. Coronal and sagittal reconstructions were obtained.  Radiation dose reduction techniques included automated exposure control or exposure modulation based on body size. Count of known CT and cardiac nuc med studies performed in previous 12 months: 0. COMPARISON: CT of the abdomen  and pelvis from 3/7/2021 FINDINGS: Exam is limited by motion artifact. Abdomen: Chronic patchy changes are seen at the lung bases without definite focal consolidation/pneumonia. The liver, spleen, kidneys and pancreas appear unremarkable. There is a small fat-containing umbilical hernia. Pelvis: The urinary bladder is grossly distended. There is a prominent dextroscoliosis. No definite acute osseous abnormality. Mildly prominent lymph nodes are noted in the patient's groin bilaterally.     1. Exam is limited by motion artifact. There is no definite acute intra-abdominal abnormality. The urinary bladder is grossly distended. If the patient is not able to void spontaneously, consider catheterization. 2. Mildly prominent nodes are noted in the patient's groin bilaterally. These are of uncertain etiology and clinical significance. These may be reactive. Signer Name: Judie Lara MD  Signed: 5/15/2021 4:27 PM  Workstation Name: [id number]  [facility]    XR Chest 1 View    Result Date: 5/15/2021  CR Chest 1 Vw INDICATION: Altered mental status COMPARISON:  Chest x-ray from 3/6/2021 FINDINGS: Single portable AP view(s) of the chest.  The heart may be mildly enlarged.. Patchy bilateral airspace disease is again noted. No pneumothorax or acute osseous abnormality.     There is patchy bilateral airspace disease that does not appear definitely changed from prior. Findings may be seen with potential bilateral pneumonia or pulmonary edema. Signer Name: Judie Lara MD  Signed: 5/15/2021 4:14 PM  Workstation Name: [id number]  [facility]      Procedures      EKG           EKG time/Interp time: 1443/1445  Rhythm/Rate: Sinus rhythm rate of 74  P waves and AL: Normal P waves with normal AL interval  QRS, axis: Normal QRS duration within normal axis  ST and T waves: No ST elevations or depressions    Independently interpreted by me contemporaneously with treatment        ED Course  ED  Course as of May 15 1644   Sat May 15, 2021   1450 76-year-old female who it appears takes Namenda but looking at prior notes seems like she is usually alert and oriented to person place and time.  She presents today via EMS for altered mental status per .  On exam she will open her eyes to voice but then closes them she moves all extremities endorses normal sensation she is somewhat confused but knows she is at the hospital and knows that it may does not know what hospital does not know the day the week.  Seems drowsy.  On exam she seems to have normal heart sounds and normal lung sounds she has slightly tender lower abdomen is mildly distended.  Her pupils are about 3 to 4 mm and reactive.  White blood cell count which resulted prior to me getting to see her was elevated with elevated neutrophils and she does have a temperature of 100.4 rectally.  Will be working up for infectious cause of her mental status change.  I do not think she is having a stroke as she moves all of her extremities has normal sensation and though she is slightly confused her word process is normal, her understanding is normal she follows all directions.  We will also get a head CT and an abdominal CT because she has some tenderness on exam.  Given her drowsiness and her medication list listing morphine and the nursing staff finding a pill underneath her in the bed I will also give her a little bit of Narcan though I doubt that is going to help because her pupils are pinpoint.    [RO]   1530  provides extra history that since she woke up this morning she keeps her eyes shut but will open them to voice and that is abnormal for her.  He says there is no chance that she overdosed on her morphine because he administers her medications.  This fits in with the picture as she did not wake up more with the Narcan.  Lactates normal, troponins negative, ethanol is negative salicylate and acetaminophen are negative metabolic panel shows  creatinine of 1.3 which is in keeping with prior ones.  Also sending uds, tsh, t4    [RO]   1639 Chest x-ray with bilateral patchy airspace disease possibly pneumonia, given her a she was hypoxia on room air and this abnormal chest x-ray elevated white blood cell count and some altered mental status think she should be admitted and continue to be treated for pneumonia.  Dee with Dr. Loya who agrees to admit the patient.    [RO]      ED Course User Index  [RO] Seth Mancilla MD                                           [facility]    Final diagnoses:   Pneumonia of both lungs due to infectious organism, unspecified part of lung   Altered mental status, unspecified altered mental status type       ED Disposition  ED Disposition     ED Disposition Condition Comment    Decision to Admit  Level of Care: Med/Surg [1]   Diagnosis: Pneumonia of both lungs due to infectious organism, unspecified part of lung [[id number]]   Admitting Physician: JOSE LOYA [[id number]]   Certification: I Certify That Inpatient Hospital Services Are Medically Necessary For Greater Than 2 Midnights            No follow-up provider specified.       Medication List      No changes were made to your prescriptions during this visit.          Seth Mancilla MD  05/15/21 5549

## 2021-05-16 NOTE — THERAPY EVALUATION
Patient Name: Sandra Mane  : 1945    MRN: [id number]                              Today's Date: 2021       Admit Date: 5/15/2021    Visit Dx:     ICD-10-CM ICD-9-CM   1. Pneumonia of both lungs due to infectious organism, unspecified part of lung  J18.9 483.8   2. Altered mental status, unspecified altered mental status type  R41.82 780.97     Patient Active Problem List   Diagnosis   • Depression with anxiety   • Back pain   • Arthritis   • CKD (chronic kidney disease) stage 3, GFR 30-59 ml/min (CMS/[facility])   • Hyperlipidemia   • GERD (gastroesophageal reflux disease)   • Diplopia   • Memory changes   • Snoring   • Anemia   • Head injury   • Late onset Alzheimer's disease without behavioral disturbance (CMS/[facility])   • Ecchymosis of right eye   • Dependent edema   • OAB (overactive bladder)   • Chronic pain of left knee   • Primary osteoarthritis of left knee   • Greater trochanteric bursitis of both hips   • Routine health maintenance   • Non-healing ulcer of lower leg, left, limited to breakdown of skin (CMS/[facility])   • Bilateral hip pain   • Complete tear of left rotator cuff   • Primary osteoarthritis, left shoulder   • Urinary tract infection in female   • Arthritis of right knee   • History of bleeding ulcers   • Depression   • S/P total knee arthroplasty   • Pneumonia of both lungs due to infectious organism     Past Medical History:   Diagnosis Date   • Anxiety    • Arthritis    • Back pain     SEES DR. BERRY FOR THIS   • Depression    • Scoliosis      Past Surgical History:   Procedure Laterality Date   • COLONOSCOPY     • JOINT REPLACEMENT     • REPLACEMENT TOTAL KNEE Right    • SPINAL CORD STIMULATOR IMPLANT     • SPINAL CORD STIMULATOR IMPLANT       General Information     Row Name 21 1152          OT Time and Intention    Document Type  evaluation  -EN     Mode of Treatment  occupational therapy  -EN     Row Name 21 1152          General Information    Patient Profile Reviewed   yes  -EN     Prior Level of Function  ADL's;all household mobility spouse supervises when patient in shower for safety.  -EN     Existing Precautions/Restrictions  fall  -EN     Barriers to Rehab  previous functional deficit  -EN     Row Name 05/16/21 1152          Living Environment    Lives With  spouse  -EN     Row Name 05/16/21 1152          Home Main Entrance    Number of Stairs, Main Entrance  four  -EN     Stair Railings, Main Entrance  railings on both sides of stairs  -EN     Row Name 05/16/21 1152          Stairs Within Home, Primary    Stairs, Within Home, Primary  none  -EN     Row Name 05/16/21 1152          Cognition    Orientation Status (Cognition)  oriented x 3  -EN     Row Name 05/16/21 1152          Safety Issues, Functional Mobility    Safety Issues Affecting Function (Mobility)  other (see comments) history of severe back pain  -EN       User Key  (r) = Recorded By, (t) = Taken By, (c) = Cosigned By    Initials Name Provider Type    EN Judy Handley OTR Occupational Therapist          Mobility/ADL's     Row Name 05/16/21 1154          Bed Mobility    Bed Mobility  supine-sit  -EN     Supine-Sit [place] (Bed Mobility)  contact guard  -EN     Assistive Device (Bed Mobility)  head of bed elevated;bed rails  -EN     Comment (Bed Mobility)  extended time to complete  -EN     Row Name 05/16/21 1154          Transfers    Transfers  sit-stand transfer  -EN     Comment (Transfers)  verbal cues for hand placement  -EN     Sit-Stand [place] (Transfers)  contact guard;verbal cues  -EN     Row Name 05/16/21 1154          Sit-Stand Transfer    Assistive Device (Sit-Stand Transfers)  walker, front-wheeled  -EN     Row Name 05/16/21 1154          Functional Mobility    Functional Mobility- Ind. Level  contact guard assist  -EN     Functional Mobility- Device  rolling walker  -EN     Functional Mobility-Distance (Feet)  20  -EN     Functional Mobility- Comment  Patient required significant time  "to complete 20 ft during mobility assessment. When asked if this is her normal pace patient stated, \"Oh I don't know.\"  -EN     Row Name 05/16/21 1154          Activities of Daily Living    BADL Assessment/Intervention  lower body dressing  -EN     Row Name 05/16/21 1154          Lower Body Dressing Assessment/Training    Comment (Lower Body Dressing)  anticipate CGA for LB ADLs  -EN       User Key  (r) = Recorded By, (t) = Taken By, (c) = Cosigned By    Initials Name Provider Type    Judy Baron OTR Occupational Therapist        Obj/Interventions     Row Name 05/16/21 1157          Range of Motion Comprehensive    General Range of Motion  bilateral upper extremity ROM WFL  -EN     Row Name 05/16/21 1157          Strength Comprehensive (MMT)    Comment, General Manual Muscle Testing (MMT) Assessment  B shoulder strength 4/5, elbow/hands WFL  -EN     Row Name 05/16/21 1157          Balance    Balance Assessment  sitting static balance;standing static balance  -EN     Static Sitting Balance  WNL  -EN     Static Standing Balance  other (see comments) CGA  -EN     Comment, Balance  C/o back and foot pain during mobility,  -EN       User Key  (r) = Recorded By, (t) = Taken By, (c) = Cosigned By    Initials Name Provider Type    Judy Baron OTR Occupational Therapist        Goals/Plan     Row Name 05/16/21 1203          Transfer Goal 1 (OT)    Activity/Assistive Device (Transfer Goal 1, OT)  sit-to-stand/stand-to-sit;toilet;commode, bedside without drop arms;walker, rolling  -EN     [place] Level/Cues Needed (Transfer Goal 1, OT)  standby assist  -EN     Time Frame (Transfer Goal 1, OT)  by discharge  -EN     Progress/Outcome (Transfer Goal 1, OT)  other (see comments) new goal  -EN     Row Name 05/16/21 1203          Strength Goal 1 (OT)    Strength Goal 1 (OT)  Patient to perform dynamic standing activity with SBA/FWW for 2-3 minutes without LOB for improved safety during ADL routine.  -EN "     Time Frame (Strength Goal 1, OT)  by discharge  -EN     Progress/Outcome (Strength Goal 1, OT)  other (see comments) new goal  -EN     Row Name 05/16/21 1203          Therapy Assessment/Plan (OT)    Planned Therapy Interventions (OT)  BADL retraining;functional balance retraining;strengthening exercise;transfer/mobility retraining  -EN       User Key  (r) = Recorded By, (t) = Taken By, (c) = Cosigned By    Initials Name Provider Type    Judy Baron OTR Occupational Therapist        Clinical Impression     Row Name 05/16/21 1158          Pain Assessment    Additional Documentation  Pain Scale: Numbers Pre/Post-Treatment (Group)  -EN     Row Name 05/16/21 1152          Pain Scale: Numbers Pre/Post-Treatment    Pretreatment Pain Rating  -- at rest reported no pain, increased during mobility  -EN     Pain Location - Orientation  -- back and feet  -EN     Pre/Posttreatment Pain Comment  Patient stated she has a sore on her toe and stated she normally walks with tennis shoes but they are not here. Did not rate pain but stated at rest it was zero.  -EN     Pain Intervention(s)  Repositioned  -EN     Row Name 05/16/21 5124          Plan of Care Review    Plan of Care Reviewed With  patient  -EN     Outcome Summary  OT evaluation completed. Patient performed supine to sit with CGA and sit to stand transfers with CGA. Patient performed in room mobility(20 ft) with FWW and CGA and required extended time to complete. Patient reported initially she had no pain, but did report pain in feet and back during mobility. Patient CGA for LB ADLs.  Patient reported she plans to return home with spouse at discharge and is interested in home health OT/PT. OT to follow while in the hosptial to maximize ADL independence and strength.  -EN     Row Name 05/16/21 1156          Therapy Assessment/Plan (OT)    Rehab Potential (OT)  good, to achieve stated therapy goals  -EN     Criteria for Skilled Therapeutic Interventions Met  (OT)  yes;skilled treatment is necessary  -EN     Therapy Frequency (OT)  5 times/wk  -EN     Predicted Duration of Therapy Intervention (OT)  one week  -EN     Row Name 05/16/21 1158          Therapy Plan Review/Discharge Plan (OT)    Anticipated Discharge Disposition (OT)  home with assist;home with home health  -EN     Row Name 05/16/21 1158          Positioning and Restraints    Pre-Treatment Position  in bed  -EN     Post Treatment Position  chair  -EN       User Key  (r) = Recorded By, (t) = Taken By, (c) = Cosigned By    Initials Name Provider Type    Judy Baron OTR Occupational Therapist        Outcome Measures     Row Name 05/16/21 1205          How much help from another is currently needed...    Putting on and taking off regular lower body clothing?  3  -EN     Bathing (including washing, rinsing, and drying)  3  -EN     Toileting (which includes using toilet bed pan or urinal)  3  -EN     Putting on and taking off regular upper body clothing  4  -EN     Taking care of personal grooming (such as brushing teeth)  4  -EN     Eating meals  4  -EN     AM-PAC 6 Clicks Score (OT)  21  -EN     Row Name 05/16/21 1205          Functional Assessment    Outcome Measure Options  AM-PAC 6 Clicks Daily Activity (OT)  -EN       User Key  (r) = Recorded By, (t) = Taken By, (c) = Cosigned By    Initials Name Provider Type    Judy Baron OTR Occupational Therapist        Occupational Therapy Education                 Title: PT OT SLP Therapies (In Progress)     Topic: Occupational Therapy (In Progress)     Point: ADL training (Done)     Description:   Instruct learner(s) on proper safety adaptation and remediation techniques during self care or transfers.   Instruct in proper use of assistive devices.              Learning Progress Summary           Patient Acceptance, E, VU by DEB at 5/16/2021 1205    Comment: safety during mobility                   Point: Home exercise program (Not Started)      Description:   Instruct learner(s) on appropriate technique for monitoring, assisting and/or progressing therapeutic exercises/activities.              Learner Progress:  Not documented in this visit.                      User Key     Initials Effective Dates Name Provider Type Discipline    EN 07/05/20 -  Judy Handley OTR Occupational Therapist OT              OT Recommendation and Plan  Planned Therapy Interventions (OT): BADL retraining, functional balance retraining, strengthening exercise, transfer/mobility retraining  Therapy Frequency (OT): 5 times/wk  Plan of Care Review  Plan of Care Reviewed With: patient  Outcome Summary: OT evaluation completed. Patient performed supine to sit with CGA and sit to stand transfers with CGA. Patient performed in room mobility(20 ft) with FWW and CGA and required extended time to complete. Patient reported initially she had no pain, but did report pain in feet and back during mobility. Patient CGA for LB ADLs.  Patient reported she plans to return home with spouse at discharge and is interested in home health OT/PT. OT to follow while in the hosptial to maximize ADL independence and strength.     Time Calculation:   Time Calculation- OT     Row Name 05/16/21 1206             Time Calculation- OT    OT Start Time  0927  -EN      OT Stop Time  0952  -EN      OT Time Calculation (min)  25 min  -EN        User Key  (r) = Recorded By, (t) = Taken By, (c) = Cosigned By    Initials Name Provider Type    EN Judy Handley OTR Occupational Therapist        Therapy Charges for Today     Code Description Service Date Service Provider Modifiers Qty    [id number]  OT EVAL LOW COMPLEXITY 2 5/16/2021 Judy Handley OTR GO 1               SUKHJINDER Ramirez  5/16/2021

## 2021-05-16 NOTE — PLAN OF CARE
Goal Outcome Evaluation:  Plan of Care Reviewed With: patient     Outcome Summary: OT evaluation completed. Patient performed supine to sit with CGA and sit to stand transfers with CGA. Patient performed in room mobility(20 ft) with FWW and CGA and required extended time to complete. Patient reported initially she had no pain, but did report pain in feet and back during mobility. Patient CGA for LB ADLs.  Patient reported she plans to return home with spouse at discharge and is interested in home health OT/PT. OT to follow while in the hosptial to maximize ADL independence and strength.

## 2021-05-16 NOTE — THERAPY EVALUATION
Acute Care - Speech Language Pathology   Swallow Initial [place] [facility] [place]     Patient Name: Sandra Mane  : 1945  MRN: [id number]  Today's Date: 2021               Admit Date: 5/15/2021    Visit Dx:     ICD-10-CM ICD-9-CM   1. Pneumonia of both lungs due to infectious organism, unspecified part of lung  J18.9 483.8   2. Altered mental status, unspecified altered mental status type  R41.82 780.97     Patient Active Problem List   Diagnosis   • Depression with anxiety   • Back pain   • Arthritis   • CKD (chronic kidney disease) stage 3, GFR 30-59 ml/min (CMS/[facility])   • Hyperlipidemia   • GERD (gastroesophageal reflux disease)   • Diplopia   • Memory changes   • Snoring   • Anemia   • Head injury   • Late onset Alzheimer's disease without behavioral disturbance (CMS/[facility])   • Ecchymosis of right eye   • Dependent edema   • OAB (overactive bladder)   • Chronic pain of left knee   • Primary osteoarthritis of left knee   • Greater trochanteric bursitis of both hips   • Routine health maintenance   • Non-healing ulcer of lower leg, left, limited to breakdown of skin (CMS/[facility])   • Bilateral hip pain   • Complete tear of left rotator cuff   • Primary osteoarthritis, left shoulder   • Urinary tract infection in female   • Arthritis of right knee   • History of bleeding ulcers   • Depression   • S/P total knee arthroplasty   • Pneumonia of both lungs due to infectious organism     Past Medical History:   Diagnosis Date   • Anxiety    • Arthritis    • Back pain     SEES DR. BERRY FOR THIS   • Depression    • Scoliosis      Past Surgical History:   Procedure Laterality Date   • COLONOSCOPY     • JOINT REPLACEMENT     • REPLACEMENT TOTAL KNEE Right    • SPINAL CORD STIMULATOR IMPLANT     • SPINAL CORD STIMULATOR IMPLANT          SWALLOW EVALUATION (last 72 hours)      SLP Adult Swallow Evaluation     Row Name 21 0825       Rehab Evaluation    Document Type  evaluation  -AD    Total Evaluation Minutes,  SLP  25  -AD    Subjective Information  no complaints  -AD    Patient Observations  alert;cooperative;agree to therapy  -AD    Patient/Family/Caregiver Comments/Observations  Pt seen upright in bed. No family present.  -AD    Care Plan Review  evaluation/treatment results reviewed;care plan/treatment goals reviewed;risks/benefits reviewed;current/potential barriers reviewed;patient/other agree to care plan  -AD    Care Plan Review, Other Participant(s)  other (see comments) Elly QUEZADA  -AD    Patient Effort  good  -AD    Symptoms Noted During/After Treatment  none  -AD       General Information    Patient Profile Reviewed  yes  -AD    Pertinent History Of Current Problem  Pt is a 77 y/o female admitted with mental status changes and bilateral pneumonia. Concerns for overmedicated due to multiple sedating medications at home. Hx of dementia and recurrent pneumonia. Chronic back problems. Swallow eval ordered due to concern reported with swallowing intermittently.   -AD    Current Method of Nutrition  regular textures;thin liquids  -AD    Precautions/Limitations, Vision  WFL;for purposes of eval  -AD    Precautions/Limitations, Hearing  WFL;for purposes of eval  -AD    Prior Level of Function-Communication  cognitive-linguistic impairment Mild dementia  -AD    Prior Level of Function-Swallowing  no diet consistency restrictions  -AD    Plans/Goals Discussed with  patient;agreed upon  -AD    Barriers to Rehab  cognitive status  -AD    Patient's Goals for Discharge  return home  -AD    Family Goals for Discharge  other (see comments) no family present  -AD       Pain    Additional Documentation  -- no pain reported or indicated  -AD       Oral Motor Structure and Function    Oral Lesions or Structural Abnormalities and/or variants  nones  -AD    Dentition Assessment  natural, present and adequate  -AD    Secretion Management  WNL/WFL  -AD    Mucosal Quality  moist, healthy  -AD    Volitional Swallow  WFL  -AD     Volitional Cough  WFL  -AD       Oral Musculature and Cranial Nerve Assessment    Oral Motor General Assessment  WFL  -AD    Oral Motor, Comment  No deficits of the lips, tongue, palate, jaw or larynx noted.   -AD       General Eating/Swallowing Observations    Respiratory Support Currently in Use  room air  -AD    Eating/Swallowing Skills  self-fed;appropriate self-feeding skills observed  -AD    Positioning During Eating  upright in bed;other (see comments) near 90 degrees  -AD    Utensils Used  spoon;cup;straw  -AD    Consistencies Trialed  regular textures;mixed consistency;pureed;thin liquids  -AD       Respiratory    Respiratory Status  WFL;room air;during swallowing/eating  -AD       Clinical Swallow Eval    Oral Prep Phase  WFL  -AD    Oral Transit  WFL  -AD    Oral Residue  WFL  -AD    Pharyngeal Phase  no overt signs/symptoms of pharyngeal impairment  -AD    Esophageal Phase  unremarkable  -AD    Clinical Swallow Evaluation Summary  Pt presents with a functional appearing oropharyngeal swallow. No overt s/s of aspiration noted on trials of thins from cup and straw, mechanical soft mixed consistencies, puree or solids. Normal oral prep and transit. No oral residue. Pt did exhibit a throat clear on 3 occasions but not directly associated with po intake. Recommend to f/u with repeat clinical exam due to recurrent pneumonia. Some concerns for aspiration events if pt oversedated.   -AD       Clinical Impression    SLP Swallowing Diagnosis  functional oral phase;functional pharyngeal phase;R/O pharyngeal dysphagia  -AD    Functional Impact  no impact on function  -AD    Rehab Potential/Prognosis, Swallowing  good, to achieve stated therapy goals  -AD    Swallow Criteria for Skilled Therapeutic Interventions Met  demonstrates skilled criteria  -AD       Recommendations    Therapy Frequency (Swallow)  PRN  -AD    Predicted Duration Therapy Intervention (Days)  2 days  -AD    SLP Diet Recommendation  regular  textures;thin liquids  -AD    Recommended Diagnostics  reassess via clinical swallow evaluation  -AD    Recommended Precautions and Strategies  upright posture during/after eating;small bites of food and sips of liquid;general aspiration precautions  -AD    Oral Care Recommendations  Oral Care before breakfast, after meals and PRN;Toothbrush  -AD    SLP Rec. for Method of Medication Administration  meds whole;with thin liquids;as tolerated  -AD    Monitor for Signs of Aspiration  no;notify SLP if any concerns  -AD    Anticipated Discharge Disposition (SLP)  home with 24/7 care  -AD    Patient/Family Concerns, Anticipated Discharge Disposition (SLP)  No concern reported per patient. No family present at this time.  -AD       Swallow Goals (SLP)    Oral Nutrition/Hydration Goal Selection (SLP)  oral nutrition/hydration, SLP goal 1  -AD       Oral Nutrition/Hydration Goal 1 (SLP)    Oral Nutrition/Hydration Goal 1, SLP  Pt will tolerate a regular diet with thin liquids w/o clinical s/s of aspiration or complications such as aspiration pneumonia.   -AD    Time Frame (Oral Nutrition/Hydration Goal 1, SLP)  short term goal (STG);2 days  -AD    Barriers (Oral Nutrition/Hydration Goal 1, SLP)  no barriers  -AD    Progress/Outcomes (Oral Nutrition/Hydration Goal 1, SLP)  other (see comments) New Goal  -AD      User Key  (r) = Recorded By, (t) = Taken By, (c) = Cosigned By    Initials Name Effective Dates    Traci Mahajan, MS CCC-SLP 08/09/20 -           EDUCATION  The patient has been educated in the following areas:   Dysphagia (Swallowing Impairment). Pt demonstrates limited understanding due to dementia. Reinforcement needed.     SLP Recommendation and Plan  SLP Swallowing Diagnosis: functional oral phase, functional pharyngeal phase, R/O pharyngeal dysphagia  SLP Diet Recommendation: regular textures, thin liquids  Recommended Precautions and Strategies: upright posture during/after eating, small bites of food  and sips of liquid, general aspiration precautions  SLP Rec. for Method of Medication Administration: meds whole, with thin liquids, as tolerated  Monitor for Signs of Aspiration: no, notify SLP if any concerns  Recommended Diagnostics: reassess via clinical swallow evaluation  Swallow Criteria for Skilled Therapeutic Interventions Met: demonstrates skilled criteria  Anticipated Discharge Disposition (SLP): home with 24/7 care  Rehab Potential/Prognosis, Swallowing: good, to achieve stated therapy goals  Therapy Frequency (Swallow): PRN  Predicted Duration Therapy Intervention (Days): 2 days    Patient/Family Concerns, Anticipated Discharge Disposition (SLP): No concern reported per patient. No family present at this time.         Plan of Care Reviewed With: patient, other (see comments) (RN, Elly)  Outcome Summary: SLP: Pt presents with a functional appearing oropharyngeal swallow. No overt s/s of aspiration noted on trials of thins from cup and straw, mechanical soft mixed consistencies, puree or solids. Normal oral prep and transit. No oral residue. Pt did exhibit a throat clear on 3 occasions but not directly associated with po intake. Recommend to f/u with repeat clinical exam due to recurrent pneumonia. Some concerns for aspiration events if pt oversedated. Recommend to continue with a regular diet with thins, aspiration precautions, oral care before breakfast and after meals, upright for po intake.    SLP GOALS     Row Name 05/16/21 0825             Oral Nutrition/Hydration Goal 1 (SLP)    Oral Nutrition/Hydration Goal 1, SLP  Pt will tolerate a regular diet with thin liquids w/o clinical s/s of aspiration or complications such as aspiration pneumonia.   -AD      Time Frame (Oral Nutrition/Hydration Goal 1, SLP)  short term goal (STG);2 days  -AD      Barriers (Oral Nutrition/Hydration Goal 1, SLP)  no barriers  -AD      Progress/Outcomes (Oral Nutrition/Hydration Goal 1, SLP)  other (see comments) New Goal   -AD        User Key  (r) = Recorded By, (t) = Taken By, (c) = Cosigned By    Initials Name Provider Type    Traci Mahajan MS CCC-SLP Speech and Language Pathologist             Time Calculation:   Time Calculation- SLP     Row Name 05/16/21 [facility]             Time Calculation- SLP    SLP Start Time  0800  -AD      SLP Stop Time  0825  -AD      SLP Time Calculation (min)  25 min  -AD      Total Timed Code Minutes- SLP  0 minute(s)  -AD      SLP Non-Billable Time (min)  0 min  -AD      SLP Received On  05/16/21  -AD      SLP - Next Appointment  05/17/21  -AD         Untimed Charges    SLP Eval/Re-eval   ST Eval Oral Pharyng Swallow - 23204  -AD      [id number] Eval Oral Pharyng Swallow Minutes  25  -AD         Total Minutes    Untimed Charges Total Minutes  25  -AD       Total Minutes  25  -AD        User Key  (r) = Recorded By, (t) = Taken By, (c) = Cosigned By    Initials Name Provider Type    Traci Mahajan MS CCC-SLP Speech and Language Pathologist          Therapy Charges for Today     Code Description Service Date Service Provider Modifiers Qty    [id number]  ST EVAL ORAL PHARYNG SWALLOW 2 5/16/2021 Traci Don MS CCC-SLP GN 1               Traci Don MS CCC-SLP  5/16/2021

## 2021-05-16 NOTE — CASE MANAGEMENT/SOCIAL WORK
Discharge Planning Assessment  GUILHERME Barahona     Patient Name: Sandra Mane  MRN: [id number]  Today's Date: 5/16/2021    Admit Date: 5/15/2021    Discharge Needs Assessment     Row Name 05/16/21 1694       Living Environment    Lives With  spouse    Current Living Arrangements  home/apartment/condo    Primary Care Provided by  self    Provides Primary Care For  no one    Family Caregiver if Needed  spouse    Family Caregiver Names  Peter -     Quality of Family Relationships  involved;supportive;helpful    Able to Return to Prior Arrangements  yes       Resource/Environmental Concerns    Resource/Environmental Concerns  none    Transportation Concerns  car, none       Transition Planning    Patient/Family Anticipates Transition to  home with family    Patient/Family Anticipated Services at Transition  none    Transportation Anticipated  family or friend will provide       Discharge Needs Assessment    Readmission Within the Last 30 Days  no previous admission in last 30 days    Equipment Currently Used at Home  walker, rolling;wheelchair    Concerns to be Addressed  no discharge needs identified    Equipment Needed After Discharge  none    Provided Post Acute Provider List?  N/A    Provided Post Acute Provider Quality & Resource List?  N/A        Discharge Plan     Row Name 05/16/21 6021       Plan    Plan  plan home w     Patient/Family in Agreement with Plan  yes    Plan Comments  Spoke with patient at bedside, she is sitting up in recliner. Face sheet verified. Patient lives with her  in a home in [place]. She is fairly independent of ADLs but does not drive. Her  assists as needed and provides transportation. She states she has a rw and wc  that she uses as needed. She has used [facility] in the past and has not used inpatient rehab previously. She does not have a living will but would like information about creating one. Brochure provided.She uses [facility] [place] and denies issues  obtaining medications. She plans to return home with her  at [place]. CM # placed on white board, will follow for dc needs.        Continued Care and Services - Admitted Since 5/15/2021    Coordination has not been started for this encounter.     Selected Continued Care - Prior Encounters Includes selections from prior encounters from 2/14/2021 to 5/16/2021    Discharged on 3/8/2021 Admission date: 3/6/2021 - Discharge disposition: Home-Health Care [facility]    Home Medical Care     Service Provider Selected Services Address Phone Fax Patient Preferred    [facility] [address] [address] [phone] --                      Demographic Summary     Row Name 05/16/21 3195       General Information    Admission Type  inpatient    Arrived From  home    Referral Source  admission list    Reason for Consult  discharge planning    Preferred Language  English     Used During This Interaction  no       Contact Information    Permission Granted to Share Info With          Functional Status    No documentation.       Psychosocial    No documentation.       Abuse/Neglect    No documentation.       Legal    No documentation.       Substance Abuse    No documentation.       Patient Forms    No documentation.           Sarmad Whitehead RN

## 2021-05-16 NOTE — PLAN OF CARE
Goal Outcome Evaluation:  Plan of Care Reviewed With: patient     Outcome Summary: VS stable. O2 1L n/c. SR per telemetry. Continue IV antibiotics as ordered. Nasal swab and urine obtained per orders this shift. Left arm and legs remain d/i and wrapped. Vidal patent with clear yellow urine.

## 2021-05-16 NOTE — THERAPY EVALUATION
Patient Name: Sandra Mane  : 1945 Acute care initial evaluation   MRN: [id number]                              Today's Date: 2021       Admit Date: 5/15/2021    Visit Dx:     ICD-10-CM ICD-9-CM   1. Pneumonia of both lungs due to infectious organism, unspecified part of lung  J18.9 483.8   2. Altered mental status, unspecified altered mental status type  R41.82 780.97     Patient Active Problem List   Diagnosis   • Depression with anxiety   • Back pain   • Arthritis   • CKD (chronic kidney disease) stage 3, GFR 30-59 ml/min (CMS/[facility])   • Hyperlipidemia   • GERD (gastroesophageal reflux disease)   • Diplopia   • Memory changes   • Snoring   • Anemia   • Head injury   • Late onset Alzheimer's disease without behavioral disturbance (CMS/[facility])   • Ecchymosis of right eye   • Dependent edema   • OAB (overactive bladder)   • Chronic pain of left knee   • Primary osteoarthritis of left knee   • Greater trochanteric bursitis of both hips   • Routine health maintenance   • Non-healing ulcer of lower leg, left, limited to breakdown of skin (CMS/[facility])   • Bilateral hip pain   • Complete tear of left rotator cuff   • Primary osteoarthritis, left shoulder   • Urinary tract infection in female   • Arthritis of right knee   • History of bleeding ulcers   • Depression   • S/P total knee arthroplasty   • Pneumonia of both lungs due to infectious organism     Past Medical History:   Diagnosis Date   • Anxiety    • Arthritis    • Back pain     SEES DR. BERRY FOR THIS   • Depression    • Scoliosis      Past Surgical History:   Procedure Laterality Date   • COLONOSCOPY     • JOINT REPLACEMENT     • REPLACEMENT TOTAL KNEE Right    • SPINAL CORD STIMULATOR IMPLANT     • SPINAL CORD STIMULATOR IMPLANT       General Information     Row Name 21 1216          Physical Therapy Time and Intention    Document Type  evaluation  -LN     Mode of Treatment  physical therapy  -LN     Row Name 21 1216          General  Information    Patient Profile Reviewed  yes  -LN     Existing Precautions/Restrictions  fall  -LN     Barriers to Rehab  previous functional deficit  -LN     Row Name 05/16/21 1216          Living Environment    Lives With  spouse  -LN     Row Name 05/16/21 1216          Home Main Entrance    Number of Stairs, Main Entrance  four;five 4 or 5 per patient  -LN     Stair Railings, Main Entrance  railings on both sides of stairs  -LN     Row Name 05/16/21 1216          Stairs Within Home, Primary    Stairs, Within Home, Primary  none in home; has a basement but doesn't need to go down there.  -LN     Row Name 05/16/21 1216          Cognition    Orientation Status (Cognition)  oriented x 3  -LN     Row Name 05/16/21 1216          Safety Issues, Functional Mobility    Safety Issues Affecting Function (Mobility)  other (see comments) hx of mild dementia; hx of chronic back pain; patient reports hx of 2-3 falls in past year, but none recently. Patient with hx of mild dementia and admitted to hospital secondary to altered mental status. Recently saw Ortho for left knee and left shoulder pain and got a cortisone shot in left shoulder on 4/29/21.  -LN     Impairments Affecting Function (Mobility)  cognition;strength;endurance/activity tolerance;pain;other (see comments) c/o pain in her feet and in low back with mobility and ambulation  -LN     Cognitive Impairments, Mobility Safety/Performance  awareness, need for assistance;insight into deficits/self-awareness;safety precaution awareness;safety precaution follow-through;problem-solving/reasoning  -LN       User Key  (r) = Recorded By, (t) = Taken By, (c) = Cosigned By    Initials Name Provider Type    LN Lainey Wilkinson, PT Physical Therapist        Mobility     Row Name 05/16/21 1220          Bed Mobility    Bed Mobility  supine-sit  -LN     Supine-Sit [place] (Bed Mobility)  contact guard;verbal cues;nonverbal cues (demo/gesture)  -LN     Assistive Device (Bed  Mobility)  bed rails;head of bed elevated  -LN     Comment (Bed Mobility)  needed extended time to complete  -LN     Row Name 05/16/21 1220          Transfers    Comment (Transfers)  cueing for hand placment  -LN     Row Name 05/16/21 1220          Sit-Stand Transfer    Sit-Stand [place] (Transfers)  contact guard;verbal cues;nonverbal cues (demo/gesture)  -LN     Assistive Device (Sit-Stand Transfers)  walker, front-wheeled  -LN     Row Name 05/16/21 1220          Gait/Stairs (Locomotion)    [place] Level (Gait)  contact guard;verbal cues;nonverbal cues (demo/gesture)  -LN     Assistive Device (Gait)  walker, front-wheeled  -LN     Distance in Feet (Gait)  35 feet in room  -LN     Deviations/Abnormal Patterns (Gait)  manuel decreased;gait speed decreased;stride length decreased  -LN     Bilateral Gait Deviations  forward flexed posture  -LN     Comment (Gait/Stairs)  Patient ambulates with very slow gait speed with decreased B stride length with FWW.  No LOB noted; narrow FLORENCE. Patient needed a lot cueing for safe use/positioning of FWW; she initially had the walker too far out in front of her and then the rest of the time, she had it too close to her. Also needed cueing to take bigger steps.  -LN       User Key  (r) = Recorded By, (t) = Taken By, (c) = Cosigned By    Initials Name Provider Type    Lainey Prasad, PT Physical Therapist        Obj/Interventions     Row Name 05/16/21 1226          Range of Motion Comprehensive    General Range of Motion  bilateral lower extremity ROM WFL  -LN     [facility] Name 05/16/21 1226          Strength Comprehensive (MMT)    Comment, General Manual Muscle Testing (MMT) Assessment  B LE 3+-4/5. Generalized weakness noted.  -LN     Row Name 05/16/21 1226          Balance    Balance Assessment  sitting static balance;sitting dynamic balance;standing static balance;standing dynamic balance  -LN     Static Sitting Balance  WNL  -LN     Dynamic Sitting Balance  mild  impairment did lean back minimally when LE MMT done  -LN     Static Standing Balance  other (see comments) CGA and FWW  -LN     Dynamic Standing Balance  other (see comments) CGA and FWW  -LN       User Key  (r) = Recorded By, (t) = Taken By, (c) = Cosigned By    Initials Name Provider Type    Lainey Prasad, PT Physical Therapist        Goals/Plan     Row Name 05/16/21 1236          Bed Mobility Goal 1 (PT)    Activity/Assistive Device (Bed Mobility Goal 1, PT)  sit to supine/supine to sit  -LN     [place] Level/Cues Needed (Bed Mobility Goal 1, PT)  independent;modified independence  -LN     Time Frame (Bed Mobility Goal 1, PT)  5 days  -LN     Progress/Outcomes (Bed Mobility Goal 1, PT)  goal ongoing  -LN     Row Name 05/16/21 1236          Transfer Goal 1 (PT)    Activity/Assistive Device (Transfer Goal 1, PT)  sit-to-stand/stand-to-sit; with rolling walker or rollator  -LN     [place] Level/Cues Needed (Transfer Goal 1, PT)  standby assist  -LN     Time Frame (Transfer Goal 1, PT)  5 days  -LN     Progress/Outcome (Transfer Goal 1, PT)  goal ongoing  -LN     Row Name 05/16/21 1236          Gait Training Goal 1 (PT)    Activity/Assistive Device (Gait Training Goal 1, PT)  gait (walking locomotion);assistive device use;walker, rolling;other (see comments) or rollator  -LN     [place] Level (Gait Training Goal 1, PT)  standby assist      80 feet-LN     Time Frame (Gait Training Goal 1, PT)  5 days  -LN     Progress/Outcome (Gait Training Goal 1, PT)  goal ongoing    -LN     Row Name 05/16/21 123          Stairs Goal 1 (PT)    Activity/Assistive Device (Stairs Goal 1, PT)  stairs, all skills;ascending stairs;descending stairs;using handrail, left;using handrail, right  -LN     [place] Level/Cues Needed (Stairs Goal 1, PT)  contact guard assist  -LN     Number of Stairs (Stairs Goal 1, PT)  4-5 with B rails  -LN     Time Frame (Stairs Goal 1, PT)  5 days  -LN     Progress/Outcome  (Stairs Goal 1, PT)  goal ongoing  -LN     Row Name 05/16/21 1236          Patient Education Goal (PT)    Activity (Patient Education Goal, PT)  10 reps LE exercises.  -LN     [place]/Cues/Accuracy (Memory Goal 2, PT)  demonstrates adequately;independent;verbalizes understanding  -LN     Time Frame (Patient Education Goal, PT)  5 days  -LN     Progress/Outcome (Patient Education Goal, PT)  goal ongoing  -LN       User Key  (r) = Recorded By, (t) = Taken By, (c) = Cosigned By    Initials Name Provider Type    LN Lainey Wilkinson, PT Physical Therapist        Clinical Impression     Row Name 05/16/21 1228          Pain Scale: Numbers Pre/Post-Treatment    Pre/Posttreatment Pain Comment  Patient did not specifically rate pain but reports hx of chronic low back pain and c/o pain in her feet, but reports she normally walks with tennis shoes on and her shoes aren't here presently.  She also c/o right toe pain- reports has a sore on it- bandaid on.  Pain was a 0/10 at rest.  -LN     Pain Intervention(s)  Repositioned;Ambulation/increased activity  -LN     Row Name 05/16/21 1228          Plan of Care Review    Plan of Care Reviewed With  patient  -LN     Outcome Summary  PT evaluation completed this am. Patient performed supine to sit transfer with CGA and cueing; with bedrail and HOB elevated. She transferred sit to stand with FWW with CGA and cueing for hand placement. She ambulated 35 feet in room with FWW with CGA and cueing for proper positioning of walker and to increase her stride length. Patient reports she normally uses a rollator at home.  Patient ambulates with very slow gait speed with narrow FLOR[place] and takes small steps. She needed extended time to complete transfers and ambulation. Patient did report some pain in B feet and in her low back (has hx of chronic back pain). Plan is for patient to return home with spouse at discharge and she is interested in HH PT/OT. PT to follow patient daily while she  is in hospital to work on functional mobility and gait training with FWW and LE exercises/strengthening with HEP as tolerated. Recommend home with HH.  -LN     Row Name 05/16/21 1228          Positioning and Restraints    Pre-Treatment Position  in bed  -LN     Post Treatment Position  chair  -LN     In Chair  notified nsg;reclined;call light within reach;encouraged to call for assist;exit alarm on;legs elevated;heels elevated  -LN       User Key  (r) = Recorded By, (t) = Taken By, (c) = Cosigned By    Initials Name Provider Type    Lainey Prasad, PT Physical Therapist        Outcome Measures     Row Name 05/16/21 1238          How much help from another person do you currently need...    Turning from your back to your side while in flat bed without using bedrails?  4  -LN     Moving from lying on back to sitting on the side of a flat bed without bedrails?  3  -LN     Moving to and from a bed to a chair (including a wheelchair)?  3  -LN     Standing up from a chair using your arms (e.g., wheelchair, bedside chair)?  3  -LN     Climbing 3-5 steps with a railing?  2  -LN     To walk in hospital room?  3  -LN     AM-PAC 6 Clicks Score (PT)  18  -LN     Row Name 05/16/21 1238          Functional Assessment    Outcome Measure Options  AM-PAC 6 Clicks Basic Mobility (PT)  -LN       User Key  (r) = Recorded By, (t) = Taken By, (c) = Cosigned By    Initials Name Provider Type    Lainey Prasad, PT Physical Therapist        Physical Therapy Education                 Title: PT OT SLP Therapies (In Progress)     Topic: Physical Therapy (In Progress)     Point: Mobility training (Done)     Learning Progress Summary           Patient Acceptance, E,TB,D, VU,NR by ALON at 5/16/2021 1239    Comment: Education provided on functional mobility and gait with FWW. Needs reinforcement.                   Point: Home exercise program (Not Started)     Learner Progress:  Not documented in this visit.          Point:  Body mechanics (Done)     Learning Progress Summary           Patient Acceptance, E,TB,D, VU,NR by LN at 5/16/2021 1239    Comment: Education provided on functional mobility and gait with FWW. Needs reinforcement.                   Point: Precautions (Done)     Learning Progress Summary           Patient Acceptance, E,TB,D, VU,NR by LN at 5/16/2021 1239    Comment: Education provided on functional mobility and gait with FWW. Needs reinforcement.                               User Key     Initials Effective Dates Name Provider Type Discipline    ALON 08/09/20 -  Lainey Wilkinson, PT Physical Therapist PT              PT Recommendation and Plan  Planned Therapy Interventions (PT): bed mobility training, gait training, home exercise program, patient/family education, strengthening, stair training, transfer training  Plan of Care Reviewed With: patient  Outcome Summary: PT evaluation completed this am. Patient performed supine to sit transfer with CGA and cueing; with bedrail and HOB elevated. She transferred sit to stand with FWW with CGA and cueing for hand placement. She ambulated 35 feet in room with FWW with CGA and cueing for proper positioning of walker and to increase her stride length. Patient reports she normally uses a rollator at home.  Patient ambulates with very slow gait speed with narrow FLOR[place] and takes small steps. She needed extended time to complete transfers and ambulation. Patient did report some pain in B feet and in her low back (has hx of chronic back pain). Plan is for patient to return home with spouse at discharge and she is interested in HH PT/OT. PT to follow patient daily while she is in hospital to work on functional mobility and gait training with FWW and LE exercises/strengthening with HEP as tolerated. She does have 4-5 steps to enter home thru garage and would benefit from stair training prior to discharge home. Recommend home with HH.     Time Calculation:   PT Charges     Row Name  05/16/21 1242             Time Calculation    Start Time  0927  -LN      Stop Time  0952  -LN      Time Calculation (min)  25 min  -LN      PT Received On  05/16/21  -LN      PT - Next Appointment  05/17/21  -LN         Timed Charges    02053 - Gait Training Minutes   10  -LN         Untimed Charges    PT Eval/Re-eval Minutes  15  -LN         Total Minutes    Timed Charges Total Minutes  10  -LN      Untimed Charges Total Minutes  15  -LN       Total Minutes  25  -LN        User Key  (r) = Recorded By, (t) = Taken By, (c) = Cosigned By    Initials Name Provider Type    LN Lainey Wilkinson, PT Physical Therapist        Therapy Charges for Today     Code Description Service Date Service Provider Modifiers Qty    29612692710 HC GAIT TRAINING EA 15 MIN 5/16/2021 Lainey Wilkinson, PT GP 1    [id number] HC PT EVAL LOW COMPLEXITY 1 5/16/2021 Lainey Wilkinson, PT GP 1          PT G-Codes  Outcome Measure Options: AM-PAC 6 Clicks Basic Mobility (PT)  AM-PAC 6 Clicks Score (PT): 18  AM-PAC 6 Clicks Score (OT): 21    Lainey Wilkinson PT  5/16/2021

## 2021-05-16 NOTE — PLAN OF CARE
Goal Outcome Evaluation:  Plan of Care Reviewed With: patient  Progress: improving  Outcome Summary: Alert and oriented x2, tolerating ra, up with assist x1, ambulates with aid of walker, unna boots to ble intact, wound ostomy nurse consulted, f/c draining clear yellow urine to bsd, iv antibiotcs as ordered, no adverse reactions noted, call light within reach.

## 2021-05-16 NOTE — H&P
"[facility]   HISTORY AND PHYSICAL    Patient Name: Sandra Mane  : 1945  MRN: [id number]  Primary Care Physician:  Sriram Blanton MD  Date of admission: 5/15/2021    Subjective   Subjective     Chief Complaint: delirium    77 y/o F with PMH of dementia, anxiety was brought in to ED for confusion.   Per patient she does not know why she is here but feels \"sleepy\".  Per EMR,  said patient was not herself today and takes pain medication at home but her drowsiness did not respond to narcan.   Patient denies fever/chills, cough, sputum production.   Patient fell and has lesions in LE which are covered but denies pain currently.       Review of Systems   Constitutional: Negative.    HENT: Negative.    Eyes: Negative.    Respiratory: Negative.    Cardiovascular: Positive for leg swelling.   Gastrointestinal: Negative.    Endocrine: Negative.    Genitourinary: Negative.    Musculoskeletal: Negative.    Skin: Positive for wound.   Allergic/Immunologic: Negative.    Neurological:        Drowsiness   Hematological: Negative.    Psychiatric/Behavioral: Positive for confusion.        Personal History     Past Medical History:   Diagnosis Date   • Anxiety    • Arthritis    • Back pain     SEES DR. BERRY FOR THIS   • Depression    • Scoliosis        Past Surgical History:   Procedure Laterality Date   • COLONOSCOPY     • JOINT REPLACEMENT     • REPLACEMENT TOTAL KNEE Right    • SPINAL CORD STIMULATOR IMPLANT     • SPINAL CORD STIMULATOR IMPLANT         Family History: family history includes Breast cancer in her paternal aunt; COPD in her father; Cancer in her father. Otherwise pertinent FHx was reviewed and not pertinent to current issue.    Social History:  reports that she has never smoked. She has never used smokeless tobacco. She reports current drug use. Drug: Morphine. She reports that she does not drink alcohol.    Home Medications:  FLUoxetine, Morphine, cephalexin, clonazePAM, fluticasone, " furosemide, gabapentin, memantine, polyethylene glycol, and potassium chloride    Allergies:  No Known Allergies    Objective    Objective     Vitals:   Temp:  [99 °F (37.2 °C)-100.4 °F (38 °C)] 99 °F (37.2 °C)  Heart Rate:  [70-80] 73  Resp:  [17-18] 17  BP: ()/(48-66) 106/57  Flow (L/min):  [1-2] 1    Physical Exam  Vitals and nursing note reviewed.   Constitutional:       General: She is not in acute distress.     Appearance: Normal appearance. She is not ill-appearing, toxic-appearing or diaphoretic.   HENT:      Head: Normocephalic and atraumatic.      Nose: Nose normal. No congestion or rhinorrhea.      Mouth/Throat:      Mouth: Mucous membranes are dry.      Pharynx: Oropharynx is clear. No oropharyngeal exudate or posterior oropharyngeal erythema.   Eyes:      General: No scleral icterus.     Extraocular Movements: Extraocular movements intact.      Conjunctiva/sclera: Conjunctivae normal.      Pupils: Pupils are equal, round, and reactive to light.   Cardiovascular:      Rate and Rhythm: Normal rate and regular rhythm.      Pulses: Normal pulses.      Heart sounds: Normal heart sounds. No murmur heard.   No friction rub. No gallop.    Pulmonary:      Effort: Pulmonary effort is normal. No respiratory distress.      Breath sounds: Normal breath sounds. No wheezing or rales.   Chest:      Chest wall: No tenderness.   Abdominal:      General: Abdomen is flat. Bowel sounds are normal. There is no distension.      Palpations: Abdomen is soft.      Tenderness: There is no abdominal tenderness. There is no guarding.   Musculoskeletal:         General: Swelling present. Normal range of motion.      Cervical back: Normal range of motion and neck supple.      Right lower leg: Edema present.      Left lower leg: Edema present.   Skin:     General: Skin is warm.      Findings: Lesion present. No rash.   Neurological:      General: No focal deficit present.      Mental Status: She is alert and oriented to person,  place, and time.      Comments: Drowsy, responsive to tactile stimuli; multiple times during interview somnolent         Result Review    Result Review:  I have personally reviewed the results from the time of this admission to 5/15/2021 20:45 EDT and agree with these findings:  [x]  Laboratory  [x]  Microbiology  [x]  Radiology  [x]  EKG/Telemetry   [x]  Cardiology/Vascular   []  Pathology  [x]  Old records  []  Other:  Most notable findings include: fever, decreased mentation, abnormal CXR + elevated WBC    Assessment/Plan   Assessment / Plan     Brief Patient Summary:  Sandra Mane is a 76 y.o. female who is admitted for acute hypoxic respiratory failure 2/2 community acquired pneumonia. Noted to have lesions in LE from previous fall could also be a source of infection.     Active Hospital Problems:  Active Hospital Problems    Diagnosis    • Pneumonia of both lungs due to infectious organism      Plan:   -continue O2 supplementation to maintain sats >90%  -continue broad-spectrum Abx to cover MRSA as well since skin lesions in LE  -Blood cultures in progress, WBC elevated with left shift  -f/u procalcitonin, sputum culture, MRSA nares, legionella and strep Ag  -obtain ABG to evaluate for hypercapnia   -U/A wnl, Utox + opiates, however pupils are not pinpoint.     DVT prophylaxis:  Medical DVT prophylaxis orders are present.    CODE STATUS:    Level Of Support Discussed With: Patient  Code Status: CPR  Medical Interventions (Level of Support Prior to Arrest): Full    Admission Status:  I believe this patient meets med/surg status.    Electronically signed by Gabriela Reyes MD, 05/15/21, 8:45 PM EDT.

## 2021-05-16 NOTE — PLAN OF CARE
Goal Outcome Evaluation:  Plan of Care Reviewed With: patient     Outcome Summary: PT evaluation completed this am. Patient performed supine to sit transfer with CGA and cueing; with bedrail and HOB elevated. She transferred sit to stand with FWW with CGA and cueing for hand placement. She ambulated 35 feet in room with FWW with CGA and cueing for proper positioning of walker and to increase her stride length. Patient reports she normally uses a rollator at home.  Patient ambulates with very slow gait speed with narrow [place] and takes small steps. She needed extended time to complete transfers and ambulation. Patient did report some pain in B feet and in her low back (has hx of chronic back pain). Plan is for patient to return home with spouse at discharge and she is interested in HH PT/OT. PT to follow patient daily while she is in hospital to work on functional mobility and gait training with FWW and LE exercises/strengthening with HEP as tolerated. Recommend home with HH.

## 2021-05-16 NOTE — PLAN OF CARE
Goal Outcome Evaluation:  Plan of Care Reviewed With: patient, other (see comments) (RN, Elly)     Outcome Summary: SLP: Pt presents with a functional appearing oropharyngeal swallow. No overt s/s of aspiration noted on trials of thins from cup and straw, mechanical soft mixed consistencies, puree or solids. Normal oral prep and transit. No oral residue. Pt did exhibit a throat clear on 3 occasions but not directly associated with po intake. Recommend to f/u with repeat clinical exam due to recurrent pneumonia. Some concerns for aspiration events if pt oversedated. Recommend to continue with a regular diet with thins, aspiration precautions, oral care before breakfast and after meals, upright for po intake.

## 2021-05-17 PROBLEM — R41.82 ALTERED MENTAL STATUS: Status: ACTIVE | Noted: 2021-01-01

## 2021-05-17 NOTE — THERAPY TREATMENT NOTE
Acute Care - Occupational Therapy Treatment Note  GUILHERME Barahona     Patient Name: Sandra Mane  : 1945  MRN: [id number]  Today's Date: 2021             Admit Date: 5/15/2021       ICD-10-CM ICD-9-CM   1. Pneumonia of both lungs due to infectious organism, unspecified part of lung  J18.9 483.8   2. Altered mental status, unspecified altered mental status type  R41.82 780.97     Patient Active Problem List   Diagnosis   • Depression with anxiety   • Back pain   • Arthritis   • CKD (chronic kidney disease) stage 3, GFR 30-59 ml/min (CMS/HCC)   • Hyperlipidemia   • GERD (gastroesophageal reflux disease)   • Diplopia   • Memory changes   • Snoring   • Anemia   • Head injury   • Late onset Alzheimer's disease without behavioral disturbance (CMS/HCC)   • Ecchymosis of right eye   • Dependent edema   • OAB (overactive bladder)   • Chronic pain of left knee   • Primary osteoarthritis of left knee   • Greater trochanteric bursitis of both hips   • Routine health maintenance   • Non-healing ulcer of lower leg, left, limited to breakdown of skin (CMS/HCC)   • Bilateral hip pain   • Complete tear of left rotator cuff   • Primary osteoarthritis, left shoulder   • Urinary tract infection in female   • Arthritis of right knee   • History of bleeding ulcers   • Depression   • S/P total knee arthroplasty   • Pneumonia of both lungs due to infectious organism     Past Medical History:   Diagnosis Date   • Anxiety    • Arthritis    • Back pain     SEES DR. BERRY FOR THIS   • Depression    • Scoliosis      Past Surgical History:   Procedure Laterality Date   • COLONOSCOPY     • JOINT REPLACEMENT     • REPLACEMENT TOTAL KNEE Right    • SPINAL CORD STIMULATOR IMPLANT     • SPINAL CORD STIMULATOR IMPLANT              OT ASSESSMENT FLOWSHEET (last 12 hours)      OT Evaluation and Treatment     Row Name 21 1014                   OT Time and Intention    Subjective Information  complains of;pain  -EN        Document  Type  therapy note (daily note)  -EN        Mode of Treatment  occupational therapy  -EN        Patient Effort  adequate  -EN        Symptoms Noted During/After Treatment  increased pain  -EN           Cognition    Personal Safety Interventions  gait belt;nonskid shoes/slippers when out of bed  -EN           Pain Scale: Numbers Pre/Post-Treatment    Pretreatment Pain Rating  -- 8-9/10  -EN        Pain Intervention(s)  Repositioned  -EN           Bed Mobility    Supine-Sit [place] (Bed Mobility)  standby assist  -EN        Assistive Device (Bed Mobility)  bed rails;draw sheet  -EN        Comment (Bed Mobility)  extended time to complete  -EN           Functional Mobility    Functional Mobility- Ind. Level  standby assist  -EN        Functional Mobility- Device  rolling walker  -EN        Functional Mobility-Distance (Feet)  20  -EN        Functional Mobility- Comment  extended time to complete  -EN           Transfer Assessment/Treatment    Transfers  sit-stand transfer;stand-sit transfer  -EN        Comment (Transfers)  verbal cues for hand placement  -EN           Transfers    Sit-Stand [place] (Transfers)  standby assist;verbal cues  -EN        Stand-Sit [place] (Transfers)  verbal cues;standby assist  -EN           Sit-Stand Transfer    Assistive Device (Sit-Stand Transfers)  walker, front-wheeled  -EN           Stand-Sit Transfer    Assistive Device (Stand-Sit Transfers)  walker, front-wheeled  -EN           Wound 05/15/21 1737 Left anterior arm Skin Tear    Wound - Properties Group Placement Date: 05/15/21  -SM Placement Time: 1737 -SM Side: Left  -SM Orientation: anterior  -SM Location: arm  -SM Primary Wound Type: Skin tear  -SM    Retired Wound - Properties Group Date first assessed: 05/15/21  -SM Time first assessed: 1737 -SM Side: Left  -SM Location: arm  -SM Primary Wound Type: Skin tear  -SM       Plan of Care Review    Plan of Care Reviewed With  patient  -EN        Outcome Summary  OT-  Patient performed supine to sit with SBA and sit to stand transfers with verbal cues and SBA. Patient performed in room mobility with SBA with FWW X 20 and required extended time to complete. Patient educated on long handled adaptive equipment for decreased pain during ADL/IADL tasks.   -EN           Positioning and Restraints    Pre-Treatment Position  in bed  -EN        Post Treatment Position  chair  -EN        In Chair  reclined;encouraged to call for assist;call light within reach  -EN           Therapy Plan Review/Discharge Plan (OT)    Therapy Plan Review (OT)  care plan/treatment goals reviewed  -EN        Equipment Needs Upon Discharge (OT)  -- would benefit from LH AE due to back pain  -EN          User Key  (r) = Recorded By, (t) = Taken By, (c) = Cosigned By    Initials Name Effective Dates    Judy Baron OTR 07/05/20 -     Elly Slaughter RN 05/05/17 -          Occupational Therapy Education                 Title: PT OT SLP Therapies (In Progress)     Topic: Occupational Therapy (In Progress)     Point: ADL training (Done)     Description:   Instruct learner(s) on proper safety adaptation and remediation techniques during self care or transfers.   Instruct in proper use of assistive devices.              Learning Progress Summary           Patient Acceptance, E, VU by EN at 5/17/2021 1133    Comment: LH AE, safety during transfers    Acceptance, E, VU by DEB at 5/16/2021 1205    Comment: safety during mobility                   Point: Home exercise program (Not Started)     Description:   Instruct learner(s) on appropriate technique for monitoring, assisting and/or progressing therapeutic exercises/activities.              Learner Progress:  Not documented in this visit.                      User Key     Initials Effective Dates Name Provider Type Discipline    DEB 07/05/20 -  Judy Handley OTR Occupational Therapist OT                  OT Recommendation and Plan  Planned Therapy  Interventions (OT): BADL retraining, functional balance retraining, strengthening exercise, transfer/mobility retraining  Therapy Frequency (OT): 5 times/wk  Plan of Care Review  Plan of Care Reviewed With: patient  Outcome Summary: OT- Patient performed supine to sit with SBA and sit to stand transfers with verbal cues and SBA. Patient performed in room mobility with SBA with FWW X 20 and required extended time to complete. Patient educated on long handled adaptive equipment for decreased pain during ADL/IADL tasks.   Plan of Care Reviewed With: patient  Outcome Summary: OT- Patient performed supine to sit with SBA and sit to stand transfers with verbal cues and SBA. Patient performed in room mobility with SBA with FWW X 20 and required extended time to complete. Patient educated on long handled adaptive equipment for decreased pain during ADL/IADL tasks.     Outcome Measures     Row Name 05/17/21 1100             How much help from another is currently needed...    Putting on and taking off regular lower body clothing?  3  -EN      Bathing (including washing, rinsing, and drying)  3  -EN      Toileting (which includes using toilet bed pan or urinal)  3  -EN      Putting on and taking off regular upper body clothing  4  -EN      Taking care of personal grooming (such as brushing teeth)  4  -EN      Eating meals  4  -EN      AM-PAC 6 Clicks Score (OT)  21  -EN        User Key  (r) = Recorded By, (t) = Taken By, (c) = Cosigned By    Initials Name Provider Type    Judy Baron OTR Occupational Therapist          Time Calculation:   Time Calculation- OT     Row Name 05/17/21 1133             Time Calculation- OT    OT Start Time  1013  -EN      OT Stop Time  1033  -EN      OT Time Calculation (min)  20 min  -EN         Timed Charges    99170 - OT Therapeutic Activity Minutes  20  -EN         Total Minutes    Timed Charges Total Minutes  20  -EN       Total Minutes  20  -EN        User Key  (r) = Recorded  By, (t) = Taken By, (c) = Cosigned By    Initials Name Provider Type    EN Judy Handley OTR Occupational Therapist        Therapy Charges for Today     Code Description Service Date Service Provider Modifiers Qty    52337954677 HC OT EVAL LOW COMPLEXITY 2 5/16/2021 Judy Handley OTR GO 1    [id number]  OT THERAPEUTIC ACT EA 15 MIN 5/17/2021 Judy Handley OTR GO 1               SUKHJINDER Ramirez  5/17/2021

## 2021-05-17 NOTE — PLAN OF CARE
"Goal Outcome Evaluation:  Plan of Care Reviewed With: patient     Outcome Summary: Pt VS improving, cont. Spo2 monitor 95% on room air, continues tele, HR 57-60's. Pt continues unna boots BLE, wound RN to see in am, per report they are due to be changed Monday 5/17. Pt c/o generalized pain this am, pt reports she \"thinks she's not getting her normal pain medications because her she's aching more\" pt c/o headache and nausea this am. see emar, flowsheets, see MD note r/t PTA meds.  "

## 2021-05-17 NOTE — CASE MANAGEMENT/SOCIAL WORK
Continued Stay Note  GUILHERME Barahona     Patient Name: Sandra Mane  MRN: [id number]  Today's Date: 5/17/2021    Admit Date: 5/15/2021    Discharge Plan     Row Name 05/17/21 1402       Plan    Plan Comments  Spoke with Lydia at [facility] and they can accept the patient.        Discharge Codes    No documentation.       Expected Discharge Date and Time     Expected Discharge Date Expected Discharge Time    May 17, 2021             Keily Ambriz RN

## 2021-05-17 NOTE — NURSING NOTE
05/17/21 1341   Wound 05/15/21 1737 Left anterior arm Skin Tear   Placement Date/Time: 05/15/21 1737   Side: Left  Orientation: anterior  Location: arm  Primary Wound Type: Skin Tear   Dressing Appearance intact;dry;other (see comments)  (due to be changed 5/18)   Closure WILLIE   Base dressing in place, unable to visualize   Wound 05/17/21 1220 Right lower leg Traumatic   Placement Date/Time: 05/17/21 1220   Present on Hospital Admission: Yes  Side: Right  Orientation: lower  Location: leg  Primary Wound Type: Traumatic   Dressing Appearance dry;intact   Closure None   Base yellow;subcutaneous;red;moist   Red (%), Wound Tissue Color 40   Yellow (%), Wound Tissue Color 60   Periwound intact;dry;swelling;blanchable;redness   Periwound Temperature warm   Periwound Skin Turgor soft   Edges open   Wound Length (cm) 8 cm   Wound Width (cm) 3 cm   Wound Depth (cm) 0.2 cm   Drainage Characteristics/Odor serosanguineous;yellow   Drainage Amount small   Care, Wound cleansed with;sterile normal saline;other (see comments);sung boot  (hydrofera blue to wound, unna boot, coban)   Dressing Care dressing changed     CWON consult received. Patient is currently being followed in [facility] for RLE traumatic wound. She was last seen on Friday, 5/14 and was scheduled to return today. Her notes were reviewed from Friday's appointment and I discussed the situation with nurse in the [facility] and since she's inpatient, CWON will provide her care of Hydrofera Blue to RLE and unna boots to BLE.    LLE skin is dry and intact. Mild edema. Hemosiderin staining noted, consistent with chronic venous insufficiency. She was recently treated for cellulitis with keflex and today, the infection appears to have resolved.    RLE with a full thickness wound, measuring 8 cm x 3 cm x 0.2 cm. Base with moist yellow and red tissue. Wound appears stable. Small amt of serosanguinous/yellow exudate noted on old dressing when removed. Hydrofera Blue was fairly  "adhered to wound bed. Sterile saline was used to moisten and loosen. Mild swelling and hemosiderin staining present.    CWON removed unna boots and coban. Legs were washed with cleansing foam and water. Moisturizer applied. RLE wound cleansed with sterile saline. Hydrofera Blue dressing place over wound and unna boots followed by 4\" coban wraps were applied to BLE.    Patient tolerated well. She is to return to the [facility] this Friday, 5/21.    Patient also with a LUE skin tear. Wound currently dressed with appropriate skin tear treatment protocol and not due to be changed until tomorrow.    Of note, right heel has a linear area of dry, brown, intact skin. Patient denies pain to this area and does not recall seeing this before. It does not present as a pressure related injury to me, but exact etiology is unclear. Patient did have compression wraps stretched and pulled up to her ankles at initial assessment. Area could be r/t friction of wraps. Heel was moisturized well before placing new wraps. Education provided on leg elevation and offloading heels. She and her  verbalized understanding.    Discussed with DAMIEN Vallejo.  "

## 2021-05-17 NOTE — THERAPY DISCHARGE NOTE
Acute Care - Physical Therapy Treatment Note/Discharge  GUILHERME Barahona     Patient Name: Sandra Mane  : 1945  MRN: [id number]  Today's Date: 2021                Admit Date: 5/15/2021    Visit Dx:    ICD-10-CM ICD-9-CM   1. Pneumonia of both lungs due to infectious organism, unspecified part of lung  J18.9 483.8   2. Altered mental status, unspecified altered mental status type  R41.82 780.97     Patient Active Problem List   Diagnosis   • Depression with anxiety   • Back pain   • Arthritis   • CKD (chronic kidney disease) stage 3, GFR 30-59 ml/min (CMS/HCC)   • Hyperlipidemia   • GERD (gastroesophageal reflux disease)   • Diplopia   • Memory changes   • Snoring   • Anemia   • Head injury   • Late onset Alzheimer's disease without behavioral disturbance (CMS/[facility])   • Ecchymosis of right eye   • Dependent edema   • OAB (overactive bladder)   • Chronic pain of left knee   • Primary osteoarthritis of left knee   • Greater trochanteric bursitis of both hips   • Routine health maintenance   • Non-healing ulcer of lower leg, left, limited to breakdown of skin (CMS/HCC)   • Bilateral hip pain   • Complete tear of left rotator cuff   • Primary osteoarthritis, left shoulder   • Urinary tract infection in female   • Arthritis of right knee   • History of bleeding ulcers   • Depression   • S/P total knee arthroplasty   • Pneumonia of both lungs due to infectious organism     Past Medical History:   Diagnosis Date   • Anxiety    • Arthritis    • Back pain     SEES DR. BERRY FOR THIS   • Depression    • Scoliosis      Past Surgical History:   Procedure Laterality Date   • COLONOSCOPY     • JOINT REPLACEMENT     • REPLACEMENT TOTAL KNEE Right    • SPINAL CORD STIMULATOR IMPLANT     • SPINAL CORD STIMULATOR IMPLANT            PT Assessment (last 12 hours)      PT Evaluation and Treatment     Row Name 21 1200          Physical Therapy Time and Intention    Subjective Information  complains of;pain  -SP      Document Type  therapy note (daily note)  -SP     Mode of Treatment  physical therapy  -SP     Patient Effort  adequate  -SP     Symptoms Noted During/After Treatment  increased pain  -SP     Row Name             Wound 05/15/21 1737 Left anterior arm Skin Tear    Wound - Properties Group Placement Date: 05/15/21  -SM Placement Time: 1737 -SM Side: Left  -SM Orientation: anterior  -SM Location: arm  -SM Primary Wound Type: Skin tear  -SM    Retired Wound - Properties Group Date first assessed: 05/15/21  -SM Time first assessed: 1737  -SM Side: Left  -SM Location: arm  -SM Primary Wound Type: Skin tear  -SM    Row Name             Wound 05/17/21 1220 Right lower leg Traumatic    Wound - Properties Group Placement Date: 05/17/21  -LC Placement Time: 1220  -LC Present on Hospital Admission: Y  -LC Side: Right  -LC Orientation: lower  -LC Location: leg  -LC Primary Wound Type: Traumatic  -LC    Retired Wound - Properties Group Date first assessed: 05/17/21  -LC Time first assessed: 1220  -LC Present on Hospital Admission: Y  -LC Side: Right  -LC Location: leg  -LC Primary Wound Type: Traumatic  -LC    Row Name 05/17/21 1200          Plan of Care Review    Plan of Care Reviewed With  patient  -SP     Progress  improving  -SP     Outcome Summary  PT note;  Patient transfers supine/sit/stand with SBA and ambulates 20 feet with rolling walker with increased time required.   Recommend continued ambulation with nursing.  No further skilled PT need at this time.   -SP     Row Name 05/17/21 1200          Bed Mobility Goal 1 (PT)    Activity/Assistive Device (Bed Mobility Goal 1, PT)  sit to supine/supine to sit  -SP     [place] Level/Cues Needed (Bed Mobility Goal 1, PT)  modified independence  -SP     Time Frame (Bed Mobility Goal 1, PT)  5 days  -SP     Progress/Outcomes (Bed Mobility Goal 1, PT)  goal partially met SBA  -SP     Row Name 05/17/21 1200          Transfer Goal 1 (PT)    Activity/Assistive Device (Transfer  Goal 1, PT)  sit-to-stand/stand-to-sit  -SP     [place] Level/Cues Needed (Transfer Goal 1, PT)  standby assist  -SP     Progress/Outcome (Transfer Goal 1, PT)  goal met  -SP     Row Name 05/17/21 1200          Gait Training Goal 1 (PT)    Activity/Assistive Device (Gait Training Goal 1, PT)  gait (walking locomotion);assistive device use;walker, rolling;other (see comments) or rollator  -SP     [place] Level (Gait Training Goal 1, PT)  standby assist  -SP     Time Frame (Gait Training Goal 1, PT)  5 days  -SP     Progress/Outcome (Gait Training Goal 1, PT)  goal met  -SP     Row Name 05/17/21 1200          Positioning and Restraints    Pre-Treatment Position  in bed  -SP     Post Treatment Position  chair  -SP     In Chair  reclined;call light within reach;encouraged to call for assist;legs elevated  -SP     Row Name 05/17/21 1200          Progress Summary (PT)    Reason for Discharge (PT)  no further needs identified  -SP     Row Name 05/17/21 1200          Discharge Summary (PT)    Outcomes Achieved/Progress Made Upon Discharge (PT)  goals partially achieved prior to discharge  -SP     Discharge Summary Statement (PT)  Patient requires SBA for transfers and mobility.  She is at or near her baseline for mobility at this point.    -SP       User Key  (r) = Recorded By, (t) = Taken By, (c) = Cosigned By    Initials Name Provider Type    Adeline Darnell, PT Physical Therapist    Ankita Lantigua, RN Registered Nurse    Elly Slaughter RN Registered Nurse          Physical Therapy Education                 Title: PT OT SLP Therapies (In Progress)     Topic: Physical Therapy (Done)     Point: Mobility training (Done)     Learning Progress Summary           Patient Acceptance, E, VU,DU by SP at 5/17/2021 1236    Comment: safety with transfers and gait    Acceptance, E,TB,D, VU,NR by LN at 5/16/2021 1239    Comment: Education provided on functional mobility and gait with FWW. Needs  reinforcement.                   Point: Home exercise program (Done)     Learning Progress Summary           Patient Acceptance, E, VU,DU by SP at 5/17/2021 1236    Comment: safety with transfers and gait                   Point: Body mechanics (Done)     Learning Progress Summary           Patient Acceptance, E, VU,DU by SP at 5/17/2021 1236    Comment: safety with transfers and gait    Acceptance, E,TB,D, VU,NR by LN at 5/16/2021 1239    Comment: Education provided on functional mobility and gait with FWW. Needs reinforcement.                   Point: Precautions (Done)     Learning Progress Summary           Patient Acceptance, E, VU,DU by SP at 5/17/2021 1236    Comment: safety with transfers and gait    Acceptance, E,TB,D, VU,NR by LN at 5/16/2021 1239    Comment: Education provided on functional mobility and gait with FWW. Needs reinforcement.                               User Key     Initials Effective Dates Name Provider Type Discipline    SP 02/05/19 -  Adeline Chaudhary, PT Physical Therapist PT    ALON 08/09/20 -  Lainey Wilkinson, PT Physical Therapist PT                PT Recommendation and Plan  Anticipated Discharge Disposition (PT): home, home with assist  Plan of Care Reviewed With: patient  Progress: improving  Outcome Summary: PT note;  Patient transfers supine/sit/stand with SBA and ambulates 20 feet with rolling walker with increased time required.   Recommend continued ambulation with nursing.  No further skilled PT need at this time.     Outcome Measures     Row Name 05/17/21 1200 05/17/21 1100          How much help from another person do you currently need...    Turning from your back to your side while in flat bed without using bedrails?  4  -SP  --     Moving from lying on back to sitting on the side of a flat bed without bedrails?  4  -SP  --     Moving to and from a bed to a chair (including a wheelchair)?  4  -SP  --     Standing up from a chair using your arms (e.g.,  wheelchair, bedside chair)?  4  -SP  --     Climbing 3-5 steps with a railing?  2  -SP  --     To walk in hospital room?  4  -SP  --     AM-PAC 6 Clicks Score (PT)  22  -SP  --        How much help from another is currently needed...    Putting on and taking off regular lower body clothing?  --  3  -EN     Bathing (including washing, rinsing, and drying)  --  3  -EN     Toileting (which includes using toilet bed pan or urinal)  --  3  -EN     Putting on and taking off regular upper body clothing  --  4  -EN     Taking care of personal grooming (such as brushing teeth)  --  4  -EN     Eating meals  --  4  -EN     AM-PAC 6 Clicks Score (OT)  --  21  -EN        Functional Assessment    Outcome Measure Options  AM-PAC 6 Clicks Basic Mobility (PT)  -SP  --       User Key  (r) = Recorded By, (t) = Taken By, (c) = Cosigned By    Initials Name Provider Type    Adeline Darnell, PT Physical Therapist    Judy Baron, OTR Occupational Therapist           Time Calculation:   PT Charges     Row Name 05/17/21 1238             Time Calculation    Start Time  1013  -SP      Stop Time  1033  -SP      Time Calculation (min)  20 min  -SP         Timed Charges    37862 - PT Therapeutic Exercise Minutes  20  -SP         Total Minutes    Timed Charges Total Minutes  20  -SP       Total Minutes  20  -SP        User Key  (r) = Recorded By, (t) = Taken By, (c) = Cosigned By    Initials Name Provider Type    Adeline Darnell, PT Physical Therapist        Therapy Charges for Today     Code Description Service Date Service Provider Modifiers Qty    [id number]  PT THER PROC EA 15 MIN 5/17/2021 Adeline Chaudhary, PT GP 1          PT G-Codes  Outcome Measure Options: AM-PAC 6 Clicks Basic Mobility (PT)  AM-PAC 6 Clicks Score (PT): 22  AM-PAC 6 Clicks Score (OT): 21    PT Discharge Summary  Anticipated Discharge Disposition (PT): home, home with assist  Reason for Discharge: Maximum functional potential  achieved  Outcomes Achieved: Patient able to partially acheive established goals  Discharge Destination: Home, Home with assist    Adeline Chaudhary, PT  5/17/2021

## 2021-05-17 NOTE — TELEPHONE ENCOUNTER
OSCAR FROM [facility] IS CALLING TO GET ORDERS FOR HOME HEALTH HEALTH NURSING, OT, AND PT.       CALL BACK: [phone]

## 2021-05-17 NOTE — DISCHARGE SUMMARY
"Sandra Mane  1945  [id number]    Hospitalists Discharge Summary    Date of Admission: 5/15/2021  Date of Discharge:  5/17/2021    Primary Discharge Diagnoses:  1.  Encephalopathy due to medications  2.  Pneumonia    Secondary Discharge Diagnoses:  1.  Dementia  2.  Chronic Pain Syndrome w/ Narcotic Dependence  3.  Anxiety    History of Present Illness (taken from H&P):  75 y/o F with PMH of dementia, anxiety was brought in to ED for confusion.   Per patient she does not know why she is here but feels \"sleepy\".  Per EMR,  said patient was not herself today and takes pain medication at home but her drowsiness did not respond to narcan.   Patient denies fever/chills, cough, sputum production.   Patient fell and has lesions in LE which are covered but denies pain currently.     Hospital Course:  Ms. Mane was admitted to the Med/Surg unit.  Her home pain regimen was held although she did not respond to Narcan given in the ER.  However, when she went to CT, the exams were limited by motion so it appears she was beginning to improve.  Although the infiltrates on her CXR were not demonstrably changed from prior, her procalcitonin was in the \"treatment range\" so abx were continued.    On my exam, she was appropriate and at her baseline.  I discussed the risks of the medication she takes at home given her underlying dementia.  I did stop her Klonopin at discharge given the potential for polypharmacy.    PCP  Patient Care Team:  Sriram Blanton MD as PCP - General (Family Medicine)  Shad Anaya MD as Consulting Physician (Pain Medicine)    Consults:   Consults     No orders found from 4/16/2021 to 5/16/2021.          Operations and Procedures Performed:       CT Head Without Contrast    Result Date: 5/15/2021  Narrative: CT Head WO: 5/15/2021 5:01 PM HISTORY: Altered mental status TECHNIQUE: Axial unenhanced head CT. Radiation dose reduction techniques included automated exposure control or " exposure modulation based on body size. Radiation audit for number of CT and nuclear cardiology exams performed in the last year: 0.  COMPARISON: CT of the head from 3/6/2021 FINDINGS: Exam is limited by patient motion. No definite intracranial hemorrhage, mass, or infarct. No hydrocephalus or extra-axial fluid collection. The skull base, calvarium, and extracranial soft tissues are normal.     Impression: No definite acute intracranial abnormality. Exam is limited by extensive motion artifact. Signer Name: Judie Lara MD  Signed: 5/15/2021 4:16 PM  Workstation Name: [id number]  [facility]    CT Abdomen Pelvis With Contrast    Result Date: 5/15/2021  Narrative: CT Abdomen Pelvis W INDICATION: Lower abdominal pain, altered mental status TECHNIQUE: CT of the abdomen and pelvis with IV contrast. Coronal and sagittal reconstructions were obtained.  Radiation dose reduction techniques included automated exposure control or exposure modulation based on body size. Count of known CT and cardiac nuc med studies performed in previous 12 months: 0. COMPARISON: CT of the abdomen and pelvis from 3/7/2021 FINDINGS: Exam is limited by motion artifact. Abdomen: Chronic patchy changes are seen at the lung bases without definite focal consolidation/pneumonia. The liver, spleen, kidneys and pancreas appear unremarkable. There is a small fat-containing umbilical hernia. Pelvis: The urinary bladder is grossly distended. There is a prominent dextroscoliosis. No definite acute osseous abnormality. Mildly prominent lymph nodes are noted in the patient's groin bilaterally.     Impression: 1. Exam is limited by motion artifact. There is no definite acute intra-abdominal abnormality. The urinary bladder is grossly distended. If the patient is not able to void spontaneously, consider catheterization. 2. Mildly prominent nodes are noted in the patient's groin bilaterally. These are of uncertain etiology and clinical  significance. These may be reactive. Signer Name: Judie Lara MD  Signed: 5/15/2021 4:27 PM  Workstation Name: [id number]  [facility]    XR Chest 1 View    Result Date: 5/15/2021  Narrative: CR Chest 1 Vw INDICATION: Altered mental status COMPARISON:  Chest x-ray from 3/6/2021 FINDINGS: Single portable AP view(s) of the chest.  The heart may be mildly enlarged.. Patchy bilateral airspace disease is again noted. No pneumothorax or acute osseous abnormality.     Impression: There is patchy bilateral airspace disease that does not appear definitely changed from prior. Findings may be seen with potential bilateral pneumonia or pulmonary edema. Signer Name: Judie Lara MD  Signed: 5/15/2021 4:14 PM  Workstation Name: [id number]  [facility]      Allergies:  has No Known Allergies.    Charbel  reviewed    Discharge Medications:     Discharge Medications      New Medications      Instructions Start Date   cefdinir 300 MG capsule  Commonly known as: OMNICEF   300 mg, Oral, 2 Times Daily         Continue These Medications      Instructions Start Date   FLUoxetine 20 MG tablet  Commonly known as: PROzac   TAKE TWO TABLETS BY MOUTH DAILY      fluticasone 50 MCG/ACT nasal spray  Commonly known as: FLONASE   2 sprays, Nasal, Daily      furosemide 20 MG tablet  Commonly known as: LASIX   TAKE TWO TABLETS BY MOUTH DAILY      gabapentin 400 MG capsule  Commonly known as: NEURONTIN   400 mg, Oral, Every 4 Hours      memantine 10 MG tablet  Commonly known as: NAMENDA   TAKE ONE TABLET BY MOUTH DAILY      Morphine 30 MG tablet  Commonly known as: MSIR   60 mg, 3 Times Daily, Pt takes 2 po three times a day      polyethylene glycol 17 g packet  Commonly known as: MIRALAX   17 g, Oral, Daily PRN      potassium chloride 10 MEQ CR tablet   TAKE TWO TABLETS BY MOUTH DAILY         Stop These Medications    cephalexin 500 MG capsule  Commonly known as: KEFLEX     clonazePAM 0.5 MG  tablet  Commonly known as: KlonoPIN            Last Lab Results:   Lab Results (most recent)     Procedure Component Value Units Date/Time    Comprehensive Metabolic Panel [[id number]]  (Abnormal) Collected: 05/17/21 0454    Specimen: Blood Updated: 05/17/21 0608     Glucose 90 mg/dL      BUN 12 mg/dL      Creatinine 1.25 mg/dL      Sodium 137 mmol/L      Potassium 3.9 mmol/L      Chloride 102 mmol/L      CO2 23.1 mmol/L      Calcium 8.8 mg/dL      Total Protein 6.3 g/dL      Albumin 3.00 g/dL      ALT (SGPT) 8 U/L      AST (SGOT) 15 U/L      Alkaline Phosphatase 82 U/L      Total Bilirubin 0.5 mg/dL      eGFR Non African Amer 42 mL/min/1.73      Globulin 3.3 gm/dL      A/G Ratio 0.9 g/dL      BUN/Creatinine Ratio 9.6     Anion Gap 11.9 mmol/L     Narrative:      GFR Normal >60  Chronic Kidney Disease <60  Kidney Failure <15      Procalcitonin [[id number]]  (Abnormal) Collected: 05/17/21 0454    Specimen: Blood Updated: 05/17/21 0549     Procalcitonin 0.36 ng/mL     Narrative:      Results may be falsely decreased if patient taking Biotin.     CBC & Differential [[id number]]  (Abnormal) Collected: 05/17/21 0454    Specimen: Blood Updated: 05/17/21 0523    Narrative:      The following orders were created for panel order CBC & Differential.  Procedure                               Abnormality         Status                     ---------                               -----------         ------                     CBC Auto Differential[[id number]]        Abnormal            Final result                 Please view results for these tests on the individual orders.    CBC Auto Differential [[id number]]  (Abnormal) Collected: 05/17/21 0454    Specimen: Blood Updated: 05/17/21 0523     WBC 9.12 10*3/mm3      RBC 3.01 10*6/mm3      Hemoglobin 8.9 g/dL      Hematocrit 28.8 %      MCV 95.7 fL      MCH 29.6 pg      MCHC 30.9 g/dL      RDW 14.5 %      RDW-SD 50.7 fl      MPV 10.0 fL      Platelets 201 10*3/mm3      Neutrophil % 78.4  %      Lymphocyte % 13.2 %      Monocyte % 6.1 %      Eosinophil % 1.4 %      Basophil % 0.2 %      Immature Grans % 0.7 %      Neutrophils, Absolute 7.15 10*3/mm3      Lymphocytes, Absolute 1.20 10*3/mm3      Monocytes, Absolute 0.56 10*3/mm3      Eosinophils, Absolute 0.13 10*3/mm3      Basophils, Absolute 0.02 10*3/mm3      Immature Grans, Absolute 0.06 10*3/mm3      nRBC 0.0 /100 WBC     Blood Culture - Blood, Arm, Right [[id number]] Collected: 05/15/21 1713    Specimen: Blood from Arm, Right Updated: 05/16/21 1730     Blood Culture No growth at 24 hours    Blood Culture - Blood, Arm, Left [[id number]] Collected: 05/15/21 1653    Specimen: Blood from Arm, Left Updated: 05/16/21 1715     Blood Culture No growth at 24 hours    MRSA Screen Culture (Outpatient) - Swab, Nares [[id number]]  (Normal) Collected: 05/15/21 2151    Specimen: Swab from Nares Updated: 05/16/21 1507     MRSA Screen Cx No Methicillin Resistant Staphylococcus aureus isolated    Comprehensive Metabolic Panel [[id number]]  (Abnormal) Collected: 05/16/21 0341    Specimen: Blood Updated: 05/16/21 0438     Glucose 99 mg/dL      BUN 18 mg/dL      Creatinine 1.31 mg/dL      Sodium 139 mmol/L      Potassium 4.0 mmol/L      Chloride 105 mmol/L      CO2 26.0 mmol/L      Calcium 8.1 mg/dL      Total Protein 6.1 g/dL      Albumin 3.00 g/dL      ALT (SGPT) 8 U/L      AST (SGOT) 12 U/L      Alkaline Phosphatase 88 U/L      Total Bilirubin 0.4 mg/dL      eGFR Non African Amer 40 mL/min/1.73      Globulin 3.1 gm/dL      A/G Ratio 1.0 g/dL      BUN/Creatinine Ratio 13.7     Anion Gap 8.0 mmol/L     Narrative:      GFR Normal >60  Chronic Kidney Disease <60  Kidney Failure <15      CBC Auto Differential [[id number]]  (Abnormal) Collected: 05/16/21 0341    Specimen: Blood Updated: 05/16/21 0416     WBC 11.97 10*3/mm3      RBC 3.01 10*6/mm3      Hemoglobin 9.0 g/dL      Hematocrit 29.4 %      MCV 97.7 fL      MCH 29.9 pg      MCHC 30.6 g/dL      RDW 14.9 %      RDW-SD  52.7 fl      MPV 9.5 fL      Platelets 204 10*3/mm3      Neutrophil % 81.1 %      Lymphocyte % 11.3 %      Monocyte % 5.7 %      Eosinophil % 1.1 %      Basophil % 0.2 %      Immature Grans % 0.6 %      Neutrophils, Absolute 9.72 10*3/mm3      Lymphocytes, Absolute 1.35 10*3/mm3      Monocytes, Absolute 0.68 10*3/mm3      Eosinophils, Absolute 0.13 10*3/mm3      Basophils, Absolute 0.02 10*3/mm3      Immature Grans, Absolute 0.07 10*3/mm3      nRBC 0.0 /100 WBC     Procalcitonin [[id number]]  (Abnormal) Collected: 05/15/21 2214    Specimen: Blood Updated: 05/15/21 2247     Procalcitonin 0.53 ng/mL     Narrative:      Results may be falsely decreased if patient taking Biotin.     Legionella Antigen, Urine - Urine, Urine, Catheter [[id number]]  (Normal) Collected: 05/15/21 2218    Specimen: Urine, Catheter Updated: 05/15/21 2234     LEGIONELLA ANTIGEN, URINE Negative    S. Pneumo Ag Urine or CSF - Urine, Urine, Catheter [[id number]]  (Normal) Collected: 05/15/21 2218    Specimen: Urine, Catheter Updated: 05/15/21 2233     Strep Pneumo Ag Negative    Blood Gas, Arterial - [[id number]]  (Abnormal) Collected: 05/15/21 2112    Specimen: Arterial Blood Updated: 05/15/21 2118     Site Left Radial     Marco's Test Positive     pH, Arterial 7.407 pH units      pCO2, Arterial 45.7 mm Hg      Comment: 83 Value above reference range        pO2, Arterial 68.3 mm Hg      Comment: 84 Value below reference range        HCO3, Arterial 28.8 mmol/L      Comment: 83 Value above reference range        Base Excess, Arterial 3.6 mmol/L      Comment: 83 Value above reference range        O2 Saturation, Arterial 95.9 %      Hemoglobin, Blood Gas 9.6 g/dL      Temperature 37.0 C      Barometric Pressure for Blood Gas 743 mmHg      Modality Nasal Cannula     Flow Rate 1.0 lpm      Ventilator Mode NA     Collected by [id number]     Comment: Meter: [id number]     :  [id number]        pCO2, Temperature Corrected 45.7 mm Hg      pH, Temp  Corrected 7.407 pH Units      pO2, Temperature Corrected 68.3 mm Hg     T3, Free [[id number]]  (Abnormal) Collected: 05/15/21 1537    Specimen: Blood Updated: 05/15/21 1812     T3, Free 1.66 pg/mL     Narrative:      Results may be falsely increased if patient taking Biotin.      COVID PRE-OP / PRE-PROCEDURE SCREENING ORDER (NO ISOLATION) - Swab, Nasal Cavity [[id number]]  (Normal) Collected: 05/15/21 1625    Specimen: Swab from Nasal Cavity Updated: 05/15/21 1709    Narrative:      The following orders were created for panel order COVID PRE-OP / PRE-PROCEDURE SCREENING ORDER (NO ISOLATION) - Swab, Nasal Cavity.  Procedure                               Abnormality         Status                     ---------                               -----------         ------                     COVID-19,[facility] IN-BRETT...[[id number]]  Normal              Final result                 Please view results for these tests on the individual orders.    COVID-19,[facility] IN-HOUSE,Nasal Swab No Transport Media 3-4 HR TAT - Swab, Nasal Cavity [[id number]]  (Normal) Collected: 05/15/21 1625    Specimen: Swab from Nasal Cavity Updated: 05/15/21 1709     COVID19 Not Detected    Narrative:      Fact sheet for providers: https://www.fda.gov/media/811171/download     Fact sheet for patients: https://www.fda.gov/media/029060/download    Test performed by PCR.    Consider negative results in combination with clinical observations, patient history, and epidemiological information.    Urine Drug Screen - Urine, Clean Catch [[id number]]  (Abnormal) Collected: 05/15/21 1349    Specimen: Urine, Clean Catch Updated: 05/15/21 1639     THC, Screen, Urine Negative     Phencyclidine (PCP), Urine Negative     Cocaine Screen, Urine Negative     Methamphetamine, Ur Negative     Opiate Screen Positive     Amphetamine Screen, Urine Negative     Benzodiazepine Screen, Urine Negative     Tricyclic Antidepressants Screen Negative     Methadone Screen, Urine Negative      Barbiturates Screen, Urine Negative     Oxycodone Screen, Urine Negative     Propoxyphene Screen Negative     Buprenorphine, Screen, Urine Negative    Narrative:      Urine drug screen results are to be used for medical purposes only.  They are not to be used for legal purposes such as employment testing.  Negative results do not necessarily mean the complete absence of a subtance, but rather that the result is less than the cutoff for that substance.  Positive results are unconfirmed and considered Preliminary Positive.  [facility] does not automatically confirm Postitive Unconfirmed results.  The physician may request (order) an Unconfirmed Positive result to be sent out for confirmation.      Negative Thresholds for Drugs Screened:    THC screen, urine                          50 ng/ml  Phenycyclidine (PCP), urine                25 ng/ml  Cocaine screen, urine                     150 ng/ml  Methamphetamine, urine                    500 ng/ml  Opiate screen, urine                      100 ng/ml  Amphetamine screen, urine                 500 ng/ml  Benzodiazepine screen, urine              150 ng/ml  Tricyclic Antidepressants screen, urine   300 ng/ml  Methadone screen, urine                   200 ng/ml  Barbiturates screen, urine                200 ng/ml  Oxycodone screen, urine                   100 ng/ml  Propoxyphene screen, urine                300 ng/ml  Buprenorphine screen, urine                10 ng/ml    TSH [[id number]]  (Normal) Collected: 05/15/21 1349    Specimen: Blood Updated: 05/15/21 1605     TSH 1.050 uIU/mL     T4, Free [[id number]]  (Normal) Collected: 05/15/21 1349    Specimen: Blood Updated: 05/15/21 1605     Free T4 0.99 ng/dL     Narrative:      Results may be falsely increased if patient taking Biotin.      Troponin [[id number]]  (Normal) Collected: 05/15/21 1349    Specimen: Blood Updated: 05/15/21 1506     Troponin T 0.014 ng/mL     Narrative:      Troponin T Reference  Range:  <= 0.03 ng/mL-   Negative for AMI  >0.03 ng/mL-     Abnormal for myocardial necrosis.  Clinicians would have to utilize clinical acumen, EKG, Troponin and serial changes to determine if it is an Acute Myocardial Infarction or myocardial injury due to an underlying chronic condition.       Results may be falsely decreased if patient taking Biotin.      Ethanol [[id number]] Collected: 05/15/21 1349    Specimen: Blood Updated: 05/15/21 1505     Ethanol <10 mg/dL      Ethanol % <0.010 %     Acetaminophen Level [[id number]]  (Normal) Collected: 05/15/21 1349    Specimen: Blood Updated: 05/15/21 1505     Acetaminophen <5.0 mcg/mL     Salicylate Level [[id number]]  (Normal) Collected: 05/15/21 1349    Specimen: Blood Updated: 05/15/21 1505     Salicylate <0.3 mg/dL     Lactic Acid, Plasma [[id number]]  (Normal) Collected: 05/15/21 1440    Specimen: Blood Updated: 05/15/21 1503     Lactate 0.6 mmol/L     Urinalysis With Culture If Indicated - Urine, Catheter In/Out [[id number]]  (Normal) Collected: 05/15/21 1349    Specimen: Urine, Catheter In/Out Updated: 05/15/21 1408     Color, UA Yellow     Appearance, UA Clear     pH, UA 6.0     Specific Gravity, UA 1.015     Glucose, UA Negative     Ketones, UA Negative     Bilirubin, UA Negative     Blood, UA Negative     Protein, UA Negative     Leuk Esterase, UA Negative     Nitrite, UA Negative     Urobilinogen, UA 0.2 E.U./dL    Narrative:      Urine microscopic not indicated.    CBC & Differential [[id number]]  (Abnormal) Collected: 05/15/21 1349    Specimen: Blood Updated: 05/15/21 1357    Narrative:      The following orders were created for panel order CBC & Differential.  Procedure                               Abnormality         Status                     ---------                               -----------         ------                     CBC Auto Differential[[id number]]        Abnormal            Final result                 Please view results for these tests on the  individual orders.        Imaging Results (Most Recent)     Procedure Component Value Units Date/Time    CT Abdomen Pelvis With Contrast [[id number]] Collected: 05/15/21 1627     Updated: 05/15/21 1629    Narrative:      CT Abdomen Pelvis W    INDICATION:   Lower abdominal pain, altered mental status    TECHNIQUE:   CT of the abdomen and pelvis with IV contrast. Coronal and sagittal reconstructions were obtained.  Radiation dose reduction techniques included automated exposure control or exposure modulation based on body size. Count of known CT and cardiac nuc med  studies performed in previous 12 months: 0.     COMPARISON:   CT of the abdomen and pelvis from 3/7/2021     FINDINGS:  Exam is limited by motion artifact.  Abdomen: Chronic patchy changes are seen at the lung bases without definite focal consolidation/pneumonia. The liver, spleen, kidneys and pancreas appear unremarkable. There is a small fat-containing umbilical hernia.    Pelvis: The urinary bladder is grossly distended. There is a prominent dextroscoliosis. No definite acute osseous abnormality. Mildly prominent lymph nodes are noted in the patient's groin bilaterally.      Impression:      1. Exam is limited by motion artifact. There is no definite acute intra-abdominal abnormality. The urinary bladder is grossly distended. If the patient is not able to void spontaneously, consider catheterization.   2. Mildly prominent nodes are noted in the patient's groin bilaterally. These are of uncertain etiology and clinical significance. These may be reactive.          Signer Name: Judie Lara MD   Signed: 5/15/2021 4:27 PM   Workstation Name: [id number]    [facility]    CT Head Without Contrast [[id number]] Collected: 05/15/21 1616     Updated: 05/15/21 1618    Narrative:      CT Head WO: 5/15/2021 5:01 PM    HISTORY:   Altered mental status    TECHNIQUE:   Axial unenhanced head CT. Radiation dose reduction techniques included automated  exposure control or exposure modulation based on body size. Radiation audit for number of CT and nuclear cardiology exams performed in the last year: 0.      COMPARISON:   CT of the head from 3/6/2021    FINDINGS:   Exam is limited by patient motion. No definite intracranial hemorrhage, mass, or infarct. No hydrocephalus or extra-axial fluid collection. The skull base, calvarium, and extracranial soft tissues are normal.      Impression:      No definite acute intracranial abnormality. Exam is limited by extensive motion artifact.          Signer Name: Judie Lara MD   Signed: 5/15/2021 4:16 PM   Workstation Name: [id number]    [facility]    XR Chest 1 View [[id number]] Collected: 05/15/21 1614     Updated: 05/15/21 1616    Narrative:      CR Chest 1 Vw    INDICATION:   Altered mental status     COMPARISON:    Chest x-ray from 3/6/2021    FINDINGS:  Single portable AP view(s) of the chest.  The heart may be mildly enlarged.. Patchy bilateral airspace disease is again noted. No pneumothorax or acute osseous abnormality.      Impression:      There is patchy bilateral airspace disease that does not appear definitely changed from prior. Findings may be seen with potential bilateral pneumonia or pulmonary edema.    Signer Name: Judie Lara MD   Signed: 5/15/2021 4:14 PM   Workstation Name: [id number]    [facility]          PROCEDURES      Condition on Discharge:  Stable    Physical Exam at Discharge  Vital Signs  Temp:  [98.4 °F (36.9 °C)-99.4 °F (37.4 °C)] 98.7 °F (37.1 °C)  Heart Rate:  [58-67] 63  Resp:  [16-18] 18  BP: (141-182)/(74-86) 182/86    Physical Exam:  Physical Exam   Constitutional: Patient appears well-developed and well-nourished and in no acute distress   Cardiovascular: Regular rate, regular rhythm, S1 normal and S2 normal.  Exam reveals no gallop and no friction rub.  No murmur heard.  Pulmonary/Chest: Lungs are diminished to auscultation bilaterally. No  respiratory distress. No wheezes. No rhonchi. No rales.   Abdominal: Soft. Bowel sounds are normal. There is no tenderness.   Musculoskeletal: Normal Muscle tone  Extremities: No edema. No asymmetry.  Neurological: Cranial nerves II-XII are grossly intact with no focal deficits.    Discharge Disposition  Home    Visiting Nurse:    Yes     Home PT/OT:  Yes     Home Safety Evaluation:  Yes     DME  None    Discharge Diet:      Dietary Orders (From admission, onward)     Start     Ordered    05/15/21 1751  Diet Regular  Diet Effective Now     Question:  Diet Texture / Consistency  Answer:  Regular    05/15/21 1756                Activity at Discharge:  As tolerated    Follow-up Appointments  Future Appointments   Date Time Provider Department Center   5/17/2021  3:00 PM ROOM 854 [facility]   5/21/2021  2:30 PM ROOM 854 [facility]   6/3/2021  1:00 PM Sriram Blanton MD [facility]   6/29/2021  3:30 PM Adeline Vital APRN [facility]     Additional Instructions for the Follow-ups that You Need to Schedule     Discharge Follow-up with PCP   As directed       Currently Documented PCP:    Sriram Blanton MD    PCP Phone Number:    [phone]     Follow Up Details: 1-2 weeks               Test Results Pending at Discharge  Pending Labs     Order Current Status    Blood Culture - Blood, Arm, Left Preliminary result    Blood Culture - Blood, Arm, Right Preliminary result    MRSA Screen Culture (Outpatient) - Swab, Nares Preliminary result           Ottoniel Chang MD  05/17/21  13:28 EDT

## 2021-05-17 NOTE — CASE MANAGEMENT/SOCIAL WORK
I spoke with he patient with her permission.  The patient was sitting up in the chair watching TV.  We discussed her discharge and her need for PT/OT.  Pt is agreeable to home health and her preference is [facility].

## 2021-05-17 NOTE — OUTREACH NOTE
Prep Survey      Responses   [facility] [place] facility patient discharged from?  [place]   Is LACE score < 7 ?  No   Emergency Room discharge w/ pulse ox?  No   Eligibility  [facility]   Date of Admission  05/15/21   Date of Discharge  05/17/21   Discharge Disposition  Home-Health Care Svc   Discharge diagnosis  PNA, AMS   Does the patient have one of the following disease processes/diagnoses(primary or secondary)?  COPD/Pneumonia   Does the patient have Home health ordered?  Yes   What is the Home health agency?   [facility]   Is there a DME ordered?  No   Prep survey completed?  Yes          Dori Mcmanus RN

## 2021-05-17 NOTE — PLAN OF CARE
Goal Outcome Evaluation:  Plan of Care Reviewed With: patient  Progress: improving  Outcome Summary: PT note;  Patient transfers supine/sit/stand with SBA and ambulates 20 feet with rolling walker with increased time required.   Recommend continued ambulation with nursing.  No further skilled PT need at this time.

## 2021-05-17 NOTE — PLAN OF CARE
Goal Outcome Evaluation:  Plan of Care Reviewed With: patient     Outcome Summary: SLP: Pt demonstrating functional swallow with no clinical s/s of aspiration on regular diet with thin. No throat clearing, coughing or wet vocal quality. Recommend to continue with regular diet with thins, aspiration precautions and no further therapy indicated at this time.

## 2021-05-17 NOTE — NURSING NOTE
Vidal cath removed. Pt up to BR. Unsure amount voided. Bladder scan done. Fluids encouraged and provided

## 2021-05-17 NOTE — THERAPY DISCHARGE NOTE
Acute Care - Speech Language Pathology   Swallow Re-Evaluation/Discharge GUILHERME Barahona     Patient Name: Sandra Mane  : 1945  MRN: [id number]  Today's Date: 2021               Admit Date: 5/15/2021    Visit Dx:    ICD-10-CM ICD-9-CM   1. Pneumonia of both lungs due to infectious organism, unspecified part of lung  J18.9 483.8   2. Altered mental status, unspecified altered mental status type  R41.82 780.97     Patient Active Problem List   Diagnosis   • Depression with anxiety   • Back pain   • Arthritis   • CKD (chronic kidney disease) stage 3, GFR 30-59 ml/min (CMS/[facility])   • Hyperlipidemia   • GERD (gastroesophageal reflux disease)   • Diplopia   • Memory changes   • Snoring   • Anemia   • Head injury   • Late onset Alzheimer's disease without behavioral disturbance (CMS/[facility])   • Ecchymosis of right eye   • Dependent edema   • OAB (overactive bladder)   • Chronic pain of left knee   • Primary osteoarthritis of left knee   • Greater trochanteric bursitis of both hips   • Routine health maintenance   • Non-healing ulcer of lower leg, left, limited to breakdown of skin (CMS/HCC)   • Bilateral hip pain   • Complete tear of left rotator cuff   • Primary osteoarthritis, left shoulder   • Urinary tract infection in female   • Arthritis of right knee   • History of bleeding ulcers   • Depression   • S/P total knee arthroplasty   • Pneumonia of both lungs due to infectious organism     Past Medical History:   Diagnosis Date   • Anxiety    • Arthritis    • Back pain     SEES DR. BERRY FOR THIS   • Depression    • Scoliosis      Past Surgical History:   Procedure Laterality Date   • COLONOSCOPY     • JOINT REPLACEMENT     • REPLACEMENT TOTAL KNEE Right    • SPINAL CORD STIMULATOR IMPLANT     • SPINAL CORD STIMULATOR IMPLANT            SWALLOW EVALUATION (last 72 hours)      SLP Adult Swallow Evaluation     Row Name 21 0850 21 0825       Rehab Evaluation    Document Type  re-evaluation  -AD   evaluation  -AD    Total Evaluation Minutes, SLP  30  -AD  25  -AD    Subjective Information  complains of not having a good night  -AD  no complaints  -AD    Patient Observations  alert;cooperative;agree to therapy  -AD  alert;cooperative;agree to therapy  -AD    Patient/Family/Caregiver Comments/Observations  Pt seen upright in a chair with legs down.   -AD  Pt seen upright in bed. No family present.  -AD    Care Plan Review  care plan/treatment goals reviewed;risks/benefits reviewed;current/potential barriers reviewed;patient/other agree to care plan  -AD  evaluation/treatment results reviewed;care plan/treatment goals reviewed;risks/benefits reviewed;current/potential barriers reviewed;patient/other agree to care plan  -AD    Care Plan Review, Other Participant(s)  --  other (see comments) Elly QUEZADA  -JAMES    Patient Effort  good  -AD  good  -AD    Symptoms Noted During/After Treatment  none  -AD  none  -AD       General Information    Patient Profile Reviewed  yes  -AD  yes  -AD    Pertinent History Of Current Problem  No changes in history. Pt continues with improved mental status.   -AD  Pt is a 77 y/o female admitted with mental status changes and bilateral pneumonia. Concerns for overmedicated due to multiple sedating medications at home. Hx of dementia and recurrent pneumonia. Chronic back problems. Swallow eval ordered due to concern reported with swallowing intermittently.   -AD    Current Method of Nutrition  regular textures;thin liquids  -AD  regular textures;thin liquids  -AD    Precautions/Limitations, Vision  WFL;for purposes of eval  -AD  WFL;for purposes of eval  -AD    Precautions/Limitations, Hearing  WFL;for purposes of eval  -AD  WFL;for purposes of eval  -AD    Prior Level of Function-Communication  cognitive-linguistic impairment mild dementia  -AD  cognitive-linguistic impairment Mild dementia  -AD    Prior Level of Function-Swallowing  no diet consistency restrictions  -AD  no diet  consistency restrictions  -AD    Plans/Goals Discussed with  patient;agreed upon  -AD  patient;agreed upon  -AD    Barriers to Rehab  cognitive status  -AD  cognitive status  -AD    Patient's Goals for Discharge  return home  -AD  return home  -AD    Family Goals for Discharge  other (see comments) no family present  -AD  other (see comments) no family present  -AD       Pain    Additional Documentation  -- pain in legs reported to RN; level 8  -AD  -- no pain reported or indicated  -AD       Oral Motor Structure and Function    Oral Lesions or Structural Abnormalities and/or variants  none  -AD  nones  -AD    Dentition Assessment  natural, present and adequate  -AD  natural, present and adequate  -AD    Secretion Management  WNL/WFL  -AD  WNL/WFL  -AD    Mucosal Quality  moist, healthy  -AD  moist, healthy  -AD    Volitional Swallow  WFL  -AD  WFL  -AD    Volitional Cough  WFL  -AD  WFL  -AD       Oral Musculature and Cranial Nerve Assessment    Oral Motor General Assessment  WFL  -AD  WFL  -AD    Oral Motor, Comment  Note from prior evaluation, pt with jaw tremors at rest and with activity. No gross deficits of strength or ROM.  -AD  No deficits of the lips, tongue, palate, jaw or larynx noted.   -AD       General Eating/Swallowing Observations    Respiratory Support Currently in Use  room air  -AD  room air  -AD    Eating/Swallowing Skills  self-fed;appropriate self-feeding skills observed  -AD  self-fed;appropriate self-feeding skills observed  -AD    Positioning During Eating  upright in chair;upright 90 degree;other (see comments) pt very fidgety  -AD  upright in bed;other (see comments) near 90 degrees  -AD    Utensils Used  spoon;cup  -AD  spoon;cup;straw  -AD    Consistencies Trialed  regular textures;mixed consistency;thin liquids  -AD  regular textures;mixed consistency;pureed;thin liquids  -AD    Pre SpO2 (%)  94  -AD  --    Post SpO2 (%)  95  -AD  --       Respiratory    Respiratory Status  WFL;room  air;during swallowing/eating  -AD  WFL;room air;during swallowing/eating  -AD       Clinical Swallow Eval    Oral Prep Phase  WFL  -AD  WFL  -AD    Oral Transit  WFL  -AD  WFL  -AD    Oral Residue  WFL  -AD  WFL  -AD    Pharyngeal Phase  WFL  -AD  no overt signs/symptoms of pharyngeal impairment  -AD    Esophageal Phase  unremarkable  -AD  unremarkable  -AD    Clinical Swallow Evaluation Summary  Pt presents with a functional oropharyngeal swallow. No clinical s/s of aspiration on thins, mixed consistencies, whole soft and regular. No difficulty with mastication or oral transit. No oral residue. No s/s of aspiration including throat clearing, wet vocal quality or cough.   -AD  Pt presents with a functional appearing oropharyngeal swallow. No overt s/s of aspiration noted on trials of thins from cup and straw, mechanical soft mixed consistencies, puree or solids. Normal oral prep and transit. No oral residue. Pt did exhibit a throat clear on 3 occasions but not directly associated with po intake. Recommend to f/u with repeat clinical exam due to recurrent pneumonia. Some concerns for aspiration events if pt oversedated.   -AD       Clinical Impression    Daily Summary of Progress (SLP)  prepare for discharge;progress toward functional goals as expected  -AD  --    Barriers to Overall Progress (SLP)  no barriers  -AD  --    SLP Swallowing Diagnosis  swallow WFL;functional oral phase;functional pharyngeal phase  -AD  functional oral phase;functional pharyngeal phase;R/O pharyngeal dysphagia  -AD    Functional Impact  no impact on function  -AD  no impact on function  -AD    Rehab Potential/Prognosis, Swallowing  good, to achieve stated therapy goals  -AD  good, to achieve stated therapy goals  -AD    Swallow Criteria for Skilled Therapeutic Interventions Met  --  demonstrates skilled criteria  -AD    Plan for Continued Treatment (SLP)  No further therapy indicated at this time. Pt with functional oropharyngeal swallow  and no clinical s/s of aspiration.   -AD  --       Recommendations    Therapy Frequency (Swallow)  --  PRN  -AD    Predicted Duration Therapy Intervention (Days)  --  2 days  -AD    SLP Diet Recommendation  regular textures;thin liquids  -AD  regular textures;thin liquids  -AD    Recommended Diagnostics  --  reassess via clinical swallow evaluation  -AD    Recommended Precautions and Strategies  upright posture during/after eating;small bites of food and sips of liquid;general aspiration precautions  -AD  upright posture during/after eating;small bites of food and sips of liquid;general aspiration precautions  -AD    Oral Care Recommendations  Oral Care before breakfast, after meals and PRN;Toothbrush  -AD  Oral Care before breakfast, after meals and PRN;Toothbrush  -AD    SLP Rec. for Method of Medication Administration  meds whole;with thin liquids;as tolerated  -AD  meds whole;with thin liquids;as tolerated  -AD    Monitor for Signs of Aspiration  no;notify SLP if any concerns  -AD  no;notify SLP if any concerns  -AD    Anticipated Discharge Disposition (SLP)  home with 24/7 care  -AD  home with 24/7 care  -AD    Patient/Family Concerns, Anticipated Discharge Disposition (SLP)  Pt reports no concerns at this time. No family currently present.   -AD  No concern reported per patient. No family present at this time.  -AD       Swallow Goals (SLP)    Oral Nutrition/Hydration Goal Selection (SLP)  --  oral nutrition/hydration, SLP goal 1  -AD       Oral Nutrition/Hydration Goal 1 (SLP)    Oral Nutrition/Hydration Goal 1, SLP  Pt will tolerate a regular diet with thin liquids w/o clinical s/s of aspiration or complications such as aspiration pneumonia.   -AD  Pt will tolerate a regular diet with thin liquids w/o clinical s/s of aspiration or complications such as aspiration pneumonia.   -AD    Time Frame (Oral Nutrition/Hydration Goal 1, SLP)  short term goal (STG);2 days  -AD  short term goal (STG);2 days  -AD     Barriers (Oral Nutrition/Hydration Goal 1, SLP)  no barriers  -AD  no barriers  -AD    Progress/Outcomes (Oral Nutrition/Hydration Goal 1, SLP)  goal met  -AD  other (see comments) New Goal  -AD      User Key  (r) = Recorded By, (t) = Taken By, (c) = Cosigned By    Initials Name Effective Dates    AD Traci Don, MS CCC-SLP 08/09/20 -           EDUCATION  The patient has been educated in the following areas:   Dysphagia (Swallowing Impairment). Pt verbalizes understanding. May need reinforcement due to cognitive impairment.     SLP Recommendation and Plan  SLP Swallowing Diagnosis: swallow WFL, functional oral phase, functional pharyngeal phase  SLP Diet Recommendation: regular textures, thin liquids  Monitor for Signs of Aspiration: no, notify SLP if any concerns  Anticipated Discharge Disposition (SLP): home with [facility] care  Rehab Potential/Prognosis, Swallowing: good, to achieve stated therapy goals  Daily Summary of Progress (SLP): prepare for discharge, progress toward functional goals as expected  Plan for Continued Treatment (SLP): No further therapy indicated at this time. Pt with functional oropharyngeal swallow and no clinical s/s of aspiration.   Anticipated Discharge Disposition (SLP): home with [facility] care  Patient/Family Concerns, Anticipated Discharge Disposition (SLP): Pt reports no concerns at this time. No family currently present.      Reason for Discharge: all goals and outcomes met, no further needs identified    Plan of Care Reviewed With: patient  Outcome Summary: SLP: Pt demonstrating functional swallow with no clinical s/s of aspiration on regular diet with thin. No throat clearing, coughing or wet vocal quality. Recommend to continue with regular diet with thins, aspiration precautions and no further therapy indicated at this time.    SLP GOALS     Row Name 05/17/21 0850 05/16/21 0825          Oral Nutrition/Hydration Goal 1 (SLP)    Oral Nutrition/Hydration Goal 1, SLP  Pt will  tolerate a regular diet with thin liquids w/o clinical s/s of aspiration or complications such as aspiration pneumonia.   -AD  Pt will tolerate a regular diet with thin liquids w/o clinical s/s of aspiration or complications such as aspiration pneumonia.   -AD     Time Frame (Oral Nutrition/Hydration Goal 1, SLP)  short term goal (STG);2 days  -AD  short term goal (STG);2 days  -AD     Barriers (Oral Nutrition/Hydration Goal 1, SLP)  no barriers  -AD  no barriers  -AD     Progress/Outcomes (Oral Nutrition/Hydration Goal 1, SLP)  goal met  -AD  other (see comments) New Goal  -AD       User Key  (r) = Recorded By, (t) = Taken By, (c) = Cosigned By    Initials Name Provider Type    Traci Mahajan MS CCC-SLP Speech and Language Pathologist               Time Calculation:   Time Calculation- SLP     Row Name 05/17/21 0933             Time Calculation- SLP    SLP Start Time  0820  -AD      SLP Stop Time  0850  -AD      SLP Time Calculation (min)  30 min  -AD      Total Timed Code Minutes- SLP  0 minute(s)  -AD      SLP Non-Billable Time (min)  0 min  -AD      SLP Received On  05/17/21  -AD         Untimed Charges    24469-TU Treatment Swallow Minutes  30  -AD         Total Minutes    Untimed Charges Total Minutes  30  -AD       Total Minutes  30  -AD        User Key  (r) = Recorded By, (t) = Taken By, (c) = Cosigned By    Initials Name Provider Type    Traci Mahajan MS CCC-SLP Speech and Language Pathologist          Therapy Charges for Today     Code Description Service Date Service Provider Modifiers Qty    77392081865 HC ST EVAL ORAL PHARYNG SWALLOW 2 5/16/2021 Traci Don MS CCC-SLP GN 1    [id number] HC ST TREATMENT SWALLOW 2 5/17/2021 Traci Don MS CCC-SLP GN 1            SLP Discharge Summary  Anticipated Discharge Disposition (SLP): home with 24/7 care  Reason for Discharge: all goals and outcomes met, no further needs identified  Progress Toward Achieving Short/long Term Goals:  all goals met within established timelines    Traci Don, MS CCC-SLP  5/17/2021

## 2021-05-17 NOTE — PLAN OF CARE
Goal Outcome Evaluation:  Plan of Care Reviewed With: patient     Outcome Summary: OT- Patient performed supine to sit with SBA and sit to stand transfers with verbal cues and SBA. Patient performed in room mobility with SBA with FWW X 20 and required extended time to complete. Patient educated on long handled adaptive equipment for decreased pain during ADL/IADL tasks.

## 2021-05-18 NOTE — OUTREACH NOTE
Call Center TCM Note      Responses   [facility] patient discharged from?  [place]   Does the patient have one of the following disease processes/diagnoses(primary or secondary)?  COPD/Pneumonia   Was the primary reason for admission:  Pneumonia   TCM attempt successful?  Yes   Call start time  1016   Call end time  1020   Discharge diagnosis  PNA, AMS   Person spoke with today (if not patient) and relationship  Patient   Meds reviewed with patient/caregiver?  Yes   Is the patient having any side effects they believe may be caused by any medication additions or changes?  No   Does the patient have all medications ordered at discharge?  Yes   Is the patient taking all medications as directed (includes completed medication regime)?  Yes   Does the patient have a primary care provider?   Yes   Does the patient have an appointment with their PCP or specialist within 7 days of discharge?  Yes   Has the patient kept scheduled appointments due by today?  N/A   What is the Home health agency?   [facility]   Has home health visited the patient within 72 hours of discharge?  No   Pulse Ox monitoring  None   Psychosocial issues?  No   Did the patient receive a copy of their discharge instructions?  Yes   Nursing interventions  Reviewed instructions with patient   What is the patient's perception of their health status since discharge?  Improving   Nursing Interventions  Nurse provided patient education   Are the patient's immunizations up to date?   Yes   Nursing interventions  Educated on importance of maintaining up to date immunizations as advised by provider   If the patient is a current smoker, are they able to teach back resources for cessation?  Not a smoker   Is the patient/caregiver able to teach back the hierarchy of who to call/visit for symptoms/problems? PCP, Specialist, Home health nurse, Urgent Care, ED, 911  Yes   Is the patient/caregiver able to teach back signs and symptoms of worsening condition:   Fever/chills, Shortness of breath, Chest pain   Is the patient/caregiver able to teach back importance of completing antibiotic course of treatment?  Yes   TCM call completed?  Yes   Wrap up additional comments  Pt states she is doing better. Patient acknowledge PCP [facility] fu appt on 5/24/21. No quesitons/concerns.          Patience Hollins RN    5/18/2021, 10:21 EDT

## 2021-05-18 NOTE — OUTREACH NOTE
Call Center TCM Note      Responses   [facility] patient discharged from?  [place]   Does the patient have one of the following disease processes/diagnoses(primary or secondary)?  COPD/Pneumonia   Was the primary reason for admission:  Pneumonia   TCM attempt successful?  No [Peter-spouse]   Unsuccessful attempts  Attempt 1   Does the patient have a primary care provider?   Yes   Does the patient have an appointment with their PCP or specialist within 7 days of discharge?  Yes   Comments regarding PCP  hospital fu appt on 5/24/21 at 2:30   Has the patient kept scheduled appointments due by today?  N/A          Patience Hollins RN    5/18/2021, 08:59 EDT

## 2021-05-19 NOTE — CASE MANAGEMENT/SOCIAL WORK
Case Management Discharge Note      Final Note: Discharged home with [facility].    Provided Post Acute Provider List?: N/A  Provided Post Acute Provider Quality & Resource List?: N/A    Selected Continued Care - Discharged on 5/17/2021 Admission date: 5/15/2021 - Discharge disposition: [place] [facility]    Destination    No services have been selected for the patient.              Durable Medical Equipment    No services have been selected for the patient.              Dialysis/Infusion    No services have been selected for the patient.              Home Medical Care Coordination complete    Service Provider Selected Services Address Phone Fax Patient Preferred    [facility] [address], [facility] [address] [phone] --          Therapy    No services have been selected for the patient.              Community Resources    No services have been selected for the patient.                Selected Continued Care - Prior Encounters Includes selections from prior encounters from 2/14/2021 to 5/17/2021    Discharged on 3/8/2021 Admission date: 3/6/2021 - Discharge disposition: [place]Health [place] [facility]    Home Medical Care     Service Provider Selected Services Address Phone Fax Patient Preferred    [facility] [address] [facility] [address], [facility] [address] [phone] --                         Final Discharge Disposition Code: 06 - home with home health care

## 2021-05-19 NOTE — TELEPHONE ENCOUNTER
Caller: Juni Martinez    Relationship to patient: Emergency Contact    Best call back number: [phone]*    Patient is needing: PATIENT'S , JUNI, CALLED TO RESCHEDULE HOSPITAL FOLLOW UP FOR THE PATIENT DUE TO AN APPT CONFLICT. THIS WOULD PUT THE PATIENT COMING IN BEYOND THE 7 DAYS AFTER DISCHARGE. THE PATIENT'S  IS NEEDING A CALLBACK TO DISCUSS AND RESCHEDULE WITH DR. DE LA TORRE.  ATTEMPTED TO WARM TRANSFER X2, NO ANSWER.

## 2021-05-20 NOTE — PROGRESS NOTES
Subjective   Sandra Mane is a 76 y.o. female presenting today for follow up of   Chief Complaint   Patient presents with   • Pneumonia     d/c hosp 5/17/21   • Altered Mental Status       History of Present Illness     Pt presents for hospital follow-up.  She was hospitalized 5/15-5/17/21 after being taken to the emergency room with delirium.  She was found to have bilateral pneumonia and was started on antibiotics.  She returned to baseline over the next couple of days and was discharged on oral cefdinir.  She reports that she is feeling fine.  Denies chest pain, shortness of breath, cough.    She is still seeing wound care for her shin wound and has bilateral Unna boots.    Patient Active Problem List   Diagnosis   • Depression with anxiety   • Back pain   • Arthritis   • CKD (chronic kidney disease) stage 3, GFR 30-59 ml/min (CMS/HCC)   • Hyperlipidemia   • GERD (gastroesophageal reflux disease)   • Diplopia   • Memory changes   • Snoring   • Anemia   • Head injury   • Late onset Alzheimer's disease without behavioral disturbance (CMS/HCC)   • Ecchymosis of right eye   • Dependent edema   • OAB (overactive bladder)   • Chronic pain of left knee   • Primary osteoarthritis of left knee   • Greater trochanteric bursitis of both hips   • Routine health maintenance   • Non-healing ulcer of lower leg, left, limited to breakdown of skin (CMS/HCC)   • Bilateral hip pain   • Complete tear of left rotator cuff   • Primary osteoarthritis, left shoulder   • Urinary tract infection in female   • Arthritis of right knee   • History of bleeding ulcers   • Depression   • S/P total knee arthroplasty   • Pneumonia of both lungs due to infectious organism   • Altered mental status       Current Outpatient Medications on File Prior to Visit   Medication Sig   • cefdinir (OMNICEF) 300 MG capsule Take 1 capsule by mouth 2 (Two) Times a Day for 5 days.   • FLUoxetine (PROzac) 20 MG tablet TAKE TWO TABLETS BY MOUTH DAILY   •  "fluticasone (FLONASE) 50 MCG/ACT nasal spray 2 sprays into the nostril(s) as directed by provider Daily.   • furosemide (LASIX) 20 MG tablet TAKE TWO TABLETS BY MOUTH DAILY   • gabapentin (NEURONTIN) 400 MG capsule Take 400 mg by mouth every 4 (four) hours.   • memantine (NAMENDA) 10 MG tablet TAKE ONE TABLET BY MOUTH DAILY   • Morphine (MSIR) 30 MG tablet 60 mg 3 (Three) Times a Day. Pt takes 2 po three times a day   • polyethylene glycol (MIRALAX) 17 g packet Take 17 g by mouth Daily As Needed (contipation).   • potassium chloride 10 MEQ CR tablet TAKE TWO TABLETS BY MOUTH DAILY     No current facility-administered medications on file prior to visit.          The following portions of the patient's history were reviewed and updated as appropriate: allergies, current medications, past family history, past medical history, past social history, past surgical history and problem list.    Review of Systems   Constitutional: Negative for activity change, appetite change and fever.   Respiratory: Negative for cough, shortness of breath and wheezing.    Cardiovascular: Positive for leg swelling (improved).   Skin: Positive for skin lesions (wound improved).       Objective   Vitals:    05/20/21 1606   BP: 98/64   BP Location: Right arm   Patient Position: Sitting   Cuff Size: Adult   Pulse: 68   Resp: 16   Temp: 97.1 °F (36.2 °C)   TempSrc: Temporal   SpO2: 97%   Weight: 74.8 kg (165 lb)   Height: 165.1 cm (65\")       BP Readings from Last 3 Encounters:   05/20/21 98/64   05/17/21 (!) 182/86   05/03/21 132/62        Wt Readings from Last 3 Encounters:   05/20/21 74.8 kg (165 lb)   05/15/21 79.8 kg (176 lb)   05/03/21 78 kg (172 lb)        Body mass index is 27.46 kg/m².  Nursing notes and vitals reviewed.    Physical Exam  Vitals and nursing note reviewed.   Constitutional:       Appearance: Normal appearance.   HENT:      Head: Normocephalic and atraumatic.      Mouth/Throat:      Mouth: Mucous membranes are moist. "   Eyes:      Extraocular Movements: Extraocular movements intact.      Conjunctiva/sclera: Conjunctivae normal.   Cardiovascular:      Rate and Rhythm: Normal rate and regular rhythm.   Pulmonary:      Effort: Pulmonary effort is normal. No respiratory distress.      Breath sounds: Normal breath sounds. No wheezing, rhonchi or rales.   Musculoskeletal:      Cervical back: Neck supple. No rigidity.   Skin:     General: Skin is warm and dry.   Neurological:      Mental Status: She is alert and oriented to person, place, and time. Mental status is at baseline.   Psychiatric:         Mood and Affect: Mood normal.         Behavior: Behavior normal.             Assessment/Plan   Diagnoses and all orders for this visit:    1. Pneumonia of both lungs due to infectious organism, unspecified part of lung (Primary)    2. Dependent edema    3. Open leg wound, right, subsequent encounter    Hospital records reviewed.  Pt back to baseline.  Finish course of cefdinir.  Consider repeat CXR at next visit.    Pt has bilateral unna boots and is going to the wound care clinic twice weekly.      Medications, including side effects, were discussed with the patient. Patient verbalized understanding.  The plan of care was discussed. All questions were answered. Patient verbalized understanding.      Return in about 3 months (around 8/20/2021).

## 2021-05-27 NOTE — OUTREACH NOTE
COPD/PN Week 2 Survey      Responses   [facility] patient discharged from?  [place]   Does the patient have one of the following disease processes/diagnoses(primary or secondary)?  COPD/Pneumonia   Was the primary reason for admission:  Pneumonia   Week 2 attempt successful?  Yes   Call start time  1605   Call end time  1606   Discharge diagnosis  PNA, AMS   Is patient permission given to speak with other caregiver?  Yes   List who call center can speak with  Peter-    Person spoke with today (if not patient) and relationship  Peter-    Meds reviewed with patient/caregiver?  Yes   Is the patient having any side effects they believe may be caused by any medication additions or changes?  No   Does the patient have all medications ordered at discharge?  Yes   Is the patient taking all medications as directed (includes completed medication regime)?  Yes   Does the patient have a primary care provider?   Yes   Does the patient have an appointment with their PCP or specialist within 7 days of discharge?  Yes   Has the patient kept scheduled appointments due by today?  Yes   Pulse Ox monitoring  None   Psychosocial issues?  No   Nursing interventions  Reviewed instructions with patient, Educated on MyChart   What is the patient's perception of their health status since discharge?  Improving   Nursing Interventions  Nurse provided patient education   Are the patient's immunizations up to date?   Yes   Nursing interventions  Educated on importance of maintaining up to date immunizations as advised by provider   If the patient is a current smoker, are they able to teach back resources for cessation?  Not a smoker   Is the patient/caregiver able to teach back the hierarchy of who to call/visit for symptoms/problems? PCP, Specialist, Home health nurse, Urgent Care, ED, 911  Yes   Is the patient/caregiver able to teach back signs and symptoms of worsening condition:  Fever/chills, Shortness of breath, Chest  pain   Is the patient/caregiver able to teach back importance of completing antibiotic course of treatment?  Yes   Week 2 call completed?  Yes          Marissa Parker RN

## 2021-06-04 NOTE — TELEPHONE ENCOUNTER
Caller: SHANNON     Relationship to patient: [facility]     Best call back number: [phone]    Patient is needing: SHANNON WHO IS A PHYSICAL THERAPIST FOR [facility] IS CALLING TO LET MD DE LA TORRE KNOW THAT PATIENT FELL ON 06/02/2021 IN HER BATHROOM. THERE ARE NO APPARENT INJURIES BUT PATIENT IS STATING SHE HAS SORENESS ON RIGHT SIDE OF RIB.

## 2021-06-08 NOTE — TELEPHONE ENCOUNTER
Pt is not in any pain and there does not seem to be any apparent injury. Rosie is just required to let us know and report it. As of now patient is doing well.

## 2021-06-18 NOTE — OUTREACH NOTE
COPD/PN Week 4 Survey      Responses   [facility] patient discharged from?  [place]   Does the patient have one of the following disease processes/diagnoses(primary or secondary)?  COPD/Pneumonia   Was the primary reason for admission:  Pneumonia   Week 4 attempt successful?  No          Courtney Jeter RN

## 2021-06-24 NOTE — OUTREACH NOTE
Ambulatory Case Management Note    Care Evaluation    Questions/Answers      Most Recent Value   Other Patient Education/Resources   24/7 [facility] Nurse Call Line, Jovi, Advanced Care Planning   24/7 Nurse Call Line Education Method  Verbal   ACP Education Method  Verbal [Address]   MyChart Education Method  Verbal [Addressed]   Medication Adherence  Medications understood      Patient Outreach  Talked with patient. Discussed 6/21/21 ED visit regarding .peripheral edema. Patient states to be compliant with ED recommendations; states symptoms have improved; wearing compression stockings; no draining noted and voiced intent of PCP follow up for recommendations. Patient attends wound care center 2 times per week for wound care to right leg that has edema and weeping with 6/25/21 appointment.  Patient lives with spouse; independent with ADL's; meal preparation; receiving assistance with transportation; ambulates with walker, WBAT . Patient compliant with medications and medical appointments.  Reviewed with patient ED AVS recommendations; education provided;  COVID 19 precautions; 24/7 Nurse Line Telephone number; ACM contact information; Advance Directives; My Chart; gaps in care; AWV and Case Management services.  Patient verbalized understanding and states to appreciate phone call.  No further questions or concerns voiced at this time.     Yesi Hutchinson RN  Ambulatory Case Management    6/24/2021, 14:53 EDT

## 2021-07-03 NOTE — PROGRESS NOTES
Returns to clinic for follow-up of her left shoulder and her left knee. She is also experiencing pain at the lateral aspect of her left hip. She was last seen in clinic on 4/29/2021 received corticosteroid injection of the time of the left shoulder subacromial bursa as well as the left knee. He reports fairly good symptom relief however pain at the knee has returned as well as the shoulder. Maximal tenderness at the shoulder present at the acromioclavicular joint no left knee at the medial and lateral joint line. Pain does radiate laterally to the groin although mild with increased pain noted at the lateral aspect of the hip. She is continued weightbearing as tolerated experiencing pain with increased pressure at the left upper extremity such as when she is attempting to push herself up with the left upper extremity. Denies any presence of numbness or tingling of the left lower extremity and left upper extremity. Denies other concerns present this time.

## 2021-08-19 NOTE — PROGRESS NOTES
Subjective     Sandra Mane is a 76 y.o. female, who presents with a chief complaint of   Chief Complaint   Patient presents with   • Follow-up   • Hyperlipidemia     Hyperlipidemia    Anemia    Chronic Kidney Disease  Associated symptoms include arthralgias.   Anxiety  Patient reports no nervous/anxious behavior.     Her past medical history is significant for anemia and depression.   DepressionPatient is not experiencing: nervousness/anxiety.    Memory Loss  Associated symptoms include arthralgias.   Leg Swelling  Associated symptoms include arthralgias.   Knee Pain       1. Dementia.  Pt takes memantine and tolerates this.    2. Depression with anxiety.  Pt states she is doing fine despite increased life/family stress.  Denies SI.    3. Chronic back pain.  She sees Dr. Anaya, who does epidural injections.  She also takes morphine.    4. OA.  She sees Adeline Vital and has occasional joint injections.    5. Leg swelling.  This is chronic.  She takes furosemide and uses compression stockings.    The following portions of the patient's history were reviewed and updated as appropriate: allergies, current medications, past family history, past medical history, past social history, past surgical history and problem list.    Allergies: Patient has no known allergies.    Review of Systems   Constitutional: Negative.    HENT: Negative.    Eyes: Negative.    Cardiovascular: Positive for leg swelling.   Gastrointestinal: Negative.    Endocrine: Negative.    Genitourinary: Positive for frequency.   Musculoskeletal: Positive for arthralgias and back pain.   Allergic/Immunologic: Negative.    Neurological: Negative.    Hematological: Negative.    Psychiatric/Behavioral: The patient is not nervous/anxious.      Objective     Wt Readings from Last 3 Encounters:   08/19/21 74.5 kg (164 lb 4.8 oz)   06/29/21 79.4 kg (175 lb)   06/21/21 79.4 kg (175 lb)     Temp Readings from Last 3 Encounters:   08/19/21 96.8 °F (36 °C) (Temporal)    06/21/21 98.3 °F (36.8 °C) (Oral)   05/20/21 97.1 °F (36.2 °C) (Temporal)     BP Readings from Last 3 Encounters:   08/19/21 136/72   06/21/21 172/67   05/20/21 98/64     Pulse Readings from Last 3 Encounters:   08/19/21 66   06/21/21 57   05/20/21 68     Body mass index is 27.34 kg/m².  SpO2 Readings from Last 3 Encounters:   02/26/18 96%   12/04/17 97%   10/10/17 95%       Physical Exam   Constitutional: She is oriented to person, place, and time. She appears well-developed.   Sitting in wheelchair.   HENT:   Head: Normocephalic and atraumatic.   Mouth/Throat: Mucous membranes are moist.   Eyes: Conjunctivae are normal.   Cardiovascular: Normal rate, regular rhythm and normal heart sounds. Exam reveals no gallop and no friction rub.   No murmur heard.  Pulmonary/Chest: Effort normal and breath sounds normal. No respiratory distress. She has no wheezes. She has no rales.   Abdominal: Soft. Bowel sounds are normal.   Musculoskeletal: No deformity.      Left lower leg: Left lower leg edema: compression stockings in place.      Comments: Back not examined.   Neurological: She is alert and oriented to person, place, and time.   Skin: Skin is warm and dry. No rash noted.   Psychiatric: Her behavior is normal. Mood normal.   Nursing note and vitals reviewed.      Assessment/Plan   Diagnoses and all orders for this visit:    1. Hyperlipidemia, unspecified hyperlipidemia type (Primary)  -     Comprehensive Metabolic Panel  -     Lipid Panel With / Chol / HDL Ratio  -     TSH    2. Stage 3 chronic kidney disease, unspecified whether stage 3a or 3b CKD (CMS/HCC)  -     CBC & Differential    3. Depression with anxiety  -     FLUoxetine (PROzac) 20 MG tablet; Take 2 tablets by mouth Daily.  Dispense: 60 tablet; Refill: 6    4. Chronic midline low back pain, unspecified whether sciatica present    5. Anemia, unspecified type    6. Late onset Alzheimer's disease without behavioral disturbance (CMS/HCC)    7. Gastroesophageal  reflux disease without esophagitis    8. Dependent edema    9. Arthritis    10. Routine health maintenance  -     Hemoglobin A1c  -     Vitamin B12      1. Hyperlipidemia.  Mild.  Controlled with lifestyle measures. Labs today.    2. CKDIII.  Stable.  Avoid NSAIDs.    3. Depression with anxiety.  Controlled.  Continue fluoxetine 40 mg daily.  Anticipatory guidance given.    4. Chronic back pain.  Stable.  Per pain management.  Dr. Anaya.    5. Anemia.  Chronic.  Stable.  Ferritin, B12, folate normal in the past.  Recheck labs today.    6. Alzheimer's.  She has had neuropsych testing and neurology consult.  She saw Dr. Cole.  Continue memantine.    8. GERD. Controlled with omeprazole prn.    9. Dependent edema.  Continue furosemide 40 mg daily and potassium supplement 20 mEq daily.  Compression stockings declined.      10. Left knee OA.  Injections per ortho.    11. Routine health maint.  Cologuard negative 1/2021.  Mammogram UTD.  Pneumovax and Covid-19 vaccines UTD.        Outpatient Medications Prior to Visit   Medication Sig Dispense Refill   • fluticasone (FLONASE) 50 MCG/ACT nasal spray 2 sprays into the nostril(s) as directed by provider Daily.     • furosemide (LASIX) 20 MG tablet TAKE TWO TABLETS BY MOUTH DAILY 60 tablet 2   • gabapentin (NEURONTIN) 400 MG capsule Take 400 mg by mouth every 4 (four) hours.     • memantine (NAMENDA) 10 MG tablet TAKE ONE TABLET BY MOUTH DAILY 90 tablet 1   • Morphine (MSIR) 30 MG tablet 60 mg 3 (Three) Times a Day. Pt takes 2 po three times a day     • polyethylene glycol (MIRALAX) 17 g packet Take 17 g by mouth Daily As Needed (contipation). 14 each 0   • potassium chloride 10 MEQ CR tablet TAKE TWO TABLETS BY MOUTH DAILY 60 tablet 2   • Toviaz 4 MG tablet sustained-release 24 hour tablet Take 4 mg by mouth Daily.     • FLUoxetine (PROzac) 20 MG tablet TAKE TWO TABLETS BY MOUTH DAILY 60 tablet 4     No facility-administered medications prior to visit.     New Medications  Ordered This Visit   Medications   • FLUoxetine (PROzac) 20 MG tablet     Sig: Take 2 tablets by mouth Daily.     Dispense:  60 tablet     Refill:  6     [unfilled]  Medications Discontinued During This Encounter   Medication Reason   • FLUoxetine (PROzac) 20 MG tablet Reorder       Return in about 6 months (around 2/19/2022).

## 2021-08-25 NOTE — PROGRESS NOTES
Large Joint Arthrocentesis: L knee  Date/Time: 8/25/2021 10:18 AM  Consent given by: patient  Site marked: site marked  Timeout: Immediately prior to procedure a time out was called to verify the correct patient, procedure, equipment, support staff and site/side marked as required   Supporting Documentation  Indications: pain   Procedure Details  Location: knee - L knee  Preparation: Patient was prepped and draped in the usual sterile fashion  Needle size: 20 G  Approach: superior  Medications administered: 60 mg Sodium Hyaluronate 60 MG/3ML  Patient tolerance: patient tolerated the procedure well with no immediate complications    Large Joint Arthrocentesis: L subacromial bursa  Date/Time: 8/25/2021 10:54 AM  Consent given by: patient  Site marked: site marked  Timeout: Immediately prior to procedure a time out was called to verify the correct patient, procedure, equipment, support staff and site/side marked as required   Supporting Documentation  Indications: pain   Procedure Details  Location: shoulder - L subacromial bursa  Preparation: Patient was prepped and draped in the usual sterile fashion  Needle size: 22 G  Approach: lateral  Medications administered: 80 mg triamcinolone acetonide 40 MG/ML; 4 mL lidocaine PF 1% 1 %  Patient tolerance: patient tolerated the procedure well with no immediate complications        Presents with spouse for viscosupplementation injection, single series of the left knee would also like to proceed with corticosteroid injection subacromial bursa of the left shoulder.  Received AC joint injection last visit with approximately 2 weeks symptom relief of the shoulder.  Has noted return of pain at the lateral aspect of the shoulder increased pain noted with lifting and reaching above head attempting to reach back behind her back.  Denies known injury just pain present with lifting.  Last received corticosteroid injection subacromial April 2021 with significant symptom relief for least  2 to 3 months.  Received approximately 2 weeks symptom relief with previous injection.  Denies other concerns present this time.

## 2021-09-02 NOTE — TELEPHONE ENCOUNTER
NATHANAEL WITH [email or web address] WAS RETURNING JAMES'S CALL ABOUT THE VERBAL ORDER REQUEST. SHE CAN BE REACHED A t [phone]

## 2021-09-02 NOTE — TELEPHONE ENCOUNTER
Caller: NATHANAEL    Relationship: Home Health [facility]    Best call back number: [phone]    What orders are you requesting (i.e. lab or imaging): VERBAL ORDERS FOR NURSING/SPEECH (DEMENTIA) AND WOUND CARE FOR LEFT AND RIGHT LEG (CELLULITIS)    In what timeframe would the patient need to come in: ASAP    Where will you receive your lab/imaging services: AT HOME

## 2021-09-03 NOTE — TELEPHONE ENCOUNTER
NATHANAEL SERRANO called and was wanting to know if verbal orders or orders can be put in for speech therapy and nurse wound care for this patient

## 2021-09-08 NOTE — TELEPHONE ENCOUNTER
Caller: [facility]     Relationship: [facility]    [place] call back number: [phone]    What orders are you requesting (i.e. lab or imaging):  ORDERS FOR TWO TIMES A WEEK FOR THREE WEEKS AND ONCE A WEEK FOR THREE WEEKS     In what timeframe would the patient need to come in: ASAP

## 2021-09-08 NOTE — TELEPHONE ENCOUNTER
taz called from home health to get verbal order for patient to PT, OT and skilled nurse wound care. please advise

## 2021-09-09 NOTE — TELEPHONE ENCOUNTER
PATIENT STATES: [facility] WOULD LIKE TO HAVE OT TWICE A WEEK FOR FOUR WEEKS AND ONCE A WEEK FOR TWO WEEKS      PATIENT CAN BE REACHED ON: [facility] [phone]

## 2021-09-09 NOTE — PROGRESS NOTES
Subjective   Sandra Mane is a 76 y.o. female presenting today for follow up of   Chief Complaint   Patient presents with   • Hospital Follow Up Visit     fell and has staples on head   • Head Laceration   • Urinary Tract Infection       History of Present Illness     Pt presents for hospital follow-up.  She was hospitalized at [facility] 8/29-9/1/2021 with UTI sepsis.  She was treated with IV antibiotics and IV fluids for NEIL.  She was discharged on Augmentin.  She denies fevers, dysuria, hematuria, mental status changes.  Furthermore, she had fallen at home after getting out of bed at home and sustained a head laceration, which was repaired with 7 staples. She would like to have me remove these staples today.  She reports that the laceration is not painful and has not been draining.    Patient Active Problem List   Diagnosis   • Depression with anxiety   • Back pain   • Arthritis   • CKD (chronic kidney disease) stage 3, GFR 30-59 ml/min (CMS/HCC)   • Hyperlipidemia   • GERD (gastroesophageal reflux disease)   • Diplopia   • Memory changes   • Snoring   • Anemia   • Head injury   • Late onset Alzheimer's disease without behavioral disturbance (CMS/HCC)   • Ecchymosis of right eye   • Dependent edema   • OAB (overactive bladder)   • Chronic pain of left knee   • Primary osteoarthritis of left knee   • Greater trochanteric bursitis of both hips   • Routine health maintenance   • Non-healing ulcer of lower leg, left, limited to breakdown of skin (CMS/HCC)   • Bilateral hip pain   • Complete tear of left rotator cuff   • Primary osteoarthritis, left shoulder   • Urinary tract infection in female   • Arthritis of right knee   • History of bleeding ulcers   • Depression   • S/P total knee arthroplasty   • Pneumonia of both lungs due to infectious organism   • Altered mental status       Current Outpatient Medications on File Prior to Visit   Medication Sig   • FLUoxetine (PROzac) 20 MG tablet Take 2  "tablets by mouth Daily.   • fluticasone (FLONASE) 50 MCG/ACT nasal spray 2 sprays into the nostril(s) as directed by provider Daily.   • furosemide (LASIX) 20 MG tablet TAKE TWO TABLETS BY MOUTH DAILY   • gabapentin (NEURONTIN) 400 MG capsule Take 400 mg by mouth every 4 (four) hours.   • memantine (NAMENDA) 10 MG tablet TAKE ONE TABLET BY MOUTH DAILY   • Morphine (MSIR) 30 MG tablet 60 mg 3 (Three) Times a Day. Pt takes 2 po three times a day   • Myrbetriq 50 MG tablet sustained-release 24 hour 24 hr tablet    • polyethylene glycol (MIRALAX) 17 g packet Take 17 g by mouth Daily As Needed (contipation).   • potassium chloride 10 MEQ CR tablet TAKE TWO TABLETS BY MOUTH DAILY     No current facility-administered medications on file prior to visit.          The following portions of the patient's history were reviewed and updated as appropriate: allergies, current medications, past family history, past medical history, past social history, past surgical history and problem list.    Review of Systems   Constitutional: Negative for fever.   Genitourinary: Negative for dysuria.   Neurological: Negative for confusion.       Objective   Vitals:    09/09/21 1153   BP: 130/80   Pulse: 71   Resp: 19   Temp: 96.6 °F (35.9 °C)   TempSrc: Temporal   SpO2: 97%   Weight: 71.1 kg (156 lb 12.8 oz)   Height: 165.1 cm (65\")       BP Readings from Last 3 Encounters:   09/09/21 130/80   08/19/21 136/72   06/21/21 172/67        Wt Readings from Last 3 Encounters:   09/09/21 71.1 kg (156 lb 12.8 oz)   08/25/21 74.4 kg (164 lb)   08/19/21 74.5 kg (164 lb 4.8 oz)        Body mass index is 26.09 kg/m².  Nursing notes and vitals reviewed.    Physical Exam  Vitals and nursing note reviewed.   Constitutional:       General: She is not in acute distress.     Appearance: She is ill-appearing (chronically).      Comments: In wheelchair.   HENT:      Head: Normocephalic.      Comments: 5 cm clean, dry laceration superior scalp with 7 staples.     " Mouth/Throat:      Mouth: Mucous membranes are moist.   Eyes:      Extraocular Movements: Extraocular movements intact.      Conjunctiva/sclera: Conjunctivae normal.   Cardiovascular:      Rate and Rhythm: Normal rate and regular rhythm.   Pulmonary:      Effort: No respiratory distress.      Breath sounds: Normal breath sounds.   Musculoskeletal:      Cervical back: Neck supple. No rigidity.   Skin:     General: Skin is warm and dry.   Neurological:      General: No focal deficit present.      Mental Status: She is alert and oriented to person, place, and time.   Psychiatric:         Mood and Affect: Mood normal.         Behavior: Behavior normal.         Recent Results (from the past 672 hour(s))   CBC & Differential    Collection Time: 08/19/21  2:36 PM    Specimen: Blood   Result Value Ref Range    WBC 5.7 3.4 - 10.8 x10E3/uL    RBC 3.52 (L) 3.77 - 5.28 x10E6/uL    Hemoglobin 10.4 (L) 11.1 - 15.9 g/dL    Hematocrit 33.2 (L) 34.0 - 46.6 %    MCV 94 79 - 97 fL    MCH 29.5 26.6 - 33.0 pg    MCHC 31.3 (L) 31.5 - 35.7 g/dL    RDW 14.1 11.7 - 15.4 %    Platelets 228 150 - 450 x10E3/uL    Neutrophil Rel % 65 Not Estab. %    Lymphocyte Rel % 22 Not Estab. %    Monocyte Rel % 9 Not Estab. %    Eosinophil Rel % 3 Not Estab. %    Basophil Rel % 1 Not Estab. %    Neutrophils Absolute 3.7 1.4 - 7.0 x10E3/uL    Lymphocytes Absolute 1.2 0.7 - 3.1 x10E3/uL    Monocytes Absolute 0.5 0.1 - 0.9 x10E3/uL    Eosinophils Absolute 0.2 0.0 - 0.4 x10E3/uL    Basophils Absolute 0.0 0.0 - 0.2 x10E3/uL    Immature Granulocyte Rel % 0 Not Estab. %    Immature Grans Absolute 0.0 0.0 - 0.1 x10E3/uL   Comprehensive Metabolic Panel    Collection Time: 08/19/21  2:36 PM    Specimen: Blood   Result Value Ref Range    Glucose CANCELED mg/dL    BUN 22 8 - 27 mg/dL    Creatinine 1.35 (H) 0.57 - 1.00 mg/dL    eGFR Non African Am 39 (L) >59 mL/min/1.73    eGFR African Am 45 (L) >59 mL/min/1.73    BUN/Creatinine Ratio 16 12 - 28    Sodium 141 134 - 144  mmol/L    Potassium CANCELED mmol/L    Chloride 98 96 - 106 mmol/L    Total CO2 24 20 - 29 mmol/L    Calcium 9.3 8.7 - 10.3 mg/dL    Total Protein 7.6 6.0 - 8.5 g/dL    Albumin 4.3 3.7 - 4.7 g/dL    Globulin 3.3 1.5 - 4.5 g/dL    A/G Ratio 1.3 1.2 - 2.2    Total Bilirubin 0.3 0.0 - 1.2 mg/dL    Alkaline Phosphatase 90 48 - 121 IU/L    AST (SGOT) 19 0 - 40 IU/L    ALT (SGPT) 10 0 - 32 IU/L   Hemoglobin A1c    Collection Time: 08/19/21  2:36 PM    Specimen: Blood   Result Value Ref Range    Hemoglobin A1C 5.4 4.8 - 5.6 %   Lipid Panel With / Chol / HDL Ratio    Collection Time: 08/19/21  2:36 PM    Specimen: Blood   Result Value Ref Range    Total Cholesterol 250 (H) 100 - 199 mg/dL    Triglycerides 129 0 - 149 mg/dL    HDL Cholesterol 67 >39 mg/dL    VLDL Cholesterol Taco 23 5 - 40 mg/dL    LDL Chol Calc (NIH) 160 (H) 0 - 99 mg/dL    Chol/HDL Ratio 3.7 0.0 - 4.4 ratio   TSH    Collection Time: 08/19/21  2:36 PM    Specimen: Blood   Result Value Ref Range    TSH 1.990 0.450 - 4.500 uIU/mL   Vitamin B12    Collection Time: 08/19/21  2:36 PM    Specimen: Blood   Result Value Ref Range    Vitamin B-12 322 232 - 1,245 pg/mL         Assessment/Plan   Diagnoses and all orders for this visit:    1. Urinary tract infection without hematuria, site unspecified (Primary)    2. Sepsis with acute renal failure, due to unspecified organism, unspecified acute renal failure type, unspecified whether septic shock present (CMS/[facility])    3. Laceration of scalp, subsequent encounter      Hospital records reviewed.  She is finishing her course of Augmentin.      The scalp staples were removed without incident with a staple remover.  Wound care.      Medications, including side effects, were discussed with the patient. Patient verbalized understanding.  The plan of care was discussed. All questions were answered. Patient verbalized understanding.      Return for Next scheduled follow up.

## 2021-09-17 ENCOUNTER — APPOINTMENT (OUTPATIENT)
Dept: WOUND CARE | Facility: HOSPITAL | Age: 76
End: 2021-09-17

## 2021-09-17 ENCOUNTER — TELEPHONE (OUTPATIENT)
Dept: INTERNAL MEDICINE | Facility: CLINIC | Age: 76
End: 2021-09-17

## 2021-09-17 NOTE — TELEPHONE ENCOUNTER
Caller: MAN    Relationship: Home Health    Best call back number:[phone]    What orders are you requesting (i.e. lab or imaging):     In what timeframe would the patient need to come in: ASAP    Where will you receive your lab/imaging services: PATIENT'S HOME     Additional notes:

## 2021-09-24 ENCOUNTER — APPOINTMENT (OUTPATIENT)
Dept: WOUND CARE | Facility: HOSPITAL | Age: 76
End: 2021-09-24

## 2024-02-01 NOTE — TELEPHONE ENCOUNTER
..   Non-compliant with chronic oxygen requirements  Home oxygen evaluation completed on 1/22, repeated on 1/31 - still requiring 2 L of supplemental oxygen at discharge  See full plan as noted above

## 2024-08-10 NOTE — PROGRESS NOTES
"    [facility] HOSPITALIST TEAM   PROGRESS NOTE      Patient Care Team:  Sriram Blanton MD as PCP - General (Family Medicine)  ReasorShad MD as Consulting Physician (Pain Medicine)    Patient seen at bedside    Hospitalist Team      Patient Care Team:  Provider, No Known as PCP - General          Subjective      Presenting History and Chief Complaints:      Chief Complaint:      Altered mental status    Admission History:    75 y/o F with PMH of dementia, anxiety was brought in to ED for confusion.   Per patient she does not know why she is here but feels \"sleepy\".  Per EMR,  said patient was not herself today and takes pain medication at home but her drowsiness did not respond to narcan.     Interval History and ROS:     Patient States Patient is fully alert now  Patient Complaints: No new complaints  Patient Denies:  Any sx of chest pain, shortness of breath, abdominal pain or n/v  History taken from: Patient      Objective    Vital Signs  Temp:  [97.7 °F (36.5 °C)-100.4 °F (38 °C)] 97.7 °F (36.5 °C)  Heart Rate:  [58-80] 58  Resp:  [16-18] 16  BP: ()/(48-72) 150/72    Flowsheet Rows      First Filed Value   Admission Height  165.1 cm (65\") Documented at 05/15/2021 1315   Admission Weight  79.8 kg (176 lb) Documented at 05/15/2021 1315              PHYSICAL EXAMINATION:    Physical Exam:     VS: As documented above  PE: GEN - A&O x 3, in NAD   HEENT - Normocephalic, PERRL, EOMI   CV - RRR, S1+, S2+. with no m/r/g  RESP - CTAB x no rales or rhonchi    ABD - s/nt/nd, NABS, no HSMeg, no palpable masses   MS - MAEW(moves all extremities well) , 5/5 strength UE/LE   EXT - No edema, cyanosis or clubbing  NEURO - CN II-XII intact, normal motor and sensory examinations without any focal  neurologic deficits.  PSYCH - affect, mood congruent and appropriate       Results Review:    I reviewed the patient's new clinical results.    Lab Results (last 24 hours)     Procedure Component " Value Units Date/Time    Comprehensive Metabolic Panel [[id number]]  (Abnormal) Collected: 05/16/21 0341    Specimen: Blood Updated: 05/16/21 0438     Glucose 99 mg/dL      BUN 18 mg/dL      Creatinine 1.31 mg/dL      Sodium 139 mmol/L      Potassium 4.0 mmol/L      Chloride 105 mmol/L      CO2 26.0 mmol/L      Calcium 8.1 mg/dL      Total Protein 6.1 g/dL      Albumin 3.00 g/dL      ALT (SGPT) 8 U/L      AST (SGOT) 12 U/L      Alkaline Phosphatase 88 U/L      Total Bilirubin 0.4 mg/dL      eGFR Non African Amer 40 mL/min/1.73      Globulin 3.1 gm/dL      A/G Ratio 1.0 g/dL      BUN/Creatinine Ratio 13.7     Anion Gap 8.0 mmol/L     Narrative:      GFR Normal >60  Chronic Kidney Disease <60  Kidney Failure <15      CBC Auto Differential [[id number]]  (Abnormal) Collected: 05/16/21 0341    Specimen: Blood Updated: 05/16/21 0416     WBC 11.97 10*3/mm3      RBC 3.01 10*6/mm3      Hemoglobin 9.0 g/dL      Hematocrit 29.4 %      MCV 97.7 fL      MCH 29.9 pg      MCHC 30.6 g/dL      RDW 14.9 %      RDW-SD 52.7 fl      MPV 9.5 fL      Platelets 204 10*3/mm3      Neutrophil % 81.1 %      Lymphocyte % 11.3 %      Monocyte % 5.7 %      Eosinophil % 1.1 %      Basophil % 0.2 %      Immature Grans % 0.6 %      Neutrophils, Absolute 9.72 10*3/mm3      Lymphocytes, Absolute 1.35 10*3/mm3      Monocytes, Absolute 0.68 10*3/mm3      Eosinophils, Absolute 0.13 10*3/mm3      Basophils, Absolute 0.02 10*3/mm3      Immature Grans, Absolute 0.07 10*3/mm3      nRBC 0.0 /100 WBC     Procalcitonin [[id number]]  (Abnormal) Collected: 05/15/21 2214    Specimen: Blood Updated: 05/15/21 2247     Procalcitonin 0.53 ng/mL     Narrative:      Results may be falsely decreased if patient taking Biotin.     Legionella Antigen, Urine - Urine, Urine, Catheter [[id number]]  (Normal) Collected: 05/15/21 2218    Specimen: Urine, Catheter Updated: 05/15/21 0614     LEGIONELLA ANTIGEN, URINE Negative    S. Pneumo Ag Urine or CSF - Urine, Urine, Catheter  [[id number]]  (Normal) Collected: 05/15/21 2218    Specimen: Urine, Catheter Updated: 05/15/21 2233     Strep Pneumo Ag Negative    MRSA Screen Culture (Outpatient) - Swab, Nares [[id number]] Collected: 05/15/21 2151    Specimen: Swab from Nares Updated: 05/15/21 2158    Blood Gas, Arterial - [[id number]]  (Abnormal) Collected: 05/15/21 2112    Specimen: Arterial Blood Updated: 05/15/21 2118     Site Left Radial     Marco's Test Positive     pH, Arterial 7.407 pH units      pCO2, Arterial 45.7 mm Hg      Comment: 83 Value above reference range        pO2, Arterial 68.3 mm Hg      Comment: 84 Value below reference range        HCO3, Arterial 28.8 mmol/L      Comment: 83 Value above reference range        Base Excess, Arterial 3.6 mmol/L      Comment: 83 Value above reference range        O2 Saturation, Arterial 95.9 %      Hemoglobin, Blood Gas 9.6 g/dL      Temperature 37.0 C      Barometric Pressure for Blood Gas 743 mmHg      Modality Nasal Cannula     Flow Rate 1.0 lpm      Ventilator Mode NA     Collected by [id number]     Comment: Meter: [id number]     :  [id number]        pCO2, Temperature Corrected 45.7 mm Hg      pH, Temp Corrected 7.407 pH Units      pO2, Temperature Corrected 68.3 mm Hg     T3, Free [[id number]]  (Abnormal) Collected: 05/15/21 1537    Specimen: Blood Updated: 05/15/21 1812     T3, Free 1.66 pg/mL     Narrative:      Results may be falsely increased if patient taking Biotin.      Blood Culture - Blood, Arm, Right [[id number]] Collected: 05/15/21 1713    Specimen: Blood from Arm, Right Updated: 05/15/21 1719    COVID PRE-OP / PRE-PROCEDURE SCREENING ORDER (NO ISOLATION) - Swab, Nasal Cavity [[id number]]  (Normal) Collected: 05/15/21 1625    Specimen: Swab from Nasal Cavity Updated: 05/15/21 1709    Narrative:      The following orders were created for panel order COVID PRE-OP / PRE-PROCEDURE SCREENING ORDER (NO ISOLATION) - Swab, Nasal Cavity.  Procedure                                Abnormality         Status                     ---------                               -----------         ------                     COVID-19,[facility] IN-BRETT...[[id number]]  Normal              Final result                 Please view results for these tests on the individual orders.    COVID-19,[facility] IN-HOUSE,Nasal Swab No Transport Media 3-4 HR TAT - Swab, Nasal Cavity [[id number]]  (Normal) Collected: 05/15/21 1625    Specimen: Swab from Nasal Cavity Updated: 05/15/21 1709     COVID19 Not Detected    Narrative:      Fact sheet for providers: https://www.fda.gov/media/191647/download     Fact sheet for patients: https://www.fda.gov/media/221089/download    Test performed by PCR.    Consider negative results in combination with clinical observations, patient history, and epidemiological information.    Blood Culture - Blood, Arm, Left [[id number]] Collected: 05/15/21 1653    Specimen: Blood from Arm, Left Updated: 05/15/21 1700    Urine Drug Screen - Urine, Clean Catch [[id number]]  (Abnormal) Collected: 05/15/21 1349    Specimen: Urine, Clean Catch Updated: 05/15/21 1639     THC, Screen, Urine Negative     Phencyclidine (PCP), Urine Negative     Cocaine Screen, Urine Negative     Methamphetamine, Ur Negative     Opiate Screen Positive     Amphetamine Screen, Urine Negative     Benzodiazepine Screen, Urine Negative     Tricyclic Antidepressants Screen Negative     Methadone Screen, Urine Negative     Barbiturates Screen, Urine Negative     Oxycodone Screen, Urine Negative     Propoxyphene Screen Negative     Buprenorphine, Screen, Urine Negative    Narrative:      Urine drug screen results are to be used for medical purposes only.  They are not to be used for legal purposes such as employment testing.  Negative results do not necessarily mean the complete absence of a subtance, but rather that the result is less than the cutoff for that substance.  Positive results are unconfirmed and considered Preliminary  Positive.  [facility] does not automatically confirm Postitive Unconfirmed results.  The physician may request (order) an Unconfirmed Positive result to be sent out for confirmation.      Negative Thresholds for Drugs Screened:    THC screen, urine                          50 ng/ml  Phenycyclidine (PCP), urine                25 ng/ml  Cocaine screen, urine                     150 ng/ml  Methamphetamine, urine                    500 ng/ml  Opiate screen, urine                      100 ng/ml  Amphetamine screen, urine                 500 ng/ml  Benzodiazepine screen, urine              150 ng/ml  Tricyclic Antidepressants screen, urine   300 ng/ml  Methadone screen, urine                   200 ng/ml  Barbiturates screen, urine                200 ng/ml  Oxycodone screen, urine                   100 ng/ml  Propoxyphene screen, urine                300 ng/ml  Buprenorphine screen, urine                10 ng/ml    TSH [[id number]]  (Normal) Collected: 05/15/21 1349    Specimen: Blood Updated: 05/15/21 1605     TSH 1.050 uIU/mL     T4, Free [[id number]]  (Normal) Collected: 05/15/21 1349    Specimen: Blood Updated: 05/15/21 1605     Free T4 0.99 ng/dL     Narrative:      Results may be falsely increased if patient taking Biotin.      Troponin [[id number]]  (Normal) Collected: 05/15/21 1349    Specimen: Blood Updated: 05/15/21 1506     Troponin T 0.014 ng/mL     Narrative:      Troponin T Reference Range:  <= 0.03 ng/mL-   Negative for AMI  >0.03 ng/mL-     Abnormal for myocardial necrosis.  Clinicians would have to utilize clinical acumen, EKG, Troponin and serial changes to determine if it is an Acute Myocardial Infarction or myocardial injury due to an underlying chronic condition.       Results may be falsely decreased if patient taking Biotin.      Ethanol [[id number]] Collected: 05/15/21 1349    Specimen: Blood Updated: 05/15/21 1505     Ethanol <10 mg/dL      Ethanol % <0.010 %     Acetaminophen Level  [[id number]]  (Normal) Collected: 05/15/21 1349    Specimen: Blood Updated: 05/15/21 1505     Acetaminophen <5.0 mcg/mL     Salicylate Level [[id number]]  (Normal) Collected: 05/15/21 1349    Specimen: Blood Updated: 05/15/21 1505     Salicylate <0.3 mg/dL     Lactic Acid, Plasma [[id number]]  (Normal) Collected: 05/15/21 1440    Specimen: Blood Updated: 05/15/21 1503     Lactate 0.6 mmol/L     Comprehensive Metabolic Panel [[id number]]  (Abnormal) Collected: 05/15/21 1349    Specimen: Blood Updated: 05/15/21 1425     Glucose 87 mg/dL      BUN 20 mg/dL      Creatinine 1.31 mg/dL      Sodium 141 mmol/L      Potassium 4.8 mmol/L      Chloride 103 mmol/L      CO2 28.4 mmol/L      Calcium 8.7 mg/dL      Total Protein 6.8 g/dL      Albumin 3.50 g/dL      ALT (SGPT) 9 U/L      AST (SGOT) 14 U/L      Alkaline Phosphatase 91 U/L      Total Bilirubin 0.4 mg/dL      eGFR Non African Amer 40 mL/min/1.73      Globulin 3.3 gm/dL      A/G Ratio 1.1 g/dL      BUN/Creatinine Ratio 15.3     Anion Gap 9.6 mmol/L     Narrative:      GFR Normal >60  Chronic Kidney Disease <60  Kidney Failure <15      Urinalysis With Culture If Indicated - Urine, Catheter In/Out [[id number]]  (Normal) Collected: 05/15/21 1349    Specimen: Urine, Catheter In/Out Updated: 05/15/21 1408     Color, UA Yellow     Appearance, UA Clear     pH, UA 6.0     Specific Gravity, UA 1.015     Glucose, UA Negative     Ketones, UA Negative     Bilirubin, UA Negative     Blood, UA Negative     Protein, UA Negative     Leuk Esterase, UA Negative     Nitrite, UA Negative     Urobilinogen, UA 0.2 E.U./dL    Narrative:      Urine microscopic not indicated.    CBC & Differential [[id number]]  (Abnormal) Collected: 05/15/21 1349    Specimen: Blood Updated: 05/15/21 1357    Narrative:      The following orders were created for panel order CBC & Differential.  Procedure                               Abnormality         Status                     ---------                                -----------         ------                     CBC Auto Differential[[id number]]        Abnormal            Final result                 Please view results for these tests on the individual orders.    CBC Auto Differential [[id number]]  (Abnormal) Collected: 05/15/21 1349    Specimen: Blood Updated: 05/15/21 1357     WBC 13.11 10*3/mm3      RBC 3.28 10*6/mm3      Hemoglobin 9.9 g/dL      Hematocrit 32.1 %      MCV 97.9 fL      MCH 30.2 pg      MCHC 30.8 g/dL      RDW 14.8 %      RDW-SD 52.7 fl      MPV 9.6 fL      Platelets 234 10*3/mm3      Neutrophil % 84.2 %      Lymphocyte % 9.0 %      Monocyte % 5.0 %      Eosinophil % 0.9 %      Basophil % 0.2 %      Immature Grans % 0.7 %      Neutrophils, Absolute 11.03 10*3/mm3      Lymphocytes, Absolute 1.18 10*3/mm3      Monocytes, Absolute 0.66 10*3/mm3      Eosinophils, Absolute 0.12 10*3/mm3      Basophils, Absolute 0.03 10*3/mm3      Immature Grans, Absolute 0.09 10*3/mm3      nRBC 0.0 /100 WBC           Imaging Results (Last 24 Hours)     Procedure Component Value Units Date/Time    CT Abdomen Pelvis With Contrast [[id number]] Collected: 05/15/21 1627     Updated: 05/15/21 1629    Narrative:      CT Abdomen Pelvis W    INDICATION:   Lower abdominal pain, altered mental status    TECHNIQUE:   CT of the abdomen and pelvis with IV contrast. Coronal and sagittal reconstructions were obtained.  Radiation dose reduction techniques included automated exposure control or exposure modulation based on body size. Count of known CT and cardiac nuc med  studies performed in previous 12 months: 0.     COMPARISON:   CT of the abdomen and pelvis from 3/7/2021     FINDINGS:  Exam is limited by motion artifact.  Abdomen: Chronic patchy changes are seen at the lung bases without definite focal consolidation/pneumonia. The liver, spleen, kidneys and pancreas appear unremarkable. There is a small fat-containing umbilical hernia.    Pelvis: The urinary bladder is grossly distended. There is  a prominent dextroscoliosis. No definite acute osseous abnormality. Mildly prominent lymph nodes are noted in the patient's groin bilaterally.      Impression:      1. Exam is limited by motion artifact. There is no definite acute intra-abdominal abnormality. The urinary bladder is grossly distended. If the patient is not able to void spontaneously, consider catheterization.   2. Mildly prominent nodes are noted in the patient's groin bilaterally. These are of uncertain etiology and clinical significance. These may be reactive.          Signer Name: Judie Lara MD   Signed: 5/15/2021 4:27 PM   Workstation Name: [id number]    Radiology [facility]    CT Head Without Contrast [[id number]] Collected: 05/15/21 1616     Updated: 05/15/21 1618    Narrative:      CT Head WO: 5/15/2021 5:01 PM    HISTORY:   Altered mental status    TECHNIQUE:   Axial unenhanced head CT. Radiation dose reduction techniques included automated exposure control or exposure modulation based on body size. Radiation audit for number of CT and nuclear cardiology exams performed in the last year: 0.      COMPARISON:   CT of the head from 3/6/2021    FINDINGS:   Exam is limited by patient motion. No definite intracranial hemorrhage, mass, or infarct. No hydrocephalus or extra-axial fluid collection. The skull base, calvarium, and extracranial soft tissues are normal.      Impression:      No definite acute intracranial abnormality. Exam is limited by extensive motion artifact.          Signer Name: Judie Lara MD   Signed: 5/15/2021 4:16 PM   Workstation Name: [id number]    Radiology [facility]    XR Chest 1 View [[id number]] Collected: 05/15/21 1614     Updated: 05/15/21 1616    Narrative:      CR Chest 1 Vw    INDICATION:   Altered mental status     COMPARISON:    Chest x-ray from 3/6/2021    FINDINGS:  Single portable AP view(s) of the chest.  The heart may be mildly enlarged.. Patchy bilateral airspace disease is again  noted. No pneumothorax or acute osseous abnormality.      Impression:      There is patchy bilateral airspace disease that does not appear definitely changed from prior. Findings may be seen with potential bilateral pneumonia or pulmonary edema.    Signer Name: Judie Lara MD   Signed: 5/15/2021 4:14 PM   Workstation Name: [id number]    [facility]          X-ray reviewed personally by physician.      ECG reviewed personally by physician  ECG/EMG Results (most recent)     Procedure Component Value Units Date/Time    ECG 12 Lead [[id number]] Collected: 05/15/21 1443     Updated: 05/15/21 2019     QT Interval 414 ms     Narrative:      HEART RATE= 74  bpm  RR Interval= 808  ms  IA Interval= 138  ms  P Horizontal Axis= 11  deg  P Front Axis= 61  deg  QRSD Interval= 88  ms  QT Interval= 414  ms  QRS Axis= 24  deg  T Wave Axis= 29  deg  - NORMAL ECG -  Sinus rhythm  POOR R WAVE PROGRESSION  NO SIGNIFICANT CHANGE FROM PREVIOUS ECG  Electronically Signed By: Tani Kelley ([facility]) 15-May-2021 20:19:12  Date and Time of Study: 2021-05-15 14:43:01          Medication Review:   I have reviewed the patient's current medication list    Current Facility-Administered Medications:   •  cefepime (MAXIPIME) IVPB 2 g/50ml D5W (premix), 2 g, Intravenous, Q12H, Felix Loya MD, 2 g at 05/15/21 3989  •  enoxaparin (LOVENOX) syringe 40 mg, 40 mg, Subcutaneous, Q24H, Felix Loya MD, 40 mg at 05/15/21 2015  •  famotidine (PEPCID) injection 20 mg, 20 mg, Intravenous, Q12H, Felix Loya MD, 20 mg at 05/15/21 2016  •  nitroglycerin (NITROSTAT) SL tablet 0.4 mg, 0.4 mg, Sublingual, Q5 Min PRN, Felix Loya MD  •  sodium chloride 0.9 % flush 10 mL, 10 mL, Intravenous, PRN, Felix Loya MD  •  sodium chloride 0.9 % flush 10 mL, 10 mL, Intravenous, Q12H, Felix Loya MD, 10 mL at 05/15/21 2017  •  sodium chloride 0.9 % infusion 40 mL, 40 mL, Intravenous, PRN, Vincenzo, Felix WILSON MD      Assessment/Plan            PLAN:    -Labs and diagnostic tests reviewed: YES    -Diagnostic tests reviewed: YES    -Consultations: N/A    -Any new recommendations: To continue current management    -New Labs ordered: CBC, CMP, Prolactin    -New diagnostic tests ordered: N/A    -Any changes in medications:  MEDICATION CHANGES:   New Medications added: As per order sheet   Medication Dose changed: N/A   Medications deleted: N/A    -Placement issues: N/A    -Patient is clinically and hemodynamically stable    -To continue current management and supportive care    -Will follow patient closely    -Nothing new to add for right now    -Discharge planning issues: Patient should be able to go back home once ready for discharge      DIAGNOSES:      PRIMARY DIAGNOSES:      Acute hypoxic respiratory failure: Due to mental status changes. Since she is on Morphine, her  gave her Narcan but it did not make any difference. He said there is no chance that she overdosed on her morphine because he administers her medications.  This fits in with the picture as she did not wake up more with the Narcan.     Bilateral Pneumonia: CXR from yesterday showed There is patchy bilateral airspace disease that does not appear definitely changed from prior. Findings may be seen with potential bilateral pneumonia or pulmonary edema. Possible aspiration. WBC 11.97 down from 13.11 with 81.1 N. Procalcitonin 0.53    Altered mental status: Most likely associated with morphine, gabapentin and clonazepam intake    Anxiety: Has been taking clonazepam. Will hold and watch    Depression: On Fluoxetine    Constipation: On Miralax    Dementia: On Memantine    Allergic Rhinitis: On Fluticasone nasal spary    Chronic backache secondary to scoliosis: Seemingly she is on oral Morphine for pain control      Anthropometric Analysis: BMI:  29.29 (Overweight)    CODE Status: FULL CODE    Tobacco use: N/A    Alcohol intake: N/A    Illegal Drug use:  Morphine as prescription drug      DVT Prophylaxis: Inj Enoxaparin and SCDs          ?     SECONDARY DIAGNOSES:  ?     As above      SURGICAL DIAGNOSES:      As per Problem List      TODAY'S DISCHARGE:        N/A          Plan for disposition: Patient should be able to go back to Home once ready for discharge    Felix Loya MD  05/16/21  07:00 EDT            Felix Loya M.D.; MS; FACP; MPH; MARY  Internal Medicine/ Hospitalist          Time:  30  minutes     Yes